# Patient Record
Sex: FEMALE | Race: WHITE | Employment: OTHER | URBAN - METROPOLITAN AREA
[De-identification: names, ages, dates, MRNs, and addresses within clinical notes are randomized per-mention and may not be internally consistent; named-entity substitution may affect disease eponyms.]

---

## 2016-05-10 LAB
HPV ABSTRACT: NORMAL
PAP-ABSTRACT: NORMAL

## 2017-08-02 ENCOUNTER — TRANSFERRED RECORDS (OUTPATIENT)
Dept: HEALTH INFORMATION MANAGEMENT | Facility: CLINIC | Age: 67
End: 2017-08-02

## 2017-08-02 LAB
CHOLEST SERPL-MCNC: 209 MG/DL (ref 0–199)
HDLC SERPL-MCNC: 56 MG/DL
HEP C HIM: NORMAL
LDLC SERPL CALC-MCNC: 141 MG/DL (ref 0–129)
NONHDLC SERPL-MCNC: 153 MG/DL
TRIGL SERPL-MCNC: 59 MG/DL (ref 0–149)

## 2017-12-29 ENCOUNTER — TRANSFERRED RECORDS (OUTPATIENT)
Dept: HEALTH INFORMATION MANAGEMENT | Facility: CLINIC | Age: 67
End: 2017-12-29

## 2018-03-08 ENCOUNTER — OFFICE VISIT (OUTPATIENT)
Dept: INTERNAL MEDICINE | Facility: CLINIC | Age: 68
End: 2018-03-08
Payer: COMMERCIAL

## 2018-03-08 ENCOUNTER — TELEPHONE (OUTPATIENT)
Dept: INTERNAL MEDICINE | Facility: CLINIC | Age: 68
End: 2018-03-08

## 2018-03-08 VITALS
WEIGHT: 157 LBS | BODY MASS INDEX: 23.25 KG/M2 | HEART RATE: 63 BPM | RESPIRATION RATE: 20 BRPM | TEMPERATURE: 98.6 F | OXYGEN SATURATION: 95 % | DIASTOLIC BLOOD PRESSURE: 77 MMHG | SYSTOLIC BLOOD PRESSURE: 137 MMHG | HEIGHT: 69 IN

## 2018-03-08 DIAGNOSIS — I10 HYPERTENSION GOAL BP (BLOOD PRESSURE) < 140/90: ICD-10-CM

## 2018-03-08 DIAGNOSIS — H91.90 HEARING LOSS, UNSPECIFIED HEARING LOSS TYPE, UNSPECIFIED LATERALITY: ICD-10-CM

## 2018-03-08 DIAGNOSIS — M85.80 OSTEOPENIA, UNSPECIFIED LOCATION: Primary | ICD-10-CM

## 2018-03-08 DIAGNOSIS — A60.00 RECURRENT GENITAL HERPES: ICD-10-CM

## 2018-03-08 LAB
ANION GAP SERPL CALCULATED.3IONS-SCNC: 9 MMOL/L (ref 3–14)
BUN SERPL-MCNC: 13 MG/DL (ref 7–30)
CALCIUM SERPL-MCNC: 9.5 MG/DL (ref 8.5–10.1)
CHLORIDE SERPL-SCNC: 98 MMOL/L (ref 94–109)
CO2 SERPL-SCNC: 30 MMOL/L (ref 20–32)
CREAT SERPL-MCNC: 0.6 MG/DL (ref 0.52–1.04)
GFR SERPL CREATININE-BSD FRML MDRD: >90 ML/MIN/1.7M2
GLUCOSE SERPL-MCNC: 78 MG/DL (ref 70–99)
POTASSIUM SERPL-SCNC: 3.3 MMOL/L (ref 3.4–5.3)
SODIUM SERPL-SCNC: 137 MMOL/L (ref 133–144)

## 2018-03-08 PROCEDURE — 82306 VITAMIN D 25 HYDROXY: CPT | Performed by: INTERNAL MEDICINE

## 2018-03-08 PROCEDURE — 36415 COLL VENOUS BLD VENIPUNCTURE: CPT | Performed by: INTERNAL MEDICINE

## 2018-03-08 PROCEDURE — 99214 OFFICE O/P EST MOD 30 MIN: CPT | Performed by: INTERNAL MEDICINE

## 2018-03-08 PROCEDURE — 80048 BASIC METABOLIC PNL TOTAL CA: CPT | Performed by: INTERNAL MEDICINE

## 2018-03-08 RX ORDER — TRIAMTERENE/HYDROCHLOROTHIAZID 37.5-25 MG
1 TABLET ORAL DAILY
Qty: 90 TABLET | Refills: 1 | Status: SHIPPED | OUTPATIENT
Start: 2018-03-08 | End: 2018-08-24

## 2018-03-08 RX ORDER — ACYCLOVIR 400 MG/1
400 TABLET ORAL 2 TIMES DAILY
Qty: 180 TABLET | Refills: 1 | Status: SHIPPED | OUTPATIENT
Start: 2018-03-08 | End: 2018-08-24

## 2018-03-08 RX ORDER — CHOLECALCIFEROL (VITAMIN D3) 25 MCG
1000 CAPSULE ORAL
COMMUNITY

## 2018-03-08 ASSESSMENT — PAIN SCALES - GENERAL: PAINLEVEL: NO PAIN (0)

## 2018-03-08 NOTE — MR AVS SNAPSHOT
After Visit Summary   3/8/2018    Florencia Ross    MRN: [id number]           Patient Information     Date Of Birth          1950        Visit Information        Provider Department      3/8/2018 10:50 AM Phuong Persaud MD [facility] [place]        Today's Diagnoses     Osteopenia, unspecified location    -  1    Hypertension goal BP (blood pressure) < 140/90        Recurrent genital herpes        Hearing loss, unspecified hearing loss type, unspecified laterality          Care Instructions    Please complete a hearing test at our clinic soon.     We will let you know your test results as discussed: by phone for urgent results, otherwise by postal mail.       Please return to clinic in 6 months for a physical, with fasting labs a few days before that (we may advise a visit sooner, based on today's lab/test results).     Please activate your [facility] [employer] account.     At your visit today, we discussed your risk for falls and preventive options.    Fall Prevention  Falls often occur due to slipping, tripping or losing your balance. Millions of people fall every year and injure themselves. Here are ways to reduce your risk of falling again.    Think about your fall, was there anything that caused your fall that can be fixed, removed, or replaced?    Make your home safe by keeping walkways clear of objects you may trip over.    Use non-slip pads under rugs. Do not use area rugs or small throw rugs.    Use non-slip mats in bathtubs and showers.    Install handrails and lights on staircases.    Do not walk in poorly lit areas.    Do not stand on chairs or wobbly ladders.    Use caution when reaching overhead or looking upward. This position can cause a loss of balance.    Be sure your shoes fit properly, have non-slip bottoms and are in good condition.     Wear shoes both inside and out. Avoid going barefoot or wearing slippers.    Be cautious when going up and down stairs, curbs,  and when walking on uneven sidewalks.    If your balance is poor, consider using a cane or walker.    If your fall was related to alcohol use, stop or limit alcohol intake.     If your fall was related to use of sleeping medicines, talk to your doctor about this. You may need to reduce your dosage at bedtime if you awaken during the night to go to the bathroom.      To reduce the need for nighttime bathroom trips:    Avoid drinking fluids for several hours before going to bed    Empty your bladder before going to bed    Men can keep a urinal at the bedside    Stay as active as you can. Balance, flexibility, strength, and endurance all come from exercise. They all play a role in preventing falls. Ask your healthcare provider which types of activity are right for you.    Get your vision checked on a regular basis.    If you have pets, know where they are before you stand up or walk so you don't trip over them.    Use night lights.  Date Last Reviewed: 11/5/2015 2000-2017 [employer]. [address] [place] [address]. All rights reserved. This information is not intended as a substitute for professional medical care. Always follow your healthcare professional's instructions.                Follow-ups after your visit        Additional Services     AUDIOLOGY ADULT REFERRAL       Your provider has referred you to: FM[facility]: [facility] [phone]   [email or web address]  [facility]: [facility] [phone]   [email or web address]    Treatment:  Evaluation & Treatment  Specialty Testing:  Audiogram w/Tymps and Reflexes    Please be aware that coverage of these services is subject to the terms and limitations of your health insurance plan.  Call member services at your health plan with any benefit or coverage questions.      Please bring the following to your appointment:  >>   Any x-rays, CTs or MRIs which have been  "performed.  Contact the facility where they were done to arrange for  prior to your scheduled appointment.   >>   List of current medications  >>   This referral request   >>   Any documents/labs given to you for this referral                  Future tests that were ordered for you today     Open Future Orders        Priority Expected Expires Ordered    **Basic metabolic panel FUTURE 6mo Routine 2018 10/4/2018 3/8/2018    Lipid panel reflex to direct LDL Fasting Routine 2018 10/4/2018 3/8/2018            Who to contact     If you have questions or need follow up information about today's clinic visit or your schedule please contact [facility] directly at [phone].  Normal or non-critical lab and imaging results will be communicated to you by MyChart, letter or phone within 4 business days after the clinic has received the results. If you do not hear from us within 7 days, please contact the clinic through [employer] or phone. If you have a critical or abnormal lab result, we will notify you by phone as soon as possible.  Submit refill requests through [employer] or call your pharmacy and they will forward the refill request to us. Please allow 3 business days for your refill to be completed.          Additional Information About Your Visit        MyChart Information     [employer] lets you send messages to your doctor, view your test results, renew your prescriptions, schedule appointments and more. To sign up, go to [email or web address] . Click on \"Log in\" on the left side of the screen, which will take you to the Welcome page. Then click on \"Sign up Now\" on the right side of the page.     You will be asked to enter the access code listed below, as well as some personal information. Please follow the directions to create your username and password.     Your access code is: [email or web address]  Expires: 2018 11:24 AM     Your access code will  in 90 days. If you need help or a new code, " "please call your [place] clinic or [phone].        Care EveryWhere ID     This is your Care EveryWhere ID. This could be used by other organizations to access your [place] medical records  [id number]        Your Vitals Were     Pulse Temperature Respirations Height Pulse Oximetry BMI (Body Mass Index)    63 98.6  F (37  C) (Oral) 20 5' 8.86\" (1.749 m) 95% 23.28 kg/m2       Blood Pressure from Last 3 Encounters:   03/08/18 137/77   07/23/15 122/71   05/14/12 138/72    Weight from Last 3 Encounters:   03/08/18 157 lb (71.2 kg)   05/14/12 151 lb (68.5 kg)   07/11/11 153 lb (69.4 kg)              We Performed the Following     AUDIOLOGY ADULT REFERRAL     Basic metabolic panel     Vitamin D Deficiency          Today's Medication Changes          These changes are accurate as of 3/8/18 11:27 AM.  If you have any questions, ask your nurse or doctor.               Stop taking these medicines if you haven't already. Please contact your care team if you have questions.     tretinoin 0.025 % cream   Commonly known as:  RETIN-A   Stopped by:  Phuong Persaud MD                Where to get your medicines      These medications were sent to [facility] [address] [place], [place] - [address] [facility]  [address] [facility] [address]     Phone:  [phone]     acyclovir 400 MG tablet    triamterene-hydrochlorothiazide 37.5-25 MG per tablet                Primary Care Provider Office Phone # Fax #    González Russo [phone]       [address]  [facility] [address]        Equal Access to Services     [facility]: Hadii shimon Carvajal, wavaishnavida lurocaeladaha, qaybta kaalmada pastora, kendal curtis. So [facility] [phone].    ATENCIÓN: Si habla español, tiene a benjamin disposición servicios gratuitos de asistencia lingüística. Llame al [phone].    We comply with applicable federal civil rights laws and [place] laws. We do not discriminate on " the basis of race, color, national origin, age, disability, sex, sexual orientation, or gender identity.            Thank you!     [facility] you for [facility]  [facility]. Our goal is always to provide you with excellent care. Hearing back from our patients is one way we can continue to improve our services. Please take a few minutes to complete the written survey that you may receive in the mail after your visit with us. Thank you!             Your Updated Medication List - Protect others around you: Learn how to safely use, store and throw away your medicines at www.disposemymeds.org.          This list is accurate as of 3/8/18 11:27 AM.  Always use your most recent med list.                   Brand Name Dispense Instructions for use Diagnosis    acyclovir 400 MG tablet    ZOVIRAX    180 tablet    Take 1 tablet (400 mg) by mouth 2 times daily    Recurrent genital herpes       aspirin 81 MG EC tablet     90 tablet    Take 1 tablet by mouth daily.    Hypertension goal BP (blood pressure) < 140/90       ONE-A-DAY WOMENS PO      Take  by mouth.        triamterene-hydrochlorothiazide 37.5-25 MG per tablet    MAXZIDE-25    90 tablet    Take 1 tablet by mouth daily    Hypertension goal BP (blood pressure) < 140/90       TUMS PO      Take  by mouth. 2  A day        Vitamin D-3 1000 UNITS Caps      1,000 Units

## 2018-03-08 NOTE — PROGRESS NOTES
SUBJECTIVE:   Florencia Ross is a 67 year old female who presents to clinic today for the following health issues:      New Patient/Transfer of Care      Hearing loss in the right ear (pt is unsure if it may also be the left ear), since she had pneumonia 30 years ago, but the hearing loss has been getting worse in the past 3-4 years. She does also report tinnitus-she has had the tinnitus for years, but not sure of the exact duration. No vertigo. No headaches. No vision changes.            Reviewed and updated as needed this visit by clinical staff  Tobacco  Allergies  Meds  Problems       Reviewed and updated as needed this visit by Provider  Meds  Problems           Patient Active Problem List   Diagnosis     Hypertension goal BP (blood pressure) < 140/90     CARDIOVASCULAR SCREENING; LDL GOAL LESS THAN 130     Recurrent genital herpes     Osteopenia     Wrinkling of orbital skin     Lentigo     KP (keratosis pilaris)     Angioma of skin     Photoaging of skin     Dysplastic nevus of right lower back s/p excision 7-23-15       Past Medical History:   Diagnosis Date     Hypertension        Past Surgical History:   Procedure Laterality Date     GYN SURGERY  1983    c section       Family History   Problem Relation Age of Onset     CEREBROVASCULAR DISEASE Mother      Hypertension Father        Social History   Substance Use Topics     Smoking status: Former Smoker     Quit date: 7/11/1977     Smokeless tobacco: Never Used     Alcohol use Yes       Current Outpatient Prescriptions   Medication     triamterene-hydrochlorothiazide (MAXZIDE-25) 37.5-25 MG per tablet     acyclovir (ZOVIRAX) 400 MG tablet     Multiple Vitamins-Calcium (ONE-A-DAY WOMENS PO)     Cholecalciferol (VITAMIN D-3) 1000 UNITS CAPS     aspirin 81 MG EC tablet     Calcium Carbonate Antacid (TUMS PO)     No current facility-administered medications for this visit.          ROS:  Constitutional, HEENT, cardiovascular, pulmonary, GI, ,  "musculoskeletal, neuro, skin, endocrine and psych systems are negative, except as otherwise noted.     OBJECTIVE:                                                    /77  Pulse 63  Temp 98.6  F (37  C) (Oral)  Resp 20  Ht 5' 8.86\" (1.749 m)  Wt 157 lb (71.2 kg)  SpO2 95%  BMI 23.28 kg/m2     GENERAL APPEARANCE: healthy, alert and in no distress  EYES: Eyes grossly normal to inspection, and conjunctivae and sclerae normal  HENT: head normocephalic and atraumatic and mouth without ulcers or lesions, oropharynx clear and oral mucous membranes moist  NECK: no noticeable adenopathy, no asymmetry, masses, or scars   RESP: lungs clear to auscultation - no rales, rhonchi or wheezes  CV: regular rate and rhythm, normal S1 S2, no S3 or S4, no murmur, click or rub, no peripheral edema and peripheral pulses strong  ABDOMEN: soft, nontender, no hepatosplenomegaly, no masses and bowel sounds normal  MS: no musculoskeletal defects are noted and gait is age appropriate without ataxia  SKIN: no suspicious lesions or rashes  NEURO: mentation intact and speech normal  PSYCH: mentation appears normal and affect normal/bright.    Results for orders placed or performed in visit on 03/08/18   Basic metabolic panel   Result Value Ref Range    Sodium 137 133 - 144 mmol/L    Potassium 3.3 (L) 3.4 - 5.3 mmol/L    Chloride 98 94 - 109 mmol/L    Carbon Dioxide 30 20 - 32 mmol/L    Anion Gap 9 3 - 14 mmol/L    Glucose 78 70 - 99 mg/dL    Urea Nitrogen 13 7 - 30 mg/dL    Creatinine 0.60 0.52 - 1.04 mg/dL    GFR Estimate >90 >60 mL/min/1.7m2    GFR Estimate If Black >90 >60 mL/min/1.7m2    Calcium 9.5 8.5 - 10.1 mg/dL   Vitamin D Deficiency   Result Value Ref Range    Vitamin D Deficiency screening 74 20 - 75 ug/L       No results found for this or any previous visit (from the past 744 hour(s)).      ASSESSMENT/PLAN:                                                        ICD-10-CM    1. Osteopenia, unspecified location M85.80 Vitamin D " Deficiency   2. Hypertension goal BP (blood pressure) < 140/90 I10 triamterene-hydrochlorothiazide (MAXZIDE-25) 37.5-25 MG per tablet     **Basic metabolic panel FUTURE 6mo     Lipid panel reflex to direct LDL Fasting     Basic metabolic panel   3. Recurrent genital herpes A60.00 acyclovir (ZOVIRAX) 400 MG tablet   4. Hearing loss, unspecified hearing loss type, unspecified laterality H91.90 AUDIOLOGY ADULT REFERRAL       Patient Instructions   Please complete a hearing test at our clinic soon.     We will let you know your test results as discussed: by phone for urgent results, otherwise by postal mail.       Please return to clinic in 6 months for a physical, with fasting labs a few days before that (we may advise a visit sooner, based on today's lab/test results).     Please activate your [facility] account.     At your visit today, we discussed your risk for falls and preventive options.    Fall Prevention  Falls often occur due to slipping, tripping or losing your balance. Millions of people fall every year and injure themselves. Here are ways to reduce your risk of falling again.    Think about your fall, was there anything that caused your fall that can be fixed, removed, or replaced?    Make your home safe by keeping walkways clear of objects you may trip over.    Use non-slip pads under rugs. Do not use area rugs or small throw rugs.    Use non-slip mats in bathtubs and showers.    Install handrails and lights on staircases.    Do not walk in poorly lit areas.    Do not stand on chairs or wobbly ladders.    Use caution when reaching overhead or looking upward. This position can cause a loss of balance.    Be sure your shoes fit properly, have non-slip bottoms and are in good condition.     Wear shoes both inside and out. Avoid going barefoot or wearing slippers.    Be cautious when going up and down stairs, curbs, and when walking on uneven sidewalks.    If your balance is poor, consider using a cane  or walker.    If your fall was related to alcohol use, stop or limit alcohol intake.     If your fall was related to use of sleeping medicines, talk to your doctor about this. You may need to reduce your dosage at bedtime if you awaken during the night to go to the bathroom.      To reduce the need for nighttime bathroom trips:    Avoid drinking fluids for several hours before going to bed    Empty your bladder before going to bed    Men can keep a urinal at the bedside    Stay as active as you can. Balance, flexibility, strength, and endurance all come from exercise. They all play a role in preventing falls. Ask your healthcare provider which types of activity are right for you.    Get your vision checked on a regular basis.    If you have pets, know where they are before you stand up or walk so you don't trip over them.    Use night lights.  Date Last Reviewed: 11/5/2015 2000-2017 [employer]. [address] [place] [address]. All rights reserved. This information is not intended as a substitute for professional medical care. Always follow your healthcare professional's instructions.            Phuong Persaud MD    [facility]  [address] [facility] [address] [phone]

## 2018-03-08 NOTE — PATIENT INSTRUCTIONS
Please complete a hearing test at our clinic soon.     We will let you know your test results as discussed: by phone for urgent results, otherwise by postal mail.       Please return to clinic in 6 months for a physical, with fasting labs a few days before that (we may advise a visit sooner, based on today's lab/test results).     Please activate your [employer] account.     At your visit today, we discussed your risk for falls and preventive options.    Fall Prevention  Falls often occur due to slipping, tripping or losing your balance. Millions of people fall every year and injure themselves. Here are ways to reduce your risk of falling again.    Think about your fall, was there anything that caused your fall that can be fixed, removed, or replaced?    Make your home safe by keeping walkways clear of objects you may trip over.    Use non-slip pads under rugs. Do not use area rugs or small throw rugs.    Use non-slip mats in bathtubs and showers.    Install handrails and lights on staircases.    Do not walk in poorly lit areas.    Do not stand on chairs or wobbly ladders.    Use caution when reaching overhead or looking upward. This position can cause a loss of balance.    Be sure your shoes fit properly, have non-slip bottoms and are in good condition.     Wear shoes both inside and out. Avoid going barefoot or wearing slippers.    Be cautious when going up and down stairs, curbs, and when walking on uneven sidewalks.    If your balance is poor, consider using a cane or walker.    If your fall was related to alcohol use, stop or limit alcohol intake.     If your fall was related to use of sleeping medicines, talk to your doctor about this. You may need to reduce your dosage at bedtime if you awaken during the night to go to the bathroom.      To reduce the need for nighttime bathroom trips:    Avoid drinking fluids for several hours before going to bed    Empty your bladder before going to bed    Men can  keep a urinal at the bedside    Stay as active as you can. Balance, flexibility, strength, and endurance all come from exercise. They all play a role in preventing falls. Ask your healthcare provider which types of activity are right for you.    Get your vision checked on a regular basis.    If you have pets, know where they are before you stand up or walk so you don't trip over them.    Use night lights.  Date Last Reviewed: 11/5/2015 2000-2017 [employer]. [address] [place] [address]. All rights reserved. This information is not intended as a substitute for professional medical care. Always follow your healthcare professional's instructions.

## 2018-03-09 LAB — DEPRECATED CALCIDIOL+CALCIFEROL SERPL-MC: 74 UG/L (ref 20–75)

## 2018-03-11 ENCOUNTER — OFFICE VISIT (OUTPATIENT)
Dept: URGENT CARE | Facility: URGENT CARE | Age: 68
End: 2018-03-11
Payer: COMMERCIAL

## 2018-03-11 ENCOUNTER — TELEPHONE (OUTPATIENT)
Dept: INTERNAL MEDICINE | Facility: CLINIC | Age: 68
End: 2018-03-11

## 2018-03-11 VITALS
WEIGHT: 156.38 LBS | BODY MASS INDEX: 23.16 KG/M2 | HEART RATE: 71 BPM | OXYGEN SATURATION: 99 % | TEMPERATURE: 97.8 F | DIASTOLIC BLOOD PRESSURE: 90 MMHG | HEIGHT: 69 IN | SYSTOLIC BLOOD PRESSURE: 195 MMHG

## 2018-03-11 DIAGNOSIS — E87.6 HYPOKALEMIA: Primary | ICD-10-CM

## 2018-03-11 DIAGNOSIS — I10 HYPERTENSION, UNSPECIFIED TYPE: Primary | ICD-10-CM

## 2018-03-11 PROCEDURE — 99213 OFFICE O/P EST LOW 20 MIN: CPT | Performed by: FAMILY MEDICINE

## 2018-03-11 NOTE — PATIENT INSTRUCTIONS
Established High Blood Pressure    High blood pressure (hypertension) is a chronic disease. Often, healthcare providers don t know what causes it. But it can be caused by certain health conditions and medicines.  If you have high blood pressure, you may not have any symptoms. If you do have symptoms, they may include headache, dizziness, changes in your vision, chest pain, and shortness of breath. But even without symptoms, high blood pressure that s not treated raises your risk for heart attack and stroke. High blood pressure is a serious health risk and shouldn t be ignored.  A blood pressure reading is made up of two numbers: a higher number over a lower number. The top number is the systolic pressure. The bottom number is the diastolic pressure. A normal blood pressure is a systolic pressure of  less than 120 over a diastolic pressure of less than 80. You will see your blood pressure readings written together. For example, a person with a systolic pressure of 188 and a diastolic pressure of 78 will have 118/78 written in the medical record.  High blood pressure is when either the top number is 140 or higher, or the bottom number is 90 or higher. This must be the result when taking your blood pressure a number of times. The blood pressures between normal and high are called prehypertension.  Home care  If you have high blood pressure, you should do what is listed below to lower your blood pressure. If you are taking medicines for high blood pressure, these methods may reduce or end your need for medicines in the future.    Begin a weight-loss program if you are overweight.    Cut back on how much salt you get in your diet. Here s how to do this:    Don t eat foods that have a lot of salt. These include olives, pickles, smoked meats, and salted potato chips.    Don t add salt to your food at the table.    Use only small amounts of salt when cooking.    Start an exercise program. Talk with your healthcare  provider about the type of exercise program that would be best for you. It doesn't have to be hard. Even brisk walking for 20 minutes 3 times a week is a good form of exercise.    Don t take medicines that stimulate the heart. This includes many over-the-counter cold and sinus decongestant pills and sprays, as well as diet pills. Check the warnings about hypertension on the label. Before buying any over-the-counter medicines or supplements, always ask the pharmacist about the product's potential interaction with your high blood pressure and your high blood pressure medicines.    Stimulants such as amphetamine or cocaine could be deadly for someone with high blood pressure. Never take these.    Limit how much caffeine you get in your diet. Switch to caffeine-free products.    Stop smoking. If you are a long-time smoker, this can be hard. Talk to your healthcare provider about medicines and nicotine replacement options to help you. Also, enroll in a stop-smoking program to make it more likely that you will quit for good.    Learn how to handle stress. This is an important part of any program to lower blood pressure. Learn about relaxation methods like meditation, yoga, or biofeedback.    If your provider prescribed medicines, take them exactly as directed. Missing doses may cause your blood pressure get out of control.    If you miss a dose or doses, check with your healthcare provider or pharmacist about what to do.    Consider buying an automatic blood pressure machine. Ask your provider for a recommendation. You can get one of these at most pharmacies.     The American Heart Association recommends the following guidelines for home blood pressure monitoring:    Don't smoke or drink coffee for 30 minutes before taking your blood pressure.    Go to the bathroom before the test.    Relax for 5 minutes before taking the measurement.    Sit with your back supported (don't sit on a couch or soft chair); keep your feet on  the floor uncrossed. Place your arm on a solid flat surface (like a table) with the upper part of the arm at heart level. Place the middle of the cuff directly above the eye of the elbow. Check the monitor's instruction manual for an illustration.    Take multiple readings. When you measure, take 2 to 3 readings one minute apart and record all of the results.    Take your blood pressure at the same time every day, or as your healthcare provider recommends.    Record the date, time, and blood pressure reading.    Take the record with you to your next medical appointment. If your blood pressure monitor has a built-in memory, simply take the monitor with you to your next appointment.    Call your provider if you have several high readings. Don't be frightened by a single high blood pressure reading, but if you get several high readings, check in with your healthcare provider.    Note: When blood pressure reaches a systolic (top number) of 180 or higher OR diastolic (bottom number) of 110 or higher, seek emergency medical treatment.  Follow-up care  You will need to see your healthcare provider regularly. This is to check your blood pressure and to make changes to your medicines. Make a follow-up appointment as directed. Bring the record of your home blood pressure readings to the appointment.  When to seek medical advice  Call your healthcare provider right away if any of these occur:    Blood pressure reaches a systolic (upper number) of 180 or higher OR a diastolic (bottom number) of 110 or higher    Chest pain or shortness of breath    Severe headache    Throbbing or rushing sound in the ears    Nosebleed    Sudden severe pain in your belly (abdomen)    Extreme drowsiness, confusion, or fainting    Dizziness or spinning sensation (vertigo)    Weakness of an arm or leg or one side of the face    You have problems speaking or seeing   Date Last Reviewed: 12/1/2016 2000-2017 [employer]. [address] [place] [address]  [place] [address]. All rights reserved. This information is not intended as a substitute for professional medical care. Always follow your healthcare professional's instructions.

## 2018-03-11 NOTE — MR AVS SNAPSHOT
After Visit Summary   3/11/2018    Florencia Ross    MRN: [id number]           Patient Information     Date Of Birth          1950        Visit Information        Provider Department      3/11/2018 1:35 PM Emil Traylor MD [facility]        Today's Diagnoses     Hypertension, unspecified type    -  1      Care Instructions      Established High Blood Pressure    High blood pressure (hypertension) is a chronic disease. Often, healthcare providers don t know what causes it. But it can be caused by certain health conditions and medicines.  If you have high blood pressure, you may not have any symptoms. If you do have symptoms, they may include headache, dizziness, changes in your vision, chest pain, and shortness of breath. But even without symptoms, high blood pressure that s not treated raises your risk for heart attack and stroke. High blood pressure is a serious health risk and shouldn t be ignored.  A blood pressure reading is made up of two numbers: a higher number over a lower number. The top number is the systolic pressure. The bottom number is the diastolic pressure. A normal blood pressure is a systolic pressure of  less than 120 over a diastolic pressure of less than 80. You will see your blood pressure readings written together. For example, a person with a systolic pressure of 188 and a diastolic pressure of 78 will have 118/78 written in the medical record.  High blood pressure is when either the top number is 140 or higher, or the bottom number is 90 or higher. This must be the result when taking your blood pressure a number of times. The blood pressures between normal and high are called prehypertension.  Home care  If you have high blood pressure, you should do what is listed below to lower your blood pressure. If you are taking medicines for high blood pressure, these methods may reduce or end your need for medicines in the future.    Begin a weight-loss program if you  are overweight.    Cut back on how much salt you get in your diet. Here s how to do this:    Don t eat foods that have a lot of salt. These include olives, pickles, smoked meats, and salted potato chips.    Don t add salt to your food at the table.    Use only small amounts of salt when cooking.    Start an exercise program. Talk with your healthcare provider about the type of exercise program that would be best for you. It doesn't have to be hard. Even brisk walking for 20 minutes 3 times a week is a good form of exercise.    Don t take medicines that stimulate the heart. This includes many over-the-counter cold and sinus decongestant pills and sprays, as well as diet pills. Check the warnings about hypertension on the label. Before buying any over-the-counter medicines or supplements, always ask the pharmacist about the product's potential interaction with your high blood pressure and your high blood pressure medicines.    Stimulants such as amphetamine or cocaine could be deadly for someone with high blood pressure. Never take these.    Limit how much caffeine you get in your diet. Switch to caffeine-free products.    Stop smoking. If you are a long-time smoker, this can be hard. Talk to your healthcare provider about medicines and nicotine replacement options to help you. Also, enroll in a stop-smoking program to make it more likely that you will quit for good.    Learn how to handle stress. This is an important part of any program to lower blood pressure. Learn about relaxation methods like meditation, yoga, or biofeedback.    If your provider prescribed medicines, take them exactly as directed. Missing doses may cause your blood pressure get out of control.    If you miss a dose or doses, check with your healthcare provider or pharmacist about what to do.    Consider buying an automatic blood pressure machine. Ask your provider for a recommendation. You can get one of these at most pharmacies.     The American  Heart Association recommends the following guidelines for home blood pressure monitoring:    Don't smoke or drink coffee for 30 minutes before taking your blood pressure.    Go to the bathroom before the test.    Relax for 5 minutes before taking the measurement.    Sit with your back supported (don't sit on a couch or soft chair); keep your feet on the floor uncrossed. Place your arm on a solid flat surface (like a table) with the upper part of the arm at heart level. Place the middle of the cuff directly above the eye of the elbow. Check the monitor's instruction manual for an illustration.    Take multiple readings. When you measure, take 2 to 3 readings one minute apart and record all of the results.    Take your blood pressure at the same time every day, or as your healthcare provider recommends.    Record the date, time, and blood pressure reading.    Take the record with you to your next medical appointment. If your blood pressure monitor has a built-in memory, simply take the monitor with you to your next appointment.    Call your provider if you have several high readings. Don't be frightened by a single high blood pressure reading, but if you get several high readings, check in with your healthcare provider.    Note: When blood pressure reaches a systolic (top number) of 180 or higher OR diastolic (bottom number) of 110 or higher, seek emergency medical treatment.  Follow-up care  You will need to see your healthcare provider regularly. This is to check your blood pressure and to make changes to your medicines. Make a follow-up appointment as directed. Bring the record of your home blood pressure readings to the appointment.  When to seek medical advice  Call your healthcare provider right away if any of these occur:    Blood pressure reaches a systolic (upper number) of 180 or higher OR a diastolic (bottom number) of 110 or higher    Chest pain or shortness of breath    Severe headache    Throbbing or  "rushing sound in the ears    Nosebleed    Sudden severe pain in your belly (abdomen)    Extreme drowsiness, confusion, or fainting    Dizziness or spinning sensation (vertigo)    Weakness of an arm or leg or one side of the face    You have problems speaking or seeing   Date Last Reviewed: 12/1/2016 2000-2017 [employer]. [address] [place] [address]. All rights reserved. This information is not intended as a substitute for professional medical care. Always follow your healthcare professional's instructions.                Follow-ups after your visit        Future tests that were ordered for you today     Open Future Orders        Priority Expected Expires Ordered    **Basic metabolic panel FUTURE 14d Routine 3/18/2018 3/25/2018 3/11/2018            Who to contact     If you have questions or need follow up information about today's clinic visit or your schedule please contact [facility] directly at [phone].  Normal or non-critical lab and imaging results will be communicated to you by [email or web address], letter or phone within 4 business days after the clinic has received the results. If you do not hear from us within 7 days, please contact the clinic through [email or web address] or phone. If you have a critical or abnormal lab result, we will notify you by phone as soon as possible.  Submit refill requests through [employer] or call your pharmacy and they will forward the refill request to us. Please allow 3 business days for your refill to be completed.          Additional Information About Your Visit        [employer] Information     [employer] lets you send messages to your doctor, view your test results, renew your prescriptions, schedule appointments and more. To sign up, go to [email or web address] . Click on \"Log in\" on the left side of the screen, which will take you to the Welcome page. Then click on \"Sign up Now\" on the right side of the page.     You will be asked to enter the access " "code listed below, as well as some personal information. Please follow the directions to create your username and password.     Your access code is: [email or web address]  Expires: 2018 12:24 PM     Your access code will  in 90 days. If you need help or a new code, please call your [facility] clinic or [phone].        Care EveryWhere ID     This is your Care EveryWhere ID. This could be used by other organizations to access your [facility] medical records  [id number]        Your Vitals Were     Pulse Temperature Height Pulse Oximetry BMI (Body Mass Index)       71 97.8  F (36.6  C) (Oral) 5' 8.86\" (1.749 m) 99% 23.19 kg/m2        Blood Pressure from Last 3 Encounters:   18 195/90   18 137/77   07/23/15 122/71    Weight from Last 3 Encounters:   18 156 lb 6 oz (70.9 kg)   18 157 lb (71.2 kg)   12 151 lb (68.5 kg)              Today, you had the following     No orders found for display       Primary Care Provider Office Phone # Fax #    González Russo [phone]       [address]  [facility] [address]        Equal Access to Services     [facility]: Hadii aad ku hadasho Soomaali, waaxda luqadaha, qaybta kaalmada adeegyada, kendal evans . So [facility] [phone].    ATENCIÓN: Si habla español, tiene a benjamin disposición servicios gratuitos de asistencia lingüística. Llame al [phone].    We comply with applicable federal civil rights laws and [place] laws. We do not discriminate on the basis of race, color, national origin, age, disability, sex, sexual orientation, or gender identity.            Thank you!     Thank you for choosing [facility]  [facility]. Our goal is always to provide you with excellent care. Hearing back from our patients is one way we can continue to improve our services. Please take a few minutes to complete the written survey that you may receive in the mail after your visit with us. Thank " you!             Your Updated Medication List - Protect others around you: Learn how to safely use, store and throw away your medicines at www.disposemymeds.org.          This list is accurate as of 3/11/18  2:34 PM.  Always use your most recent med list.                   Brand Name Dispense Instructions for use Diagnosis    acyclovir 400 MG tablet    ZOVIRAX    180 tablet    Take 1 tablet (400 mg) by mouth 2 times daily    Recurrent genital herpes       aspirin 81 MG EC tablet     90 tablet    Take 1 tablet by mouth daily.    Hypertension goal BP (blood pressure) < 140/90       ONE-A-DAY WOMENS PO      Take  by mouth.        triamterene-hydrochlorothiazide 37.5-25 MG per tablet    MAXZIDE-25    90 tablet    Take 1 tablet by mouth daily    Hypertension goal BP (blood pressure) < 140/90       TUMS PO      Take  by mouth. 2  A day        Vitamin D-3 1000 UNITS Caps      1,000 Units

## 2018-03-11 NOTE — TELEPHONE ENCOUNTER
Please call and advise the patient as follows, and also send a letter with the same text and attach recent test results [statements which are in bold and in square brackets, like this sentence, is for clinic staff and should not be included in the text of the letter to the patient]:      Dear Florencia,    Your vitamin D level is within normal limits. Please continue your current vitamin D regimen.  Your blood potassium level is slightly low. Low potassium can cause muscle cramps, fatigue, and even abnormal heart rhythms.  Please try to eat more foods which have a lot of potassium in them, like potatoes (baked), spinach (cooked), lentils (cooked), kidney beans (cooked), bananas, raisins, and orange juice.  Please make a nonfasting lab appointment to recheck your potassium level in 2 weeks. If your potassium is still low, (given your use of Maxzide) we may need to presribe oral potassium supplements.    Please call our clinic at [phone] if you have any questions.    Phuong Persaud MD

## 2018-03-12 NOTE — TELEPHONE ENCOUNTER
Patient informed of result note as below and to recheck lab in 2 weeks  Patient verbalized understanding  .Judy MELON, RN, CPN

## 2018-05-08 DIAGNOSIS — I10 HYPERTENSION GOAL BP (BLOOD PRESSURE) < 140/90: ICD-10-CM

## 2018-05-08 LAB
ANION GAP SERPL CALCULATED.3IONS-SCNC: 7 MMOL/L (ref 3–14)
BUN SERPL-MCNC: 11 MG/DL (ref 7–30)
CALCIUM SERPL-MCNC: 9.2 MG/DL (ref 8.5–10.1)
CHLORIDE SERPL-SCNC: 103 MMOL/L (ref 94–109)
CHOLEST SERPL-MCNC: 205 MG/DL
CO2 SERPL-SCNC: 31 MMOL/L (ref 20–32)
CREAT SERPL-MCNC: 0.68 MG/DL (ref 0.52–1.04)
GFR SERPL CREATININE-BSD FRML MDRD: 87 ML/MIN/1.7M2
GLUCOSE SERPL-MCNC: 85 MG/DL (ref 70–99)
HDLC SERPL-MCNC: 63 MG/DL
LDLC SERPL CALC-MCNC: 128 MG/DL
NONHDLC SERPL-MCNC: 142 MG/DL
POTASSIUM SERPL-SCNC: 3.8 MMOL/L (ref 3.4–5.3)
SODIUM SERPL-SCNC: 141 MMOL/L (ref 133–144)
TRIGL SERPL-MCNC: 69 MG/DL

## 2018-05-08 PROCEDURE — 80061 LIPID PANEL: CPT | Performed by: INTERNAL MEDICINE

## 2018-05-08 PROCEDURE — 36415 COLL VENOUS BLD VENIPUNCTURE: CPT | Performed by: INTERNAL MEDICINE

## 2018-05-08 PROCEDURE — 80048 BASIC METABOLIC PNL TOTAL CA: CPT | Performed by: INTERNAL MEDICINE

## 2018-05-09 NOTE — PROGRESS NOTES
Plan to discuss at upcoming visit.     Bmp  Nl.  May consider statin at the upcoming visit:  The 10-year ASCVD risk score (Raleigh DC Jr, et al., 2013) is: 11.7%    Values used to calculate the score:      Age: 67 years      Sex: Female      Is Non- : No      Diabetic: No      Tobacco smoker: No      Systolic Blood Pressure: 195 mmHg      Is BP treated: No      HDL Cholesterol: 63 mg/dL      Total Cholesterol: 205 mg/dL      . Dr Phuong Persaud MD.

## 2018-05-17 ENCOUNTER — OFFICE VISIT (OUTPATIENT)
Dept: INTERNAL MEDICINE | Facility: CLINIC | Age: 68
End: 2018-05-17
Payer: COMMERCIAL

## 2018-05-17 VITALS
BODY MASS INDEX: 23.13 KG/M2 | DIASTOLIC BLOOD PRESSURE: 77 MMHG | TEMPERATURE: 98.9 F | SYSTOLIC BLOOD PRESSURE: 132 MMHG | RESPIRATION RATE: 18 BRPM | OXYGEN SATURATION: 98 % | WEIGHT: 156 LBS | HEART RATE: 63 BPM

## 2018-05-17 DIAGNOSIS — I10 HYPERTENSION GOAL BP (BLOOD PRESSURE) < 140/90: Primary | ICD-10-CM

## 2018-05-17 DIAGNOSIS — Z13.6 CARDIOVASCULAR SCREENING; LDL GOAL LESS THAN 130: ICD-10-CM

## 2018-05-17 DIAGNOSIS — Z71.89 ADVANCED DIRECTIVES, COUNSELING/DISCUSSION: ICD-10-CM

## 2018-05-17 PROCEDURE — 99214 OFFICE O/P EST MOD 30 MIN: CPT | Performed by: INTERNAL MEDICINE

## 2018-05-17 NOTE — PROGRESS NOTES
SUBJECTIVE:                                                    Florecnia Ross is a 67 year old female who presents to clinic today for the following health issues:    Hyperlipidemia Follow-Up      Rate your low fat/cholesterol diet?: not monitoring fat    Taking statin?  No    Other lipid medications/supplements?:  None    The 10-year ASCVD risk score (Horaceying SIFUENTES Jr, et al., 2013) is: 5.7%    Values used to calculate the score:      Age: 67 years      Sex: Female      Is Non- : No      Diabetic: No      Tobacco smoker: No      Systolic Blood Pressure: 132 mmHg      Is BP treated: No      HDL Cholesterol: 63 mg/dL      Total Cholesterol: 205 mg/dL          Please abstract:  HIV 1/2 Ag/Ab 4th Generation 8/2/2017  [employer]  Component Name Value Ref Range   HIV 1/2 AG/AB 4thGEN Negative (Non Reactive)    Comment: HIV-1 p24 Ag and HIV-1/HIV-2 Ab not detected.  NEGNR    Result Narrative   Performed at [facility], [address] [place] [address]         Hypertension Follow-up      Outpatient blood pressures are being checked at home.  Results are 130/70.    Low Salt Diet: no added salt      Amount of exercise or physical activity: 4-5 days/week for an average of 45-60 minutes    Problems taking medications regularly: No    Medication side effects: none    Diet: regular (no restrictions)      Medications and problem list reviewed by MA and Provider.      Patient Active Problem List   Diagnosis     Hypertension goal BP (blood pressure) < 140/90     CARDIOVASCULAR SCREENING; LDL GOAL LESS THAN 130     Recurrent genital herpes     Osteopenia     Wrinkling of orbital skin     Lentigo     KP (keratosis pilaris)     Angioma of skin     Photoaging of skin     Dysplastic nevus of right lower back s/p excision 7-23-15     Advanced directives, counseling/discussion       Past Medical History:   Diagnosis Date     Hypertension        Past Surgical History:   Procedure  Laterality Date     GYN SURGERY  1983    c section       Family History   Problem Relation Age of Onset     CEREBROVASCULAR DISEASE Mother      Hypertension Father        Social History   Substance Use Topics     Smoking status: Former Smoker     Quit date: 7/11/1977     Smokeless tobacco: Never Used     Alcohol use Yes       Current Outpatient Prescriptions   Medication     acyclovir (ZOVIRAX) 400 MG tablet     aspirin 81 MG EC tablet     Calcium Carbonate Antacid (TUMS PO)     Cholecalciferol (VITAMIN D-3) 1000 UNITS CAPS     Multiple Vitamins-Calcium (ONE-A-DAY WOMENS PO)     triamterene-hydrochlorothiazide (MAXZIDE-25) 37.5-25 MG per tablet     No current facility-administered medications for this visit.          ROS:  Constitutional, HEENT, cardiovascular, pulmonary, GI, , musculoskeletal, neuro, skin, endocrine and psych systems are negative, except as otherwise noted.     OBJECTIVE:                                                    /77  Pulse 63  Temp 98.9  F (37.2  C) (Oral)  Resp 18  Wt 156 lb (70.8 kg)  SpO2 98%  Breastfeeding? No  BMI 23.13 kg/m2     GENERAL APPEARANCE: healthy, alert and in no distress  EYES: Eyes grossly normal to inspection, and conjunctivae and sclerae normal  HENT: head normocephalic and atraumatic and mouth without ulcers or lesions, oropharynx clear and oral mucous membranes moist  NECK: no noticeable adenopathy, no asymmetry, masses, or scars   RESP: lungs clear to auscultation - no rales, rhonchi or wheezes  CV: regular rate and rhythm, normal S1 S2, no S3 or S4, no murmur, click or rub, no peripheral edema and peripheral pulses strong  ABDOMEN: soft, nontender, no hepatosplenomegaly, no masses and bowel sounds normal  MS: no musculoskeletal defects are noted and gait is age appropriate without ataxia  SKIN: no suspicious lesions or rashes  NEURO: mentation intact and speech normal  PSYCH: mentation appears normal and affect normal/bright.    Results for orders  "placed or performed in visit on 05/08/18   **Basic metabolic panel FUTURE 6mo   Result Value Ref Range    Sodium 141 133 - 144 mmol/L    Potassium 3.8 3.4 - 5.3 mmol/L    Chloride 103 94 - 109 mmol/L    Carbon Dioxide 31 20 - 32 mmol/L    Anion Gap 7 3 - 14 mmol/L    Glucose 85 70 - 99 mg/dL    Urea Nitrogen 11 7 - 30 mg/dL    Creatinine 0.68 0.52 - 1.04 mg/dL    GFR Estimate 87 >60 mL/min/1.7m2    GFR Estimate If Black >90 >60 mL/min/1.7m2    Calcium 9.2 8.5 - 10.1 mg/dL   Lipid panel reflex to direct LDL Fasting   Result Value Ref Range    Cholesterol 205 (H) <200 mg/dL    Triglycerides 69 <150 mg/dL    HDL Cholesterol 63 >49 mg/dL    LDL Cholesterol Calculated 128 (H) <100 mg/dL    Non HDL Cholesterol 142 (H) <130 mg/dL       No results found for this or any previous visit (from the past 744 hour(s)).      ASSESSMENT/PLAN:                                                        ICD-10-CM    1. Hypertension goal BP (blood pressure) < 140/90 I10 **Basic metabolic panel FUTURE 6mo   2. Advanced directives, counseling/discussion Z71.89    3. CARDIOVASCULAR SCREENING; LDL GOAL LESS THAN 130 Z13.6 Lipid panel reflex to direct LDL Fasting       Patient Instructions     Continue your calcium and vitamin D supplements (and include this in your diet): 1,200mg of calcium daily and vitamin D: 800-2,000 units per day. Try to engage in weight bearing exercise. Given that your recent vitamin D level is within normal limits, you may continue your current vitamin D amount.   Your bone density test to check for osteoporosis (called the \"DEXA scan\") is due in 2020.    Please complete the Prevnar 13 pneumonia vaccine soon.    We do advise at least once every 6 month visits for chronic conditions.  (You may schedule a physical on 12.27.18 or later, with fasting labs a few days before). We can do a skin check at the physical appointment.              Monthly Mole Check Chart  Anyone can get skin cancer. It doesn t matter what your skin " color is. Doing a monthly skin check is an important way to spot early signs of melanoma. Melanoma is the deadliest type of skin cancer. But if it s found early, it can be treated. Regular skin checks can help you track any changes in your skin.  Getting started  Each month, check your body for any spots such as freckles, age spots, and moles. Use this sheet to help you by doing the followin. Number each spot you find on the images below.  2. Record the details for each spot, along with the date, on the chart at the bottom of the page.  3. Keep all of your completed charts. This will help you track any changes in your skin over time.  What to look for  When doing a skin check, be sure to use the ABCDEs of melanoma. This means checking spots and moles for the following:      Asymmetry. One half of the mole is not like the other half.    Border. The edges are not smooth but ragged, notched, or blurred.    Color. Color varies from 1 part of the mole to another and may be tan, brown, or black. In some cases the color can be white, red, or blue.    Diameter. The mole is larger than 6 mm (size of a pencil eraser).    Evolving. The mole is getting larger or changing its shape or color.  How to check  Stand before a full-length mirror and check all parts of your body. For spots on your back or other areas you can't see, have a family member or friend do this for you. Or you can also use a hand mirror to check hard-to-see areas such as your back, buttocks, back of the neck, and scalp. To get a better look when checking your scalp, part your hair.  When to call your healthcare provider  Call your healthcare provider if any of your moles:    Hurt    Itch    Ooze    Bleed    Thicken    Become crusty    Show any of the ABCDEs of melanoma  Monthly Skin Check Chart  Date       Mole #    Asymmetry:  What is the mole's shape? Border of mole Color of mole Diameter of mole Evolving: How has mole changed?                                                                                                    Date Last Reviewed: 3/1/2017    6940-4041 [employer], [employer]. [address] [place] [address], [place] [address]. All rights reserved. This information is not intended as a substitute for professional medical care. Always follow your healthcare professional's instructions.            Phuong Persaud MD    [address] [address] [phone]

## 2018-05-17 NOTE — NURSING NOTE
"Chief Complaint   Patient presents with     Lipids     Hypertension     Health Maintenance     ADV, HIV, Microalbumin and Pneumovax       Initial /77  Pulse 63  Temp 98.9  F (37.2  C) (Oral)  Resp 18  Wt 156 lb (70.8 kg)  SpO2 98%  Breastfeeding? No  BMI 23.13 kg/m2 Estimated body mass index is 23.13 kg/(m^2) as calculated from the following:    Height as of 3/11/18: 5' 8.86\" (1.749 m).    Weight as of this encounter: 156 lb (70.8 kg).  Medication Reconciliation: complete      Garrett Tucker MA    "

## 2018-05-17 NOTE — Clinical Note
Please abstract: HIV 1/2 Ag/Ab 4th Generation 8/2/2017 [facility] Component Name Value Ref Range HIV 1/2 AG/AB 4thGEN Negative (Non Reactive)   Comment: HIV-1 p24 Ag and HIV-1/HIV-2 Ab not detected.  NEGNR  Result Narrative Performed at [facility], [address] [place] [address]  [id number]

## 2018-05-17 NOTE — PATIENT INSTRUCTIONS
"Continue your calcium and vitamin D supplements (and include this in your diet): 1,200mg of calcium daily and vitamin D: 800-2,000 units per day. Try to engage in weight bearing exercise. Given that your recent vitamin D level is within normal limits, you may continue your current vitamin D amount.   Your bone density test to check for osteoporosis (called the \"DEXA scan\") is due in .    Please complete the Prevnar 13 pneumonia vaccine soon.    We do advise at least once every 6 month visits for chronic conditions.  (You may schedule a physical on 18 or later, with fasting labs a few days before). We can do a skin check at the physical appointment.              Monthly Mole Check Chart  Anyone can get skin cancer. It doesn t matter what your skin color is. Doing a monthly skin check is an important way to spot early signs of melanoma. Melanoma is the deadliest type of skin cancer. But if it s found early, it can be treated. Regular skin checks can help you track any changes in your skin.  Getting started  Each month, check your body for any spots such as freckles, age spots, and moles. Use this sheet to help you by doing the followin. Number each spot you find on the images below.  2. Record the details for each spot, along with the date, on the chart at the bottom of the page.  3. Keep all of your completed charts. This will help you track any changes in your skin over time.  What to look for  When doing a skin check, be sure to use the ABCDEs of melanoma. This means checking spots and moles for the following:      Asymmetry. One half of the mole is not like the other half.    Border. The edges are not smooth but ragged, notched, or blurred.    Color. Color varies from 1 part of the mole to another and may be tan, brown, or black. In some cases the color can be white, red, or blue.    Diameter. The mole is larger than 6 mm (size of a pencil eraser).    Evolving. The mole is getting larger or changing " its shape or color.  How to check  Stand before a full-length mirror and check all parts of your body. For spots on your back or other areas you can't see, have a family member or friend do this for you. Or you can also use a hand mirror to check hard-to-see areas such as your back, buttocks, back of the neck, and scalp. To get a better look when checking your scalp, part your hair.  When to call your healthcare provider  Call your healthcare provider if any of your moles:    Hurt    Itch    Ooze    Bleed    Thicken    Become crusty    Show any of the ABCDEs of melanoma  Monthly Skin Check Chart  Date       Mole #    Asymmetry:  What is the mole's shape? Border of mole Color of mole Diameter of mole Evolving: How has mole changed?                                                                                                   Date Last Reviewed: 3/1/2017    0157-9820 [employer]. [address] [place] [address], [place] [address]. All rights reserved. This information is not intended as a substitute for professional medical care. Always follow your healthcare professional's instructions.

## 2018-08-07 ENCOUNTER — OFFICE VISIT (OUTPATIENT)
Dept: OPTOMETRY | Facility: CLINIC | Age: 68
End: 2018-08-07
Payer: COMMERCIAL

## 2018-08-07 DIAGNOSIS — H52.03 HYPEROPIA, BILATERAL: ICD-10-CM

## 2018-08-07 DIAGNOSIS — H52.221 REGULAR ASTIGMATISM, RIGHT EYE: ICD-10-CM

## 2018-08-07 DIAGNOSIS — H10.13 ALLERGIC CONJUNCTIVITIS, BILATERAL: ICD-10-CM

## 2018-08-07 DIAGNOSIS — H52.4 PRESBYOPIA: Primary | ICD-10-CM

## 2018-08-07 PROCEDURE — 92004 COMPRE OPH EXAM NEW PT 1/>: CPT | Performed by: OPTOMETRIST

## 2018-08-07 PROCEDURE — 92015 DETERMINE REFRACTIVE STATE: CPT | Performed by: OPTOMETRIST

## 2018-08-07 ASSESSMENT — REFRACTION_MANIFEST
OS_AXIS: 154
OD_CYLINDER: +0.50
OS_CYLINDER: +0.50
OS_SPHERE: +1.50
OD_SPHERE: +2.00
METHOD_AUTOREFRACTION: 1
OS_SPHERE: +2.00
OD_ADD: +2.25
OD_AXIS: 012
OD_AXIS: 005
OD_SPHERE: +2.25
OS_ADD: +2.25
OD_CYLINDER: +0.50
OS_CYLINDER: SPHERE

## 2018-08-07 ASSESSMENT — VISUAL ACUITY
OD_SC: 20/70
OD_CC: 20/30
OD_CC+: -1
OS_CC+: -1
OS_SC+: -1
METHOD: SNELLEN - LINEAR
OD_CC: 20/20
OS_CC: 20/20-2
OS_CC: 20/25
CORRECTION_TYPE: GLASSES
OS_SC: 20/70
OD_SC+: -2

## 2018-08-07 ASSESSMENT — KERATOMETRY
OS_K2POWER_DIOPTERS: 45.25
OD_K2POWER_DIOPTERS: 44.25
OD_AXISANGLE2_DEGREES: 23
OD_K1POWER_DIOPTERS: 44.50
OS_AXISANGLE2_DEGREES: 45
OS_K1POWER_DIOPTERS: 44.75

## 2018-08-07 ASSESSMENT — REFRACTION_WEARINGRX
OD_SPHERE: +2.00
OS_SPHERE: +1.50
OD_CYLINDER: +0.25
OS_CYLINDER: +0.25
OS_AXIS: 130
SPECS_TYPE: PAL
OS_ADD: +2.25
OD_ADD: +2.25
OD_AXIS: 175

## 2018-08-07 ASSESSMENT — CONF VISUAL FIELD
OS_NORMAL: 1
METHOD: COUNTING FINGERS
OD_NORMAL: 1

## 2018-08-07 ASSESSMENT — TONOMETRY
OS_IOP_MMHG: 18
OD_IOP_MMHG: 18
IOP_METHOD: APPLANATION

## 2018-08-07 ASSESSMENT — CUP TO DISC RATIO
OD_RATIO: 0.6
OS_RATIO: 0.6

## 2018-08-07 NOTE — PATIENT INSTRUCTIONS
Patient was advised of today's exam findings.  Fill glasses prescription  Use Zaditor or Alaway (generics of these work well)  allergy drop twice a day as needed for itchy eyes  Return in 1-2 years for eye exam    Melissa Skaggs O.D.  [facility]   [address] [place] [address] [phone]

## 2018-08-07 NOTE — LETTER
8/7/2018         RE: Florencia Ross  [address] [facility] [address]        Dear Colleague,    Thank you for referring your patient, Florencia Ross, to the [facility]. Please see a copy of my visit note below.    Chief Complaint   Patient presents with     COMPREHENSIVE EYE EXAM     New to Eye Dept          Last Eye Exam: 5/17/2016 with Dr Noah Calhoun   Dilated Previously: Yes, and does take eye a longer period of time to return to normal     What are you currently using to see?  Glasses, wears them pretty much all of the time        Distance Vision Acuity: Satisfied with vision, hasn't noticed any changes but would suspect that her Rx would have changed a bit since the last exam     Near Vision Acuity: Satisfied with vision while reading and using computer with glasses    Eye Comfort: good usually, is currrently having some allergy issues so they seem a little itchy   Do you use eye drops? : Yes: Only on rare occasion, for allergies Murine allergy rarely  Occupation or Hobbies: Retired - computer, reading exuctive assistant     Jane Apple Optometric Assistant           Medical, surgical and family histories reviewed and updated 8/7/2018.       OBJECTIVE: See Ophthalmology exam    ASSESSMENT:    ICD-10-CM    1. Presbyopia H52.4    2. Hyperopia, bilateral H52.03       PLAN:     Patient Instructions   Patient was advised of today's exam findings.  Fill glasses prescription  Use Zaditor or Alaway (generics of these work well)  allergy drop twice a day as needed for itchy eyes  Return in 1-2 years for eye exam    Melissa Skaggs O.D.  [facility]   [address] [address], [place] [address] [phone]           Again, thank you for allowing me to participate in the care of your patient.        Sincerely,        Melissa Skaggs, OD

## 2018-08-07 NOTE — MR AVS SNAPSHOT
After Visit Summary   8/7/2018    Florencia Ross    MRN: [id number]           Patient Information     Date Of Birth          1950        Visit Information        Provider Department      8/7/2018 2:40 PM Melissa Skaggs, [facility]        Today's Diagnoses     Presbyopia    -  1    Hyperopia, bilateral          Care Instructions    Patient was advised of today's exam findings.  Fill glasses prescription  Use Zaditor or Alaway (generics of these work well)  allergy drop twice a day as needed for itchy eyes  Return in 1-2 years for eye exam    Melissa Skaggs O.D.  [facility]   [address] [place] [address]  [phone]            Follow-ups after your visit        Who to contact     If you have questions or need follow up information about today's clinic visit or your schedule please contact [facility] directly at [phone].  Normal or non-critical lab and imaging results will be communicated to you by MyChart, letter or phone within 4 business days after the clinic has received the results. If you do not hear from us within 7 days, please contact the clinic through [employer] or phone. If you have a critical or abnormal lab result, we will notify you by phone as soon as possible.  Submit refill requests through [employer] or call your pharmacy and they will forward the refill request to us. Please allow 3 business days for your refill to be completed.          Additional Information About Your Visit        [employer] Information     [employer] gives you secure access to your electronic health record. If you see a primary care provider, you can also send messages to your care team and make appointments. If you have questions, please call your primary care clinic.  If you do not have a primary care provider, please call [phone] and they will assist you.        Care EveryWhere ID     This is your Care EveryWhere ID. This could be used by other  organizations to access your [facility] medical records  [id number]         Blood Pressure from Last 3 Encounters:   05/17/18 132/77   03/11/18 195/90   03/08/18 137/77    Weight from Last 3 Encounters:   05/17/18 70.8 kg (156 lb)   03/11/18 70.9 kg (156 lb 6 oz)   03/08/18 71.2 kg (157 lb)              Today, you had the following     No orders found for display         Today's Medication Changes          These changes are accurate as of 8/7/18  4:46 PM.  If you have any questions, ask your nurse or doctor.               These medicines have changed or have updated prescriptions.        Dose/Directions    aspirin 81 MG EC tablet   This may have changed:    - when to take this  - additional instructions   Used for:  Hypertension goal BP (blood pressure) < 140/90        Dose:  81 mg   Take 1 tablet by mouth daily.   Quantity:  90 tablet   Refills:  3                Primary Care Provider Office Phone # Fax #    Phuong Persaud [phone] [address] [facility] [address]        Equal Access to Services     [facility]: Hadii shimon tejada hadasho Sobee, waaxda luqadaha, qaybta kaalmada pastora, kendal curtis. So [facility] [phone].    ATENCIÓN: Si habla español, tiene a benjamin disposición servicios gratuitos de asistencia lingüística. [facility] [phone].    We comply with applicable federal civil rights laws and [place] laws. We do not discriminate on the basis of race, color, national origin, age, disability, sex, sexual orientation, or gender identity.            Thank you!     Thank you for choosing [facility]  for your care. Our goal is always to provide you with excellent care. Hearing back from our patients is one way we can continue to improve our services. Please take a few minutes to complete the written survey that you may receive in the mail after your visit with us. Thank you!             Your Updated Medication List -  Protect others around you: Learn how to safely use, store and throw away your medicines at www.disposemymeds.org.          This list is accurate as of 8/7/18  4:46 PM.  Always use your most recent med list.                   Brand Name Dispense Instructions for use Diagnosis    acyclovir 400 MG tablet    ZOVIRAX    180 tablet    Take 1 tablet (400 mg) by mouth 2 times daily    Recurrent genital herpes       aspirin 81 MG EC tablet     90 tablet    Take 1 tablet by mouth daily.    Hypertension goal BP (blood pressure) < 140/90       ONE-A-DAY WOMENS PO      Take  by mouth.        triamterene-hydrochlorothiazide 37.5-25 MG per tablet    MAXZIDE-25    90 tablet    Take 1 tablet by mouth daily    Hypertension goal BP (blood pressure) < 140/90       TUMS PO      As needed        Vitamin D-3 1000 units Caps      1,000 Units

## 2018-08-07 NOTE — PROGRESS NOTES
Chief Complaint   Patient presents with     COMPREHENSIVE EYE EXAM     New to Eye Dept          Last Eye Exam: 5/17/2016 with Dr Noah Calhoun   Dilated Previously: Yes, and does take eye a longer period of time to return to normal     What are you currently using to see?  Glasses, wears them pretty much all of the time        Distance Vision Acuity: Satisfied with vision, hasn't noticed any changes but would suspect that her Rx would have changed a bit since the last exam     Near Vision Acuity: Satisfied with vision while reading and using computer with glasses    Eye Comfort: good usually, is currrently having some allergy issues so they seem a little itchy   Do you use eye drops? : Yes: Only on rare occasion, for allergies Murine allergy rarely  Occupation or Hobbies: Retired - computer, reading exuctive assistant     Jane Apple Optometric Assistant           Medical, surgical and family histories reviewed and updated 8/7/2018.       OBJECTIVE: See Ophthalmology exam    ASSESSMENT:    ICD-10-CM    1. Presbyopia H52.4    2. Hyperopia, bilateral H52.03       PLAN:     Patient Instructions   Patient was advised of today's exam findings.  Fill glasses prescription  Use Zaditor or Alaway (generics of these work well)  allergy drop twice a day as needed for itchy eyes  Return in 1-2 years for eye exam    Melissa Skaggs O.D.  [facility]   [address] [place] [address] [phone]

## 2018-08-23 NOTE — PROGRESS NOTES
SUBJECTIVE:   Florencia Ross is a 68 year old female who presents to clinic today for the following health issues:      Hyperlipidemia Follow-Up      Rate your low fat/cholesterol diet?: good    Taking statin?  No    Other lipid medications/supplements?:  none    10 year CV risk: 10.4%.-calculated by an outside calculator    Hypokalemia:  No symptoms thereof;  Is on Maxzide.     Hypertension Follow-up      Outpatient blood pressures are not being checked.    Low Salt Diet: no added salt    recheck potassium levels per patient - needs new Rx        Reviewed and updated as needed this visit by clinical staff  Tobacco  Allergies  Meds  Problems  Med Hx  Surg Hx  Fam Hx  Soc Hx        Reviewed and updated as needed this visit by Provider  Meds  Problems           Patient Active Problem List   Diagnosis     Hypertension goal BP (blood pressure) < 140/90     CARDIOVASCULAR SCREENING; LDL GOAL LESS THAN 130     Recurrent genital herpes     Osteopenia     Wrinkling of orbital skin     Lentigo     KP (keratosis pilaris)     Angioma of skin     Photoaging of skin     Dysplastic nevus of right lower back s/p excision 7-23-15     Advanced directives, counseling/discussion       Past Medical History:   Diagnosis Date     Hypertension        Past Surgical History:   Procedure Laterality Date     GYN SURGERY  1983    c section       Family History   Problem Relation Age of Onset     Cerebrovascular Disease Mother      Glaucoma Mother 80     old age      Hypertension Father      Macular Degeneration No family hx of        Social History   Substance Use Topics     Smoking status: Former Smoker     Quit date: 7/11/1977     Smokeless tobacco: Never Used     Alcohol use Yes       Current Outpatient Prescriptions   Medication     acyclovir (ZOVIRAX) 400 MG tablet     aspirin 81 MG EC tablet     Calcium Carbonate Antacid (TUMS PO)     Cholecalciferol (VITAMIN D-3) 1000 UNITS CAPS     Multiple Vitamins-Calcium (ONE-A-DAY WOMENS  PO)     triamterene-hydrochlorothiazide (MAXZIDE-25) 37.5-25 MG per tablet     No current facility-administered medications for this visit.          ROS:  Constitutional, HEENT, cardiovascular, pulmonary, GI, , musculoskeletal, neuro, skin, endocrine and psych systems are negative, except as otherwise noted.     OBJECTIVE:                                                    /76  Pulse 60  Resp 14  Wt 152 lb (68.9 kg)  SpO2 100%  BMI 22.54 kg/m2     GENERAL APPEARANCE: healthy, alert and in no distress  EYES: Eyes grossly normal to inspection, and conjunctivae and sclerae normal  HENT: head normocephalic and atraumatic and mouth without ulcers or lesions, oropharynx clear and oral mucous membranes moist  NECK: no noticeable adenopathy, no asymmetry, masses, or scars   RESP: lungs clear to auscultation - no rales, rhonchi or wheezes  CV: regular rate and rhythm, normal S1 S2, no S3 or S4, no murmur, click or rub, no peripheral edema and peripheral pulses strong  ABDOMEN: soft, nontender, no hepatosplenomegaly, no masses and bowel sounds normal  MS: no musculoskeletal defects are noted and gait is age appropriate without ataxia  SKIN: no suspicious lesions or rashes  NEURO: mentation intact and speech normal  PSYCH: mentation appears normal and affect normal/bright.    Results for orders placed or performed in visit on 08/24/18   Basic metabolic panel   Result Value Ref Range    Sodium 139 133 - 144 mmol/L    Potassium 3.5 3.4 - 5.3 mmol/L    Chloride 102 94 - 109 mmol/L    Carbon Dioxide 31 20 - 32 mmol/L    Anion Gap 6 3 - 14 mmol/L    Glucose 89 70 - 99 mg/dL    Urea Nitrogen 9 7 - 30 mg/dL    Creatinine 0.62 0.52 - 1.04 mg/dL    GFR Estimate >90 >60 mL/min/1.7m2    GFR Estimate If Black >90 >60 mL/min/1.7m2    Calcium 9.4 8.5 - 10.1 mg/dL       No results found for this or any previous visit (from the past 744 hour(s)).      ASSESSMENT/PLAN:                                                         ICD-10-CM    1. Hypertension goal BP (blood pressure) < 140/90 I10 triamterene-hydrochlorothiazide (MAXZIDE-25) 37.5-25 MG per tablet   2. Recurrent genital herpes A60.00 acyclovir (ZOVIRAX) 400 MG tablet   3. Hypokalemia E87.6 Basic metabolic panel       Patient Instructions   [facility]  We will let you know if a potassium supplement is advised.   Please continue your current medications for now.    Please take aspirin 81mg daily consistently.  You have indicated that you would like to wait a few months to recheck your cholesterol and see if you will need to start a statin.    Please return to clinic in 6 months (we may advise a visit sooner, based on today's lab/test results).        Phuong Persaud MD    [facility]  [address] [address] [phone]

## 2018-08-24 ENCOUNTER — OFFICE VISIT (OUTPATIENT)
Dept: INTERNAL MEDICINE | Facility: CLINIC | Age: 68
End: 2018-08-24
Payer: COMMERCIAL

## 2018-08-24 VITALS
SYSTOLIC BLOOD PRESSURE: 147 MMHG | OXYGEN SATURATION: 100 % | BODY MASS INDEX: 22.54 KG/M2 | RESPIRATION RATE: 14 BRPM | HEART RATE: 60 BPM | WEIGHT: 152 LBS | DIASTOLIC BLOOD PRESSURE: 76 MMHG

## 2018-08-24 DIAGNOSIS — A60.00 RECURRENT GENITAL HERPES: ICD-10-CM

## 2018-08-24 DIAGNOSIS — E87.6 HYPOKALEMIA: ICD-10-CM

## 2018-08-24 DIAGNOSIS — I10 HYPERTENSION GOAL BP (BLOOD PRESSURE) < 140/90: Primary | ICD-10-CM

## 2018-08-24 LAB
ANION GAP SERPL CALCULATED.3IONS-SCNC: 6 MMOL/L (ref 3–14)
BUN SERPL-MCNC: 9 MG/DL (ref 7–30)
CALCIUM SERPL-MCNC: 9.4 MG/DL (ref 8.5–10.1)
CHLORIDE SERPL-SCNC: 102 MMOL/L (ref 94–109)
CO2 SERPL-SCNC: 31 MMOL/L (ref 20–32)
CREAT SERPL-MCNC: 0.62 MG/DL (ref 0.52–1.04)
GFR SERPL CREATININE-BSD FRML MDRD: >90 ML/MIN/1.7M2
GLUCOSE SERPL-MCNC: 89 MG/DL (ref 70–99)
POTASSIUM SERPL-SCNC: 3.5 MMOL/L (ref 3.4–5.3)
SODIUM SERPL-SCNC: 139 MMOL/L (ref 133–144)

## 2018-08-24 PROCEDURE — 36415 COLL VENOUS BLD VENIPUNCTURE: CPT | Performed by: INTERNAL MEDICINE

## 2018-08-24 PROCEDURE — 80048 BASIC METABOLIC PNL TOTAL CA: CPT | Performed by: INTERNAL MEDICINE

## 2018-08-24 PROCEDURE — 99214 OFFICE O/P EST MOD 30 MIN: CPT | Performed by: INTERNAL MEDICINE

## 2018-08-24 RX ORDER — TRIAMTERENE/HYDROCHLOROTHIAZID 37.5-25 MG
1 TABLET ORAL DAILY
Qty: 90 TABLET | Refills: 1 | Status: SHIPPED | OUTPATIENT
Start: 2018-08-24 | End: 2019-04-12

## 2018-08-24 RX ORDER — ACYCLOVIR 400 MG/1
400 TABLET ORAL 2 TIMES DAILY
Qty: 180 TABLET | Refills: 1 | Status: SHIPPED | OUTPATIENT
Start: 2018-08-24 | End: 2019-09-16

## 2018-08-24 NOTE — NURSING NOTE
"Chief Complaint   Patient presents with     RECHECK     Potassium     Health Maintenance       Initial /76  Pulse 60  Resp 14  Wt 152 lb (68.9 kg)  SpO2 100%  BMI 22.54 kg/m2 Estimated body mass index is 22.54 kg/(m^2) as calculated from the following:    Height as of 3/11/18: 5' 8.86\" (1.749 m).    Weight as of this encounter: 152 lb (68.9 kg).  Medication Reconciliation: complete    Susan Lam MA    "

## 2018-08-24 NOTE — PATIENT INSTRUCTIONS
We [facility] your [facility] results [facility]  We will let you know if a potassium supplement is advised.   Please continue your current medications for now.    Please take aspirin 81mg daily consistently.  You have indicated that you would like to wait a few months to recheck your cholesterol and see if you will need to start a statin.    Please return to clinic in 6 months (we may advise a visit sooner, based on today's lab/test results).

## 2018-08-24 NOTE — MR AVS SNAPSHOT
After Visit Summary   8/24/2018    Florencia Ross    MRN: [id number]           Patient Information     Date Of Birth          1950        Visit Information        Provider Department      8/24/2018 9:50 AM Phuong Persaud MD [facility]        Today's Diagnoses     Hypertension goal BP (blood pressure) < 140/90    -  1    Recurrent genital herpes        Hypokalemia          Care Instructions    We will let you know your test results by [employer].  We will let you know if a potassium supplement is advised.   Please continue your current medications for now.    Please take aspirin 81mg daily consistently.  You have indicated that you would like to wait a few months to recheck your cholesterol and see if you will need to start a statin.    Please return to clinic in 6 months (we may advise a visit sooner, based on today's lab/test results).            Follow-ups after your visit        Who to contact     If you have questions or need follow up information about today's clinic visit or your schedule please contact [facility] directly at [phone].  Normal or non-critical lab and imaging results will be communicated to you by MyChart, letter or phone within 4 business days after the clinic has received the results. If you do not hear from us within 7 days, please contact the clinic through [employer] or phone. If you have a critical or abnormal lab result, we will notify you by phone as soon as possible.  Submit refill requests through [employer] or call your pharmacy and they will forward the refill request to us. Please allow 3 business days for your refill to be completed.          Additional Information About Your Visit        [employer] Information     [employer] gives you secure access to your electronic health record. If you see a primary care provider, you can also send messages to your care team and make appointments. If you have questions, please call your primary  care clinic.  If you do not have a primary care provider, please call [phone] and they will assist you.        Care EveryWhere ID     This is your Care EveryWhere ID. This could be used by other organizations to access your [place] medical records  [id number]        Your Vitals Were     Pulse Respirations Pulse Oximetry BMI (Body Mass Index)          60 14 100% 22.54 kg/m2         Blood Pressure from Last 3 Encounters:   08/24/18 147/76   05/17/18 132/77   03/11/18 195/90    Weight from Last 3 Encounters:   08/24/18 152 lb (68.9 kg)   05/17/18 156 lb (70.8 kg)   03/11/18 156 lb 6 oz (70.9 kg)              Today, you had the following     No orders found for display         Today's Medication Changes          These changes are accurate as of 8/24/18 10:29 AM.  If you have any questions, ask your nurse or doctor.               These medicines have changed or have updated prescriptions.        Dose/Directions    aspirin 81 MG EC tablet   This may have changed:    - when to take this  - additional instructions   Used for:  Hypertension goal BP (blood pressure) < 140/90        Dose:  81 mg   Take 1 tablet by mouth daily.   Quantity:  90 tablet   Refills:  3            Where to get your medicines      These medications were sent to [place] [address] [place], [place] - [address] [facility]  [address] [address]     Phone:  [phone]     acyclovir 400 MG tablet    triamterene-hydrochlorothiazide 37.5-25 MG per tablet                Primary Care Provider Office Phone # Fax #    Phuong Persaud [phone] [address] [facility] [address]        Equal Access to Services     [facility] : Hadii shimon Carvajal, wavaishnavida lurocaeladaha, qaybta kaalmada pastora, kendal curtis. So [place] [phone].    ATENCIÓN: Si habla español, tiene a benjamin disposición servicios gratuitos de asistencia lingüística. Llame al  [phone].    We comply with applicable federal civil rights laws and [place] laws. We do not discriminate on the basis of race, color, national origin, age, disability, sex, sexual orientation, or gender identity.            Thank you!     Thank you for choosing [facility]  [facility] your [facility]. Our goal is always to provide you with excellent care. Hearing back from our patients is one way we can continue to improve our services. Please take a few minutes to complete the written survey that you may receive in the mail after your visit with us. Thank you!             Your Updated Medication List - Protect others around you: Learn how to safely use, store and throw away your medicines at www.disposemymeds.org.          This list is accurate as of 8/24/18 10:29 AM.  Always use your most recent med list.                   Brand Name Dispense Instructions for use Diagnosis    acyclovir 400 MG tablet    ZOVIRAX    180 tablet    Take 1 tablet (400 mg) by mouth 2 times daily    Recurrent genital herpes       aspirin 81 MG EC tablet     90 tablet    Take 1 tablet by mouth daily.    Hypertension goal BP (blood pressure) < 140/90       ONE-A-DAY WOMENS PO      Take  by mouth.        triamterene-hydrochlorothiazide 37.5-25 MG per tablet    MAXZIDE-25    90 tablet    Take 1 tablet by mouth daily    Hypertension goal BP (blood pressure) < 140/90       TUMS PO      As needed        Vitamin D-3 1000 units Caps      1,000 Units

## 2018-08-26 NOTE — PROGRESS NOTES
Dear Florencia,     Your test results are attached.    Your kidney function and blood electrolytes are within normal limits.     Please notify me via [employer] or contact the clinic at [phone] if you have any questions.    Phuong Persaud MD

## 2018-09-18 ENCOUNTER — TELEPHONE (OUTPATIENT)
Dept: INTERNAL MEDICINE | Facility: CLINIC | Age: 68
End: 2018-09-18

## 2018-09-18 NOTE — TELEPHONE ENCOUNTER
Patient is requesting a refill for Triamterene   Please call and let patient know if this will be refilled  Thank you

## 2018-11-30 ENCOUNTER — OFFICE VISIT (OUTPATIENT)
Dept: OBGYN | Facility: CLINIC | Age: 68
End: 2018-11-30
Payer: COMMERCIAL

## 2018-11-30 VITALS
HEART RATE: 73 BPM | OXYGEN SATURATION: 96 % | SYSTOLIC BLOOD PRESSURE: 160 MMHG | TEMPERATURE: 98.7 F | HEIGHT: 68 IN | WEIGHT: 156 LBS | DIASTOLIC BLOOD PRESSURE: 84 MMHG | BODY MASS INDEX: 23.64 KG/M2

## 2018-11-30 DIAGNOSIS — N30.01 ACUTE CYSTITIS WITH HEMATURIA: ICD-10-CM

## 2018-11-30 DIAGNOSIS — R30.0 DYSURIA: Primary | ICD-10-CM

## 2018-11-30 LAB
ALBUMIN UR-MCNC: ABNORMAL MG/DL
APPEARANCE UR: ABNORMAL
BACTERIA #/AREA URNS HPF: ABNORMAL /HPF
BILIRUB UR QL STRIP: NEGATIVE
COLOR UR AUTO: YELLOW
GLUCOSE UR STRIP-MCNC: NEGATIVE MG/DL
HGB UR QL STRIP: ABNORMAL
KETONES UR STRIP-MCNC: NEGATIVE MG/DL
LEUKOCYTE ESTERASE UR QL STRIP: ABNORMAL
NITRATE UR QL: NEGATIVE
NON-SQ EPI CELLS #/AREA URNS LPF: ABNORMAL /LPF
PH UR STRIP: 8 PH (ref 5–7)
RBC #/AREA URNS AUTO: ABNORMAL /HPF
SOURCE: ABNORMAL
SP GR UR STRIP: 1.02 (ref 1–1.03)
UROBILINOGEN UR STRIP-ACNC: 0.2 EU/DL (ref 0.2–1)
WBC #/AREA URNS AUTO: >100 /HPF

## 2018-11-30 PROCEDURE — 81001 URINALYSIS AUTO W/SCOPE: CPT | Performed by: NURSE PRACTITIONER

## 2018-11-30 PROCEDURE — 87086 URINE CULTURE/COLONY COUNT: CPT | Performed by: NURSE PRACTITIONER

## 2018-11-30 PROCEDURE — 99203 OFFICE O/P NEW LOW 30 MIN: CPT | Performed by: NURSE PRACTITIONER

## 2018-11-30 RX ORDER — SULFAMETHOXAZOLE/TRIMETHOPRIM 800-160 MG
1 TABLET ORAL 2 TIMES DAILY
Qty: 14 TABLET | Refills: 0 | Status: SHIPPED | OUTPATIENT
Start: 2018-11-30 | End: 2019-03-14

## 2018-11-30 ASSESSMENT — PAIN SCALES - GENERAL: PAINLEVEL: MILD PAIN (2)

## 2018-11-30 NOTE — NURSING NOTE
"Chief Complaint   Patient presents with     UTI       Initial /84  Pulse 73  Temp 98.7  F (37.1  C) (Oral)  Ht 5' 8\" (1.727 m)  Wt 156 lb (70.8 kg)  SpO2 96%  BMI 23.72 kg/m2 Estimated body mass index is 23.72 kg/(m^2) as calculated from the following:    Height as of this encounter: 5' 8\" (1.727 m).    Weight as of this encounter: 156 lb (70.8 kg)..  BP completed using cuff size: mimi Guthrie CMA    "

## 2018-11-30 NOTE — MR AVS SNAPSHOT
After Visit Summary   11/30/2018    Florencia Ross    MRN: [id number]           Patient Information     Date Of Birth          1950        Visit Information        Provider Department      11/30/2018 11:50 AM Amada Cox APRN CNP [facility]        Today's Diagnoses     Dysuria    -  1    Acute cystitis with hematuria           Follow-ups after your visit        Your next 10 appointments already scheduled     Dec 03, 2018 10:20 AM CST   New Patient with Shanel Cortez MD   [facility]    [address] [facility] [address] [phone]              Who to contact     If you have questions or need follow up information about today's clinic visit or your schedule please contact [facility] directly at [phone].  Normal or non-critical lab and imaging results will be communicated to you by MyChart, letter or phone within 4 business days after the clinic has received the results. If you do not hear from us within 7 days, please contact the clinic through MyChart or phone. If you have a critical or abnormal lab result, we will notify you by phone as soon as possible.  Submit refill requests through [employer] or call your pharmacy and they will forward the refill request to us. Please allow 3 business days for your refill to be completed.          Additional Information About Your Visit        MyChart Information     [employer] gives you secure access to your electronic health record. If you see a primary care provider, you can also send messages to your care team and make appointments. If you have questions, please call your primary care clinic.  If you do not have a primary care provider, please call [phone] and they will assist you.        Care EveryWhere ID     This is your Care EveryWhere ID. This could be used by other organizations to access your [place] medical records  [id number]        Your Vitals Were   "   Pulse Temperature Height Pulse Oximetry BMI (Body Mass Index)       73 98.7  F (37.1  C) (Oral) 5' 8\" (1.727 m) 96% 23.72 kg/m2        Blood Pressure from Last 3 Encounters:   11/30/18 160/84   08/24/18 147/76   05/17/18 132/77    Weight from Last 3 Encounters:   11/30/18 156 lb (70.8 kg)   08/24/18 152 lb (68.9 kg)   05/17/18 156 lb (70.8 kg)              We Performed the Following     UA with Microscopic     Urine Culture Aerobic Bacterial          Today's Medication Changes          These changes are accurate as of 11/30/18 12:09 PM.  If you have any questions, ask your nurse or doctor.               Start taking these medicines.        Dose/Directions    sulfamethoxazole-trimethoprim 800-160 MG tablet   Commonly known as:  BACTRIM DS/SEPTRA DS   Used for:  Acute cystitis with hematuria   Started by:  Amada Cox APRN CNP        Dose:  1 tablet   Take 1 tablet by mouth 2 times daily   Quantity:  14 tablet   Refills:  0         These medicines have changed or have updated prescriptions.        Dose/Directions    aspirin 81 MG EC tablet   Commonly known as:  ASA   This may have changed:    - when to take this  - additional instructions   Used for:  Hypertension goal BP (blood pressure) < 140/90        Dose:  81 mg   Take 1 tablet by mouth daily.   Quantity:  90 tablet   Refills:  3            Where to get your medicines      These medications were sent to [place] [address] [place], [place] - [address] [facility]  [address] [facility] [address]     Phone:  [phone]     sulfamethoxazole-trimethoprim 800-160 MG tablet                Primary Care Provider Office Phone # Fax #    [facility] [phone]       [address] [place] [address]        Equal Access to Services     DONG ZHANG AH: Kaylan Carvajal, asad lobo, leesa guillory, kendal curtis. So [facility] [phone].    ATENCIÓN: Si habla " español, tiene a benjamin disposición servicios gratuitos de asistencia lingüística. Joaquim christian [phone].    We comply with applicable federal civil rights laws and [place] laws. We do not discriminate on the basis of race, color, national origin, age, disability, sex, sexual orientation, or gender identity.            Thank you!     Thank you for choosing [facility]  [facility] your [facility]. Our goal is always to provide you with excellent care. Hearing back from our patients is one way we can continue to improve our services. Please take a few minutes to complete the written survey that you may receive in the mail after your visit with us. Thank you!             Your Updated Medication List - Protect others around you: Learn how to safely use, store and throw away your medicines at www.disposemymeds.org.          This list is accurate as of 11/30/18 12:09 PM.  Always use your most recent med list.                   Brand Name Dispense Instructions for use Diagnosis    acyclovir 400 MG tablet    ZOVIRAX    180 tablet    Take 1 tablet (400 mg) by mouth 2 times daily    Recurrent genital herpes       aspirin 81 MG EC tablet    ASA    90 tablet    Take 1 tablet by mouth daily.    Hypertension goal BP (blood pressure) < 140/90       ONE-A-DAY WOMENS PO      Take  by mouth.        sulfamethoxazole-trimethoprim 800-160 MG tablet    BACTRIM DS/SEPTRA DS    14 tablet    Take 1 tablet by mouth 2 times daily    Acute cystitis with hematuria       triamterene-HCTZ 37.5-25 MG tablet    MAXZIDE-25    90 tablet    Take 1 tablet by mouth daily    Hypertension goal BP (blood pressure) < 140/90       TUMS PO      As needed        Vitamin D-3 1000 units Caps      1,000 Units

## 2018-11-30 NOTE — PROGRESS NOTES
SUBJECTIVE:   Florencia Ross is a 68 year old female who presents to clinic today for the following health issues:      URINARY TRACT SYMPTOMS  Onset: 11/28/2018     Description:   Painful urination (Dysuria): YES  Blood in urine (Hematuria): YES  Delay in urine (Hesitency): no     Intensity: moderate    Progression of Symptoms:  worsening    Accompanying Signs & Symptoms:  Fever/chills: no   Flank pain no  Nausea and vomiting: no   Any vaginal symptoms: none  Abdominal/Pelvic Pain: no     History:   History of frequent UTI's: YES  History of kidney stones: no   Sexually Active: YES  Possibility of pregnancy: No    Precipitating factors:   Urinary frequency     Therapies Tried and outcome: Increase fluid intake    Symptoms worsened in the last day. Initially, was not having hematuria. Overnight, was seeing pink with urinating.   In addition to the hematuria, experiencing dysuria, frequency with incomplete emptying, urgency. Denies fevers, nausea, abnormal vaginal symptoms, low back pain, pelvic pain, STI concerns. Used to get infections often, but has not had any in the last 5+ years.     Problem list and histories reviewed & adjusted, as indicated.  Additional history: as documented    Patient Active Problem List   Diagnosis     Hypertension goal BP (blood pressure) < 140/90     CARDIOVASCULAR SCREENING; LDL GOAL LESS THAN 130     Recurrent genital herpes     Osteopenia     Wrinkling of orbital skin     Lentigo     KP (keratosis pilaris)     Angioma of skin     Photoaging of skin     Dysplastic nevus of right lower back s/p excision 7-23-15     Advanced directives, counseling/discussion     Past Surgical History:   Procedure Laterality Date     GYN SURGERY  1983    c section       Social History   Substance Use Topics     Smoking status: Former Smoker     Quit date: 7/11/1977     Smokeless tobacco: Never Used     Alcohol use Yes     Family History   Problem Relation Age of Onset     Cerebrovascular Disease Mother   "    Glaucoma Mother 80     old age      Hypertension Father      Macular Degeneration No family hx of            Reviewed and updated as needed this visit by clinical staff       Reviewed and updated as needed this visit by Provider         ROS:  Constitutional, HEENT, cardiovascular, pulmonary, gi and gu systems are negative, except as otherwise noted.    OBJECTIVE:     /84  Pulse 73  Temp 98.7  F (37.1  C) (Oral)  Ht 5' 8\" (1.727 m)  Wt 156 lb (70.8 kg)  SpO2 96%  BMI 23.72 kg/m2  Body mass index is 23.72 kg/(m^2).  GENERAL: healthy, alert and no distress  RESP: lungs clear to auscultation - no rales, rhonchi or wheezes  CV: regular rate and rhythm, normal S1 S2, no S3 or S4, no murmur, click or rub, no peripheral edema and peripheral pulses strong  ABDOMEN: soft, nontender, no hepatosplenomegaly, no masses and bowel sounds normal  MS: no gross musculoskeletal defects noted, no edema  SKIN: no suspicious lesions or rashes  BACK: no CVA tenderness, no paralumbar tenderness  PSYCH: mentation appears normal, affect normal/bright    Diagnostic Test Results:  Results for orders placed or performed in visit on 11/30/18 (from the past 24 hour(s))   UA with Microscopic   Result Value Ref Range    Color Urine Yellow     Appearance Urine Slightly Cloudy     Glucose Urine Negative NEG^Negative mg/dL    Bilirubin Urine Negative NEG^Negative    Ketones Urine Negative NEG^Negative mg/dL    Specific Gravity Urine 1.020 1.003 - 1.035    pH Urine 8.0 (H) 5.0 - 7.0 pH    Protein Albumin Urine Trace (A) NEG^Negative mg/dL    Urobilinogen Urine 0.2 0.2 - 1.0 EU/dL    Nitrite Urine Negative NEG^Negative    Blood Urine Large (A) NEG^Negative    Leukocyte Esterase Urine Moderate (A) NEG^Negative    Source Midstream Urine     WBC Urine >100 (A) OTO5^0 - 5 /HPF    RBC Urine 25-50 (A) OTO2^O - 2 /HPF    Squamous Epithelial /LPF Urine Few FEW^Few /LPF    Bacteria Urine Few (A) NEG^Negative /HPF       ASSESSMENT/PLAN:   1. " Dysuria  - Urine Culture Aerobic Bacterial  - UA with Microscopic    2. Acute cystitis with hematuria  Patient is given prescription for Bactrim DS-has tolerated without difficulty in the past and we will notify her of her urine culture results when available and if a change in medication is needed. Also push fluids and avoid bladder irritants.  Call or return to clinic prn if these symptoms worsen or fail to improve as anticipated.   - sulfamethoxazole-trimethoprim (BACTRIM DS/SEPTRA DS) 800-160 MG tablet; Take 1 tablet by mouth 2 times daily  Dispense: 14 tablet; Refill: 0    TEREZA Kent [facility]

## 2018-12-01 LAB
BACTERIA SPEC CULT: NORMAL
SPECIMEN SOURCE: NORMAL

## 2018-12-03 ENCOUNTER — OFFICE VISIT (OUTPATIENT)
Dept: FAMILY MEDICINE | Facility: CLINIC | Age: 68
End: 2018-12-03
Payer: COMMERCIAL

## 2018-12-03 VITALS
HEIGHT: 69 IN | HEART RATE: 62 BPM | OXYGEN SATURATION: 99 % | SYSTOLIC BLOOD PRESSURE: 136 MMHG | BODY MASS INDEX: 22.66 KG/M2 | WEIGHT: 153 LBS | RESPIRATION RATE: 17 BRPM | DIASTOLIC BLOOD PRESSURE: 74 MMHG

## 2018-12-03 DIAGNOSIS — Z12.11 SCREEN FOR COLON CANCER: ICD-10-CM

## 2018-12-03 DIAGNOSIS — R01.1 HEART MURMUR: ICD-10-CM

## 2018-12-03 DIAGNOSIS — I10 HYPERTENSION GOAL BP (BLOOD PRESSURE) < 140/90: Primary | ICD-10-CM

## 2018-12-03 DIAGNOSIS — Z23 NEED FOR VACCINATION FOR PNEUMOCOCCUS: ICD-10-CM

## 2018-12-03 DIAGNOSIS — Z12.31 ENCOUNTER FOR SCREENING MAMMOGRAM FOR BREAST CANCER: ICD-10-CM

## 2018-12-03 DIAGNOSIS — Z13.6 CARDIOVASCULAR SCREENING; LDL GOAL LESS THAN 130: ICD-10-CM

## 2018-12-03 LAB
ANION GAP SERPL CALCULATED.3IONS-SCNC: 7 MMOL/L (ref 3–14)
BUN SERPL-MCNC: 10 MG/DL (ref 7–30)
CALCIUM SERPL-MCNC: 9 MG/DL (ref 8.5–10.1)
CHLORIDE SERPL-SCNC: 101 MMOL/L (ref 94–109)
CHOLEST SERPL-MCNC: 223 MG/DL
CO2 SERPL-SCNC: 27 MMOL/L (ref 20–32)
CREAT SERPL-MCNC: 0.75 MG/DL (ref 0.52–1.04)
GFR SERPL CREATININE-BSD FRML MDRD: 77 ML/MIN/1.7M2
GLUCOSE SERPL-MCNC: 90 MG/DL (ref 70–99)
HDLC SERPL-MCNC: 64 MG/DL
LDLC SERPL CALC-MCNC: 145 MG/DL
NONHDLC SERPL-MCNC: 159 MG/DL
POTASSIUM SERPL-SCNC: 3.8 MMOL/L (ref 3.4–5.3)
SODIUM SERPL-SCNC: 135 MMOL/L (ref 133–144)
TRIGL SERPL-MCNC: 70 MG/DL

## 2018-12-03 PROCEDURE — G0009 ADMIN PNEUMOCOCCAL VACCINE: HCPCS | Performed by: FAMILY MEDICINE

## 2018-12-03 PROCEDURE — 90670 PCV13 VACCINE IM: CPT | Performed by: FAMILY MEDICINE

## 2018-12-03 PROCEDURE — 99214 OFFICE O/P EST MOD 30 MIN: CPT | Mod: 25 | Performed by: FAMILY MEDICINE

## 2018-12-03 PROCEDURE — 80061 LIPID PANEL: CPT | Performed by: FAMILY MEDICINE

## 2018-12-03 PROCEDURE — 36415 COLL VENOUS BLD VENIPUNCTURE: CPT | Performed by: FAMILY MEDICINE

## 2018-12-03 PROCEDURE — 80048 BASIC METABOLIC PNL TOTAL CA: CPT | Performed by: FAMILY MEDICINE

## 2018-12-03 NOTE — PROGRESS NOTES
"  SUBJECTIVE:   Florencia Ross is a 68 year old female who presents to clinic today for the following health issues:        Follow up on choesterol, not taking medications, fasting    PT with htn, controlled on medication  Pt was advised previously to start cholesterol medication  Pt declined but would like to have her cholesterol rechecked as it has been 6 months    Pt due for colonoscopy and mammogram and pneumo 13 and is agreeable    Pt with cardiac murmur,no previous echos. No symptoms              Problem list and histories reviewed & adjusted, as indicated.  Additional history: as documented    Labs reviewed in EPIC    Reviewed and updated as needed this visit by clinical staff  Tobacco  Allergies  Meds  Med Hx  Surg Hx  Fam Hx  Soc Hx      Reviewed and updated as needed this visit by Provider         ROS:  Constitutional, HEENT, cardiovascular, pulmonary, gi and gu systems are negative, except as otherwise noted.    OBJECTIVE:     /74  Pulse 62  Resp 17  Ht 5' 8.75\" (1.746 m)  Wt 153 lb (69.4 kg)  SpO2 99%  BMI 22.76 kg/m2  Body mass index is 22.76 kg/(m^2).  GENERAL: healthy, alert and no distress  NECK: no adenopathy, no asymmetry, masses, or scars and thyroid normal to palpation  RESP: lungs clear to auscultation - no rales, rhonchi or wheezes  CV: regular rates and rhythm, normal S1 S2, no S3 or S4, grade 2/6 systolic murmur heard best over the aorta, peripheral pulses strong and no peripheral edema  ABDOMEN: soft, nontender, no hepatosplenomegaly, no masses and bowel sounds normal  MS: no gross musculoskeletal defects noted, no edema    Diagnostic Test Results:  none     ASSESSMENT/PLAN:     1. Hypertension goal BP (blood pressure) < 140/90  labwork , follow-up in 6 months  - Basic metabolic panel    2. Heart murmur  Await results of echo  - Echocardiogram Complete; Future    3. Need for vaccination for pneumococcus  given  - Pneumococcal vaccine 13 valent PCV13 IM (Prevnar) [03960]  - " ADMIN: Vaccine, Initial (16654)  - ADMIN MEDICARE: Pneumococcal Vaccine ()    4. Screen for colon cancer  Pt agreeable to colonoscoy  - GASTROENTEROLOGY ADULT REF PROCEDURE ONLY [facility] [phone]    5. Encounter for screening mammogram for breast cancer  scheduled  - MA Screening Digital Bilateral; Future    6. CARDIOVASCULAR SCREENING; LDL GOAL LESS THAN 130  recheck  - Lipid panel reflex to direct LDL Fasting        Shanel Hartman MD  [facility]

## 2018-12-03 NOTE — MR AVS SNAPSHOT
After Visit Summary   12/3/2018    Florencia Ross    MRN: [id number]           Patient Information     Date Of Birth          1950        Visit Information        Provider Department      12/3/2018 10:20 AM Shanel Cortez MD [facility]        Today's Diagnoses     Hypertension goal BP (blood pressure) < 140/90    -  1    Heart murmur        Need for vaccination for pneumococcus        Screen for colon cancer        Encounter for screening mammogram for breast cancer        CARDIOVASCULAR SCREENING; LDL GOAL LESS THAN 130           Follow-ups after your visit        Additional Services     GASTROENTEROLOGY ADULT REF PROCEDURE ONLY [facility] [phone]       Last Lab Result: Creatinine (mg/dL)       Date                     Value                 08/24/2018               0.62             ----------  Body mass index is 22.76 kg/(m^2).     Needed:  No  Language:  English    Patient will be contacted to schedule procedure.     Please be aware that coverage of these services is subject to the terms and limitations of your health insurance plan.  Call member services at your health plan with any benefit or coverage questions.  Any procedures must be performed at a [facility] facility OR coordinated by your clinic's referral office.    Please bring the following with you to your appointment:    (1) Any X-Rays, CTs or MRIs which have been performed.  Contact the facility where they were done to arrange for  prior to your scheduled appointment.    (2) List of current medications   (3) This referral request   (4) Any documents/labs given to you for this referral                  Follow-up notes from your care team     Return in about 6 months (around 6/3/2019) for BP Recheck.      Your next 10 appointments already scheduled     Jan 11, 2019 10:00 AM JACQUELINE   MA SCREENING DIGITAL BILATERAL with ANDMA1   [facility]    [id number]  "[facility] [id number] [phone]           How do I prepare for my exam? (Food and drink instructions) No Food and Drink Restrictions.  How do I prepare for my exam? (Other instructions) Do not use any powder, lotion or deodorant under your arms or on your breast. If you do, we will ask you to remove it before your exam.  What should I wear: Wear comfortable, two-piece clothing.  How long does the exam take: Most scans will take 15 minutes.  What should I bring: Bring any previous mammograms from other facilities or have them mailed to the breast center.  Do I need a :  No  is needed.  What do I need to tell my doctor: If you have any allergies, tell your care team.  What should I do after the exam: No restrictions, You may resume normal activities.  What is this test: This test is an x-ray of the breast to look for breast disease. The breast is pressed between two plates to flatten and spread the tissue. An X-ray is taken of the breast from different angles.  Who should I call with questions: If you have any questions, please call the Imaging Department where you will have your exam. Directions, parking instructions, and other information is available on our website, [email or web address].  Other information about my exam Three-dimensional (3D) mammograms are available at [facility] locations in [facility], [facility], [place], [facility] and [place]. [facility] locations include [place] and the [facility] in [place].  Benefits of 3D mammograms include * Improved rate of cancer detection * Decreases your chance of having to go back for more tests, which means fewer: * \"False-positive\" results (This means that there is an abnormal area but it isn't cancer.) * Invasive testing procedures, such as a biopsy or surgery * Can provide clearer images of the breast if you have dense breast tissue.  *3D mammography is an optional exam that anyone can " "have with a 2D mammogram. It doesn't replace or take the place of a 2D mammogram. 2D mammograms remain an effective screening test for all women.  Not all insurance companies cover the cost of a 3D mammogram. Check with your insurance.              Future tests that were ordered for you today     Open Future Orders        Priority Expected Expires Ordered    Echocardiogram Complete Routine  12/3/2019 12/3/2018    MA Screening Digital Bilateral Routine  12/3/2019 12/3/2018            Who to contact     If you have questions or need follow up information about today's clinic visit or your schedule please contact [facility] directly at [phone].  Normal or non-critical lab and imaging results will be communicated to you by MyChart, letter or phone within 4 business days after the clinic has received the results. If you do not hear from us within 7 days, please contact the clinic through [employer] or phone. If you have a critical or abnormal lab result, we will notify you by phone as soon as possible.  Submit refill requests through [employer] or call your pharmacy and they will forward the refill request to us. Please allow 3 business days for your refill to be completed.          Additional Information About Your Visit        [employer] Information     [employer] gives you secure access to your electronic health record. If you see a primary care provider, you can also send messages to your care team and make appointments. If you have questions, please call your primary care clinic.  If you do not have a primary care provider, please call [phone] and they will assist you.        Care EveryWhere ID     This is your Care EveryWhere ID. This could be used by other organizations to access your [facility] medical records  [id number]        Your Vitals Were     Pulse Respirations Height Pulse Oximetry BMI (Body Mass Index)       62 17 5' 8.75\" (1.746 m) 99% 22.76 kg/m2        Blood Pressure from Last 3 " Encounters:   12/03/18 136/74   11/30/18 160/84   08/24/18 147/76    Weight from Last 3 Encounters:   12/03/18 153 lb (69.4 kg)   11/30/18 156 lb (70.8 kg)   08/24/18 152 lb (68.9 kg)              We Performed the Following     ADMIN MEDICARE: Pneumococcal Vaccine ()     ADMIN: Vaccine, Initial (31383)     Basic metabolic panel     GASTROENTEROLOGY ADULT REF PROCEDURE ONLY [facility] [phone]     Lipid panel reflex to direct LDL Fasting     Pneumococcal vaccine 13 valent PCV13 IM (Prevnar) [05685]          Today's Medication Changes          These changes are accurate as of 12/3/18 11:24 AM.  If you have any questions, ask your nurse or doctor.               These medicines have changed or have updated prescriptions.        Dose/Directions    aspirin 81 MG EC tablet   Commonly known as:  ASA   This may have changed:    - when to take this  - additional instructions   Used for:  Hypertension goal BP (blood pressure) < 140/90        Dose:  81 mg   Take 1 tablet by mouth daily.   Quantity:  90 tablet   Refills:  3                Primary Care Provider Office Phone # Fax #    [facility] [phone] [address] [facility] [address]        Equal Access to Services     DONG ZHANG : Kaylan fraireo Sobee, waaxda luqadaha, qaybta kaalmada adeegyada, kendal curtis. So [facility] [phone].    ATENCIÓN: Si habla español, tiene a benjamin disposición servicios gratuitos de asistencia lingüística. Llame al [phone].    We comply with applicable federal civil rights laws and [place] laws. We do not discriminate on the basis of race, color, national origin, age, disability, sex, sexual orientation, or gender identity.            Thank you!     Thank you for choosing [facility]  for your care. Our goal is always to provide you with excellent care. Hearing back from our patients is one way we can continue to improve our services.  Please take a few minutes to complete the written survey that you may receive in the mail after your visit with us. Thank you!             Your Updated Medication List - Protect others around you: Learn how to safely use, store and throw away your medicines at www.disposemymeds.org.          This list is accurate as of 12/3/18 11:24 AM.  Always use your most recent med list.                   Brand Name Dispense Instructions for use Diagnosis    acyclovir 400 MG tablet    ZOVIRAX    180 tablet    Take 1 tablet (400 mg) by mouth 2 times daily    Recurrent genital herpes       aspirin 81 MG EC tablet    ASA    90 tablet    Take 1 tablet by mouth daily.    Hypertension goal BP (blood pressure) < 140/90       ONE-A-DAY WOMENS PO      Take  by mouth.        sulfamethoxazole-trimethoprim 800-160 MG tablet    BACTRIM DS/SEPTRA DS    14 tablet    Take 1 tablet by mouth 2 times daily    Acute cystitis with hematuria       triamterene-HCTZ 37.5-25 MG tablet    MAXZIDE-25    90 tablet    Take 1 tablet by mouth daily    Hypertension goal BP (blood pressure) < 140/90       TUMS PO      As needed        Vitamin D-3 1000 units Caps      1,000 Units

## 2018-12-03 NOTE — NURSING NOTE
Screening Questionnaire for Adult Immunization    Are you sick today?   No   Do you have allergies to medications, food, a vaccine component or latex?   No   Have you ever had a serious reaction after receiving a vaccination?   No   Do you have a long-term health problem with heart disease, lung disease, asthma, kidney disease, metabolic disease (e.g. diabetes), anemia, or other blood disorder?   No   Do you have cancer, leukemia, HIV/AIDS, or any other immune system problem?   No   In the past 3 months, have you taken medications that affect  your immune system, such as prednisone, other steroids, or anticancer drugs; drugs for the treatment of rheumatoid arthritis, Crohn s disease, or psoriasis; or have you had radiation treatments?   No   Have you had a seizure, or a brain or other nervous system problem?   No   During the past year, have you received a transfusion of blood or blood     products, or been given immune (gamma) globulin or antiviral drug?   No   For women: Are you pregnant or is there a chance you could become        pregnant during the next month?   No   Have you received any vaccinations in the past 4 weeks?   No     Immunization questionnaire answers were all negative.        Screening performed by Gloria Tinsley on 12/3/2018 at 11:13 AM.

## 2018-12-07 ENCOUNTER — RADIANT APPOINTMENT (OUTPATIENT)
Dept: CARDIOLOGY | Facility: CLINIC | Age: 68
End: 2018-12-07
Attending: FAMILY MEDICINE
Payer: COMMERCIAL

## 2018-12-07 DIAGNOSIS — R01.1 HEART MURMUR: ICD-10-CM

## 2018-12-07 PROCEDURE — 93306 TTE W/DOPPLER COMPLETE: CPT

## 2018-12-08 ENCOUNTER — MYC MEDICAL ADVICE (OUTPATIENT)
Dept: FAMILY MEDICINE | Facility: CLINIC | Age: 68
End: 2018-12-08

## 2018-12-10 NOTE — TELEPHONE ENCOUNTER
Yes have her monitor it and for other symptoms. Since not bothersome, would watch for now.     Shanel Hartman MD

## 2018-12-10 NOTE — TELEPHONE ENCOUNTER
GameWith message sent to patient.   See message for details.    Arielle Matute, KAMERONN RN         hard copy, drawn during this pregnancy

## 2018-12-10 NOTE — TELEPHONE ENCOUNTER
Patient has already read her Result Note about echocardiogram.     Routing patient pictures of new rash to PCP to advise.   Patient had pneumonia immunization 1 week ago.  Denies itching or fever.     RN can give home care instructions if PCP desires.  KAMERON HuangN RN

## 2019-01-11 ENCOUNTER — HOSPITAL ENCOUNTER (OUTPATIENT)
Facility: AMBULATORY SURGERY CENTER | Age: 69
End: 2019-01-11
Attending: SURGERY | Admitting: SURGERY

## 2019-02-18 ENCOUNTER — TELEPHONE (OUTPATIENT)
Dept: SURGERY | Facility: CLINIC | Age: 69
End: 2019-02-18

## 2019-02-18 NOTE — TELEPHONE ENCOUNTER
Location: [facility]  The name of the procedure: Colonoscopy  Provider scheduled with: Dr. Singer  Date 03/14/2019, Time 10:45am  Pharmacy Name, and Fax #:   Prep Questions: patient rescheduled from 2/21 to 3/14. She also has prep questions regarding Sodium citrate.

## 2019-02-20 ENCOUNTER — TELEPHONE (OUTPATIENT)
Dept: FAMILY MEDICINE | Facility: CLINIC | Age: 69
End: 2019-02-20

## 2019-02-20 NOTE — TELEPHONE ENCOUNTER
Panel Management Review           Composite cancer screening  Chart review shows that this patient is due/due soon for the following Colonoscopy, Fecal Colorectal (FIT) or cologuard.    Summary:    Patient is due/failing the following:   Colonoscopy, Fecal Colorectal (FIT) or cologuard.      Action needed:   Colonoscopy, Fecal Colorectal (FIT) or cologuard.      Type of outreach:    Sent idemama message.    Questions for provider review:    None                                                                                                                                    Melissa Mercedes CMA       Chart routed to close .

## 2019-02-25 NOTE — TELEPHONE ENCOUNTER
Patient has been canceled on 03/14/2019 due to Dr Singer being on vacation, I have left a voice message for patient to call and reschedule.

## 2019-03-14 ENCOUNTER — HOSPITAL ENCOUNTER (OUTPATIENT)
Facility: AMBULATORY SURGERY CENTER | Age: 69
Discharge: HOME | End: 2019-03-14
Attending: INTERNAL MEDICINE | Admitting: INTERNAL MEDICINE
Payer: COMMERCIAL

## 2019-03-14 ENCOUNTER — SURGERY (OUTPATIENT)
Age: 69
End: 2019-03-14
Payer: COMMERCIAL

## 2019-03-14 VITALS
TEMPERATURE: 97.6 F | HEART RATE: 61 BPM | RESPIRATION RATE: 16 BRPM | DIASTOLIC BLOOD PRESSURE: 69 MMHG | OXYGEN SATURATION: 98 % | SYSTOLIC BLOOD PRESSURE: 131 MMHG

## 2019-03-14 LAB — COLONOSCOPY: NORMAL

## 2019-03-14 PROCEDURE — G8907 PT DOC NO EVENTS ON DISCHARG: HCPCS

## 2019-03-14 PROCEDURE — G8918 PT W/O PREOP ORDER IV AB PRO: HCPCS

## 2019-03-14 PROCEDURE — 88305 TISSUE EXAM BY PATHOLOGIST: CPT | Performed by: INTERNAL MEDICINE

## 2019-03-14 PROCEDURE — 45380 COLONOSCOPY AND BIOPSY: CPT | Mod: 33 | Performed by: INTERNAL MEDICINE

## 2019-03-14 PROCEDURE — 45380 COLONOSCOPY AND BIOPSY: CPT | Mod: PT

## 2019-03-14 RX ORDER — LIDOCAINE 40 MG/G
CREAM TOPICAL
Status: DISCONTINUED | OUTPATIENT
Start: 2019-03-14 | End: 2019-03-15 | Stop reason: HOSPADM

## 2019-03-14 RX ORDER — FENTANYL CITRATE 50 UG/ML
INJECTION, SOLUTION INTRAMUSCULAR; INTRAVENOUS PRN
Status: DISCONTINUED | OUTPATIENT
Start: 2019-03-14 | End: 2019-03-14 | Stop reason: HOSPADM

## 2019-03-14 RX ORDER — ONDANSETRON 2 MG/ML
4 INJECTION INTRAMUSCULAR; INTRAVENOUS
Status: DISCONTINUED | OUTPATIENT
Start: 2019-03-14 | End: 2019-03-15 | Stop reason: HOSPADM

## 2019-03-14 RX ADMIN — FENTANYL CITRATE 50 MCG: 50 INJECTION, SOLUTION INTRAMUSCULAR; INTRAVENOUS at 11:53

## 2019-03-18 LAB — COPATH REPORT: NORMAL

## 2019-03-26 ENCOUNTER — TRANSFERRED RECORDS (OUTPATIENT)
Dept: HEALTH INFORMATION MANAGEMENT | Facility: CLINIC | Age: 69
End: 2019-03-26

## 2019-04-12 DIAGNOSIS — I10 HYPERTENSION GOAL BP (BLOOD PRESSURE) < 140/90: ICD-10-CM

## 2019-04-12 RX ORDER — TRIAMTERENE/HYDROCHLOROTHIAZID 37.5-25 MG
1 TABLET ORAL DAILY
Qty: 90 TABLET | Refills: 1 | Status: SHIPPED | OUTPATIENT
Start: 2019-04-12 | End: 2019-10-17

## 2019-04-16 ENCOUNTER — E-VISIT (OUTPATIENT)
Dept: FAMILY MEDICINE | Facility: CLINIC | Age: 69
End: 2019-04-16
Payer: COMMERCIAL

## 2019-04-16 DIAGNOSIS — N30.01 ACUTE CYSTITIS WITH HEMATURIA: ICD-10-CM

## 2019-04-16 DIAGNOSIS — R30.0 DYSURIA: Primary | ICD-10-CM

## 2019-04-16 PROCEDURE — 99444 ZZC PHYSICIAN ONLINE EVALUATION & MANAGEMENT SERVICE: CPT | Performed by: FAMILY MEDICINE

## 2019-04-17 ENCOUNTER — MYC MEDICAL ADVICE (OUTPATIENT)
Dept: FAMILY MEDICINE | Facility: CLINIC | Age: 69
End: 2019-04-17

## 2019-04-18 ENCOUNTER — TELEPHONE (OUTPATIENT)
Dept: FAMILY MEDICINE | Facility: CLINIC | Age: 69
End: 2019-04-18

## 2019-04-18 DIAGNOSIS — R30.0 DYSURIA: ICD-10-CM

## 2019-04-18 DIAGNOSIS — N39.0 URINARY TRACT INFECTION: Primary | ICD-10-CM

## 2019-04-18 DIAGNOSIS — R82.90 NONSPECIFIC FINDING ON EXAMINATION OF URINE: Primary | ICD-10-CM

## 2019-04-18 LAB
ALBUMIN UR-MCNC: 100 MG/DL
APPEARANCE UR: ABNORMAL
BACTERIA #/AREA URNS HPF: ABNORMAL /HPF
BILIRUB UR QL STRIP: NEGATIVE
COLOR UR AUTO: ABNORMAL
GLUCOSE UR STRIP-MCNC: NEGATIVE MG/DL
HGB UR QL STRIP: ABNORMAL
KETONES UR STRIP-MCNC: NEGATIVE MG/DL
LEUKOCYTE ESTERASE UR QL STRIP: ABNORMAL
NITRATE UR QL: NEGATIVE
PH UR STRIP: 6.5 PH (ref 5–7)
RBC #/AREA URNS AUTO: >100 /HPF
SOURCE: ABNORMAL
SP GR UR STRIP: 1.02 (ref 1–1.03)
TRANS CELLS #/AREA URNS HPF: ABNORMAL /HPF
UROBILINOGEN UR STRIP-ACNC: 0.2 EU/DL (ref 0.2–1)
WBC #/AREA URNS AUTO: >100 /HPF

## 2019-04-18 PROCEDURE — 87086 URINE CULTURE/COLONY COUNT: CPT | Performed by: FAMILY MEDICINE

## 2019-04-18 PROCEDURE — 81001 URINALYSIS AUTO W/SCOPE: CPT | Performed by: FAMILY MEDICINE

## 2019-04-18 RX ORDER — CEPHALEXIN 500 MG/1
500 CAPSULE ORAL 2 TIMES DAILY
Qty: 14 CAPSULE | Refills: 0 | Status: SHIPPED | OUTPATIENT
Start: 2019-04-18 | End: 2019-04-29

## 2019-04-18 NOTE — TELEPHONE ENCOUNTER
Rx sent for keflex 500 mg bid x 1 week  Due to her corn oil allergy, cannot do cipro. Bactrim nor macrobid   Please call pt and let her know    Shanel Hartman MD

## 2019-04-18 NOTE — TELEPHONE ENCOUNTER
Patient finally dropped off urine sample that was ordered in this week's E-visit.  It is abnormal.     Please comment on results.    Arielle Matute RN, BSN

## 2019-04-18 NOTE — TELEPHONE ENCOUNTER
Called patient and gave providers message/ instructions.  Patient states she understands this.     Arielle Matute RN, BSN

## 2019-04-19 LAB
BACTERIA SPEC CULT: NO GROWTH
SPECIMEN SOURCE: NORMAL

## 2019-04-25 NOTE — PROGRESS NOTES
"  SUBJECTIVE:   Florencia Ross is a 68 year old female who presents to clinic today for the following   health issues:      URINARY TRACT SYMPTOMS      Follow up , no current symptoms    Pt with recent evisit  Noted blood in urine.   UC was negative  pts symptoms resolved within hours  She suspects it was a small kidney stones  No further gross hematuria  Will check UMAC        Additional history: as documented    Reviewed  and updated as needed this visit by clinical staff         Reviewed and updated as needed this visit by Provider         Labs reviewed in EPIC    ROS:  Constitutional, HEENT, cardiovascular, pulmonary, gi and gu systems are negative, except as otherwise noted.    OBJECTIVE:     /86   Pulse 62   Temp 99.1  F (37.3  C) (Oral)   Ht 1.746 m (5' 8.75\")   Wt 68.5 kg (151 lb)   BMI 22.46 kg/m    Body mass index is 22.46 kg/m .  GENERAL: healthy, alert and no distress    Diagnostic Test Results:  Urinalysis - negative    ASSESSMENT/PLAN:     1. Urinary tract infection  Gross hematuria has resolved. No further follow-up unless new or recurrent symptoms  - *UA reflex to Microscopic and Culture ([place] [facility] (except [facility] and [place])        Shanel Hartman MD  [facility]        "

## 2019-04-29 ENCOUNTER — OFFICE VISIT (OUTPATIENT)
Dept: FAMILY MEDICINE | Facility: CLINIC | Age: 69
End: 2019-04-29
Payer: COMMERCIAL

## 2019-04-29 VITALS
DIASTOLIC BLOOD PRESSURE: 86 MMHG | SYSTOLIC BLOOD PRESSURE: 138 MMHG | WEIGHT: 151 LBS | HEIGHT: 69 IN | TEMPERATURE: 99.1 F | BODY MASS INDEX: 22.36 KG/M2 | HEART RATE: 62 BPM

## 2019-04-29 DIAGNOSIS — R31.0 GROSS HEMATURIA: Primary | ICD-10-CM

## 2019-04-29 LAB
ALBUMIN UR-MCNC: NEGATIVE MG/DL
APPEARANCE UR: CLEAR
BILIRUB UR QL STRIP: NEGATIVE
COLOR UR AUTO: YELLOW
GLUCOSE UR STRIP-MCNC: NEGATIVE MG/DL
HGB UR QL STRIP: NEGATIVE
KETONES UR STRIP-MCNC: NEGATIVE MG/DL
LEUKOCYTE ESTERASE UR QL STRIP: NEGATIVE
NITRATE UR QL: NEGATIVE
PH UR STRIP: 7 PH (ref 5–7)
SOURCE: NORMAL
SP GR UR STRIP: 1.01 (ref 1–1.03)
UROBILINOGEN UR STRIP-ACNC: 0.2 EU/DL (ref 0.2–1)

## 2019-04-29 PROCEDURE — 99213 OFFICE O/P EST LOW 20 MIN: CPT | Performed by: FAMILY MEDICINE

## 2019-04-29 PROCEDURE — 81003 URINALYSIS AUTO W/O SCOPE: CPT | Performed by: FAMILY MEDICINE

## 2019-04-29 ASSESSMENT — MIFFLIN-ST. JEOR: SCORE: 1285.34

## 2019-06-05 ENCOUNTER — TRANSFERRED RECORDS (OUTPATIENT)
Dept: HEALTH INFORMATION MANAGEMENT | Facility: CLINIC | Age: 69
End: 2019-06-05

## 2019-06-24 ENCOUNTER — OFFICE VISIT (OUTPATIENT)
Dept: FAMILY MEDICINE | Facility: CLINIC | Age: 69
End: 2019-06-24
Payer: COMMERCIAL

## 2019-06-24 VITALS
OXYGEN SATURATION: 97 % | SYSTOLIC BLOOD PRESSURE: 148 MMHG | TEMPERATURE: 98 F | HEART RATE: 65 BPM | HEIGHT: 69 IN | BODY MASS INDEX: 21.62 KG/M2 | WEIGHT: 146 LBS | DIASTOLIC BLOOD PRESSURE: 82 MMHG

## 2019-06-24 DIAGNOSIS — R31.9 HEMATURIA, UNSPECIFIED TYPE: Primary | ICD-10-CM

## 2019-06-24 LAB
ALBUMIN UR-MCNC: NEGATIVE MG/DL
APPEARANCE UR: CLEAR
BILIRUB UR QL STRIP: NEGATIVE
COLOR UR AUTO: YELLOW
GLUCOSE UR STRIP-MCNC: NEGATIVE MG/DL
HGB UR QL STRIP: ABNORMAL
KETONES UR STRIP-MCNC: NEGATIVE MG/DL
LEUKOCYTE ESTERASE UR QL STRIP: NEGATIVE
NITRATE UR QL: NEGATIVE
NON-SQ EPI CELLS #/AREA URNS LPF: NORMAL /LPF
PH UR STRIP: 6.5 PH (ref 5–7)
RBC #/AREA URNS AUTO: NORMAL /HPF
SOURCE: ABNORMAL
SP GR UR STRIP: 1.01 (ref 1–1.03)
UROBILINOGEN UR STRIP-ACNC: 0.2 EU/DL (ref 0.2–1)
WBC #/AREA URNS AUTO: NORMAL /HPF

## 2019-06-24 PROCEDURE — 99214 OFFICE O/P EST MOD 30 MIN: CPT | Performed by: NURSE PRACTITIONER

## 2019-06-24 PROCEDURE — 81001 URINALYSIS AUTO W/SCOPE: CPT | Performed by: NURSE PRACTITIONER

## 2019-06-24 ASSESSMENT — MIFFLIN-ST. JEOR: SCORE: 1266.63

## 2019-06-24 NOTE — PROGRESS NOTES
"Subjective     Florencia Ross is a 68 year old female who presents to clinic today for the following health issues:    HPI   URINARY TRACT SYMPTOMS      Duration: Occurred last week, and yesterday    Description  frequency, hematuria and back pain, and had an episode of burning last week as well     Intensity: was painful yesterday, no pain today    Accompanying signs and symptoms:  Fever/chills: YES- yesterday pt states they felt chilled   Flank pain YES- R sided lower back pain   Nausea and vomiting: no   Vaginal symptoms: none  Abdominal/Pelvic Pain: no     History  History of frequent UTI's: no but has had them  History of kidney stones: no   Sexually Active: YES  Possibility of pregnancy: No    Precipitating or alleviating factors: ibuprofen yesterday and it seemed to help per patient     Therapies tried and outcome: ibuprofen   Outcome: helped per patient     This same thing occurred 2 months ago.    Past Medical History:   Diagnosis Date     Heart murmur      Hypertension      Current Outpatient Medications   Medication     acyclovir (ZOVIRAX) 400 MG tablet     aspirin 81 MG EC tablet     Calcium Carbonate Antacid (TUMS PO)     Cholecalciferol (VITAMIN D-3) 1000 UNITS CAPS     Multiple Vitamins-Calcium (ONE-A-DAY WOMENS PO)     triamterene-HCTZ (MAXZIDE-25) 37.5-25 MG tablet     No current facility-administered medications for this visit.         Allergies   Allergen Reactions     Corn Oil Anaphylaxis     Food Hives     Raspberries, asparagus     Dye [Contrast Dye]      Iodine      Other reaction(s): Other, see comments  Stops breathing.       Review of Systems   ROS COMP: Constitutional, HEENT, cardiovascular, pulmonary, GI, , musculoskeletal, neuro, skin, endocrine and psych systems are negative, except as otherwise noted.      Objective    /82   Pulse 65   Temp 98  F (36.7  C) (Oral)   Ht 1.753 m (5' 9\")   Wt 66.2 kg (146 lb)   SpO2 97%   BMI 21.56 kg/m      Physical Exam   GENERAL: alert and no " distress  NECK: no adenopathy, no asymmetry, masses, or scars and thyroid normal to palpation  RESP: lungs clear to auscultation - no rales, rhonchi or wheezes  CV: regular rate and rhythm, normal S1 S2, no S3 or S4, no murmur, click or rub, no peripheral edema and peripheral pulses strong  ABDOMEN: soft, nontender, no hepatosplenomegaly, no masses and bowel sounds normal  MS: no gross musculoskeletal defects noted, no edema  SKIN: no suspicious lesions or rashes  NEURO: Normal strength and tone, mentation intact and speech normal  PSYCH: mentation appears normal, affect normal/bright    Diagnostic Test Results:  Results for orders placed or performed in visit on 06/24/19 (from the past 24 hour(s))   *UA reflex to Microscopic and Culture ([place] [facility] (except [facility] and [place])   Result Value Ref Range    Color Urine Yellow     Appearance Urine Clear     Glucose Urine Negative NEG^Negative mg/dL    Bilirubin Urine Negative NEG^Negative    Ketones Urine Negative NEG^Negative mg/dL    Specific Gravity Urine 1.015 1.003 - 1.035    Blood Urine Trace (A) NEG^Negative    pH Urine 6.5 5.0 - 7.0 pH    Protein Albumin Urine Negative NEG^Negative mg/dL    Urobilinogen Urine 0.2 0.2 - 1.0 EU/dL    Nitrite Urine Negative NEG^Negative    Leukocyte Esterase Urine Negative NEG^Negative    Source Midstream Urine    Urine Microscopic   Result Value Ref Range    WBC Urine 0 - 5 OTO5^0 - 5 /HPF    RBC Urine O - 2 OTO2^O - 2 /HPF    Squamous Epithelial /LPF Urine Few FEW^Few /LPF           Assessment & Plan     1. Hematuria  Discussed possibly passed kidney stone, UA does not show UTI and I have no concern for pyelonephritis in clinic today (no nausea vomiting fever chills flank pain urinary pain or much blood in urine all seems to have subsided)  She is questioning why this has happened twice in the last 2 months. I have put in urology referral.     Emergent symptoms as reviewed to ER        Angela Cartwright, APRN  [facility]

## 2019-06-24 NOTE — PATIENT INSTRUCTIONS
Patient Education     Hematuria: Possible Causes     Many things can lead to blood in the urine (hematuria). The blood may be seen with the eye (macroscopic or gross hematuria). Or it may only be seen when the urine is looked at under a microscope (microscopic hematuria). Some of the most common causes of blood in the urine are listed below. Often, no cause for the blood can be found. This is called idiopathic hematuria.    Kidney or bladder stones are collections of crystals. They form in the urine. Stones may be found anywhere in the urinary tract. But they form most often in the kidneys or bladder. In addition to blood in the urine, they can cause severe pain.    BPH stands for benign prostatic hyperplasia. It is enlargement of the prostate gland. It happens as men age. BPH often causes problems with urination. It sometimes causes blood in the urine.    A urinary tract infection (UTI) is due to bacteria growing in the urinary tract. It can cause blood in the urine. Other symptoms include burning or pain with urination. You may need to urinate often or urgently. You may also have a fever.    Damage to the urinary tract may cause blood in the urine. This damage may be due to a blow or accident. It may also result from the use of a urinary catheter. Very hard exercise may sometimes irritate the urinary tract and cause bleeding.    Cancer may occur anywhere in the urinary tract. A tumor may sometimes cause no symptoms other than bleeding.     Other possible causes of bleeding include:    Prostatitis (infection of the prostate gland)    Taking anticoagulants    Blockage in the urinary tract    Disease or inflammation of the kidney    Cystic diseases of the kidneys    Sickle cell anemia    Vigorous exercise    Endometriosis  Date Last Reviewed: 12/1/2016 2000-2018 [employer]. [address] [place] [address], [place] [address]. All rights reserved. This information is not intended as a substitute for  professional medical care. Always follow your healthcare professional's instructions.

## 2019-06-28 ENCOUNTER — OFFICE VISIT (OUTPATIENT)
Dept: UROLOGY | Facility: CLINIC | Age: 69
End: 2019-06-28
Attending: NURSE PRACTITIONER
Payer: COMMERCIAL

## 2019-06-28 ENCOUNTER — ANCILLARY PROCEDURE (OUTPATIENT)
Dept: CT IMAGING | Facility: CLINIC | Age: 69
End: 2019-06-28
Attending: UROLOGY
Payer: COMMERCIAL

## 2019-06-28 VITALS
DIASTOLIC BLOOD PRESSURE: 79 MMHG | HEIGHT: 69 IN | BODY MASS INDEX: 22.19 KG/M2 | SYSTOLIC BLOOD PRESSURE: 153 MMHG | WEIGHT: 149.8 LBS | OXYGEN SATURATION: 98 % | HEART RATE: 65 BPM

## 2019-06-28 DIAGNOSIS — R31.0 GROSS HEMATURIA: ICD-10-CM

## 2019-06-28 DIAGNOSIS — R31.0 GROSS HEMATURIA: Primary | ICD-10-CM

## 2019-06-28 LAB
ALBUMIN UR-MCNC: NEGATIVE MG/DL
APPEARANCE UR: CLEAR
BILIRUB UR QL STRIP: NEGATIVE
COLOR UR AUTO: ABNORMAL
COPATH REPORT: NORMAL
GLUCOSE UR STRIP-MCNC: NEGATIVE MG/DL
HGB UR QL STRIP: NEGATIVE
KETONES UR STRIP-MCNC: NEGATIVE MG/DL
LEUKOCYTE ESTERASE UR QL STRIP: NEGATIVE
NITRATE UR QL: NEGATIVE
PH UR STRIP: 8 PH (ref 5–7)
SOURCE: ABNORMAL
SP GR UR STRIP: 1.01 (ref 1–1.03)
UROBILINOGEN UR STRIP-MCNC: NORMAL MG/DL (ref 0–2)

## 2019-06-28 PROCEDURE — 99204 OFFICE O/P NEW MOD 45 MIN: CPT | Performed by: UROLOGY

## 2019-06-28 PROCEDURE — 88112 CYTOPATH CELL ENHANCE TECH: CPT | Performed by: UROLOGY

## 2019-06-28 PROCEDURE — 74176 CT ABD & PELVIS W/O CONTRAST: CPT | Performed by: RADIOLOGY

## 2019-06-28 PROCEDURE — 81003 URINALYSIS AUTO W/O SCOPE: CPT | Performed by: UROLOGY

## 2019-06-28 ASSESSMENT — MIFFLIN-ST. JEOR: SCORE: 1283.87

## 2019-06-28 NOTE — PROGRESS NOTES
Urology Consult History and Physical  CHRISTAL VARGAS   Name: Florencia Ross    MRN: [id number]   YOB: 1950       We were asked to see Florencia Ross at the request of Angela Cartwright CNP for evaluation and treatment of gross hematuria.        Chief Complaint:   Gross hematuria    History is obtained from the patient            History of Present Illness:   Florencia Ross is a 68 year old female who is being seen for evaluation of gross hematuria.  She reports that her symptoms started in April.  She has had 4 episodes of gross hematuria with associated burning with urination.  One episode, which occurred last , was associated with right lower back pain.  In between these episodes she is voiding normally.  No prior history of this.  No history of kidney stones, smoking, or other  malignancy.  She is otherwise healthy with hypertension.      U/A  19   - negative micro  19   - >100 RBC, UCx neg    No family history of  malignancy or stones  She had a sister who passed away from metastatic breast cancer    (4 or >)  Location: Urine  Quality: gross hematuria   Severity: gross hematuria   Duration: 2019           Past Medical History:     Past Medical History:   Diagnosis Date     Heart murmur      Hypertension             Past Surgical History:     Past Surgical History:   Procedure Laterality Date     COLONOSCOPY       COLONOSCOPY  2019    4 polyps     COLONOSCOPY N/A 3/14/2019    Procedure: Combined Colonoscopy, Single Or Multiple Biopsy/Polypectomy By Biopsy;  Surgeon: Arun Ruano MD;  Location: [facility]     COLONOSCOPY WITH CO2 INSUFFLATION N/A 3/14/2019    Procedure: COLONOSCOPY WITH CO2 INSUFFLATION;  Surgeon: Arun Ruano MD;  Location: [facility]     GYN SURGERY      c section            Social History:     Social History     Tobacco Use     Smoking status: Former Smoker     Last attempt to quit: 1977     Years since quittin.9     Smokeless  "tobacco: Never Used   Substance Use Topics     Alcohol use: Yes     Comment: 5-7 drinks a week       History   Smoking Status     Former Smoker     Quit date: 7/11/1977   Smokeless Tobacco     Never Used            Family History:     Family History   Problem Relation Age of Onset     Cerebrovascular Disease Mother      Glaucoma Mother 80        old age      Hypertension Father      Macular Degeneration No family hx of               Allergies:     Allergies   Allergen Reactions     Corn Oil Anaphylaxis     Food Hives     Raspberries, asparagus     Dye [Contrast Dye]      Iodine      Other reaction(s): Other, see comments  Stops breathing.            Medications:     Current Outpatient Medications   Medication Sig     acyclovir (ZOVIRAX) 400 MG tablet Take 1 tablet (400 mg) by mouth 2 times daily     aspirin 81 MG EC tablet Take 1 tablet by mouth daily. (Patient taking differently: Take 81 mg by mouth As needed)     Calcium Carbonate Antacid (TUMS PO) As needed     Cholecalciferol (VITAMIN D-3) 1000 UNITS CAPS 1,000 Units     Multiple Vitamins-Calcium (ONE-A-DAY WOMENS PO) Take  by mouth.     triamterene-HCTZ (MAXZIDE-25) 37.5-25 MG tablet Take 1 tablet by mouth daily     No current facility-administered medications for this visit.              Review of Systems:     Skin: negative  Eyes: negative  Ears/Nose/Throat: negative  Respiratory: No shortness of breath, dyspnea on exertion, cough, or hemoptysis  Cardiovascular: negative  Gastrointestinal: negative  Genitourinary: as above  Musculoskeletal: negative  Neurologic: negative  Psychiatric: negative  Hematologic/Lymphatic/Immunologic: negative  Endocrine: negative          Physical Exam:     Patient Vitals for the past 24 hrs:   BP Pulse SpO2 Height Weight   06/28/19 0857 153/79 65 98 % 1.753 m (5' 9\") 67.9 kg (149 lb 12.8 oz)     Body mass index is 22.12 kg/m .     General: age-appropriate appearing female in NAD  HEENT: Head AT/NC, EOMI, CN Grossly intact  Lungs: " no respiratory distress, or pursed lip breathing  Heart: No obvious jugular venous distension present  Back: no bony midline tenderness, no CVAT bilaterally.  Abdomen: soft, non-distended, non-tender. No organomegaly  : No costovertebral tenderness bilaterally   Lymph: no palpable inguinal lymphadenopathy.  LE: no edema.   Musculoskeltal: extremities normal, no peripheral edema  Skin: no suspicious lesions or rashes  Neuro: Alert, oriented, speech and mentation normal;  moving all 4 extremities equally.  Psych: affect and mood normal          Data:   All laboratory data reviewed:    UA RESULTS:  Recent Labs   Lab Test 06/24/19  1440   COLOR Yellow   APPEARANCE Clear   URINEGLC Negative   URINEBILI Negative   URINEKETONE Negative   SG 1.015   UBLD Trace*   URINEPH 6.5   PROTEIN Negative   UROBILINOGEN 0.2   NITRITE Negative   LEUKEST Negative   RBCU O - 2   WBCU 0 - 5       Lab Results   Component Value Date    CR 0.75 12/03/2018             Impression and Plan:   Impression:   68 year old female with intermittent gross hematuria      Plan:   Gross hematuria   -We discussed the rationale for hematuria work-up.  We discussed that this involves a urine cytology, CT scan and a cystoscopy.  She has a significant iodine allergy, and as such we cannot obtain a contrast phase of the CT scan.  Pending the rest of the work-up, she may require retrograde pyelograms if her urine cytology is positive and cystoscopy is negative.  -Given that the symptoms may be from a stone, as last episode was associated with some right flank pain, I will call her after the CT scan is complete.    HTN  - 153/79 today, though she reports being nervous for our appointment  - she follows with Dr. Cortez for this     Thank you for the kind consultation.    Time spent: 30 minutes of which >50% was spent counseling.    Ramiro Espitia MD   Urology  [facility]  [facility] Phone: [phone]  [facility]  [facility] Phone: [phone]

## 2019-06-28 NOTE — Clinical Note
Hi Dr. Cortez and MsPastora Cartwright,I saw Florencia today in consultation for her intermittent gross hematuria.  She is currently doing well with no other associated symptoms.  She did have some right-sided flank pain with her most recent episode last week.  Plan to obtain a urine cytology, CT scan, and have her return for cystoscopy.  She has an iodine allergy, and as such the CT scan will not be able to include contrast.  I will keep you updated with the results and my subsequent treatment plan.Please let me know if you have any questions or concerns.Thanks, Ramiro Espitia M.D.Cell: [phone]

## 2019-06-28 NOTE — NURSING NOTE
"Florencia Ross's goals for this visit include:   Chief Complaint   Patient presents with     Consult     Hematuria       She requests these members of her care team be copied on today's visit information: yes    PCP: Shanel Cortez    Referring Provider:  TEREZA Mena CNP  [address]  [place] [address]    /79   Pulse 65   Ht 1.753 m (5' 9\")   Wt 67.9 kg (149 lb 12.8 oz)   SpO2 98%   BMI 22.12 kg/m      Do you need any medication refills at today's visit? No    Willow Chan CMA      "

## 2019-07-01 ENCOUNTER — TELEPHONE (OUTPATIENT)
Dept: UROLOGY | Facility: CLINIC | Age: 69
End: 2019-07-01

## 2019-07-01 DIAGNOSIS — N20.1 RIGHT URETERAL STONE: Primary | ICD-10-CM

## 2019-07-01 RX ORDER — TAMSULOSIN HYDROCHLORIDE 0.4 MG/1
0.4 CAPSULE ORAL DAILY
Qty: 30 CAPSULE | Refills: 0 | Status: SHIPPED | OUTPATIENT
Start: 2019-07-01 | End: 2019-07-23

## 2019-07-01 NOTE — TELEPHONE ENCOUNTER
Florencia Ross  [id number]  July 1, 2019  9:39 AM    I called and discussed with the patient regarding her recent CT scan.  This demonstrated a 6.6 x 8.1 mm right ureteral stone in her distal ureter.  There is no associated hydronephrosis.  She reports that she has been doing well with only very mild right flank pain.  She is currently up at her cabin, and hopes to be up there for the next several weeks.  We discussed that given the long duration of her symptoms, since April, that it would be wise to proceed with ureteroscopy and laser lithotripsy for stone removal.  She will look to schedule this for later in July.  We discussed the use of tamsulosin to promote kidney stone passage and relax the ureter, and this prescription was sent to her pharmacy.  I advised her to watch for concerning symptoms of worsening flank pain, or development of a fever or chills which could be a sign of a UTI.    Findings:  Normal noncontrast appearance of the bilateral kidneys.  Mild size asymmetry, left greater than right. No hydronephrosis. There  is a 6 mm stone along the expected course of the right mid ureter. No  left-sided nephrolithiasis. Urinary bladder is unremarkable. Numerous  pelvic phleboliths.      Normal noncontrast appearance of the liver. Gallbladder is  unremarkable without radiodense stones. The spleen, adrenal glands,  and pancreas are normal.  No bowel dilatation. Surgical clips along  the right colon. Moderate colonic stool burden. Appendix is normal. No  abnormal free peritoneal fluid or air. No intraperitoneal or  retroperitoneal lymphadenopathy.      Lower thorax: Unremarkable.     Bones and soft tissues: The multilevel degenerative changes and facet  arthropathy of the thoracolumbar spine. Mild retrolisthesis of L2 on  L3. Soft tissue is unremarkable.                                                                      Impression:   1. 6 mm stone along the expected course of the right mid ureter,  presumably  intraureteral given hematuria and right flank pain. Patient  was unable to get intravenous contrast due to allergy history.  2. Moderate colonic stool burden.            Ramiro Espitia M.D.

## 2019-07-02 NOTE — TELEPHONE ENCOUNTER
Date Scheduled: 7-25-19  Facility: [facility]  Surgeon: Dr. Espitia   Post-op appointment scheduled:   Next 5 appointments (look out 90 days)    Jul 23, 2019  7:40 AM CDT  Pre-Op physical with Shanel Cortez MD  [facility] [address] [facility] [address] [phone]           scheduled?: No  Surgery packet/instructions confirmed received?  Yes, mailed  Special Considerations:   Bette Fierro, Surgery Scheduling Coordinator

## 2019-07-15 ENCOUNTER — MYC MEDICAL ADVICE (OUTPATIENT)
Dept: UROLOGY | Facility: CLINIC | Age: 69
End: 2019-07-15

## 2019-07-16 NOTE — PROGRESS NOTES
[facility]  [address] [facility] [address]  [phone]  Dept: [phone]    PRE-OP EVALUATION:  Today's date: 2019    Florencia Ross (: 1950) presents for pre-operative evaluation assessment as requested by Dr. Espitia.  She requires evaluation and anesthesia risk assessment prior to undergoing surgery/procedure for treatment of kidney stone .    Proposed Surgery/ Procedure: combined cystoscopy and ureteroscopy , laser holmium lithotripsy  ureters   Date of Surgery/ Procedure: 19  Time of Surgery/ Procedure: 1:30   Hospital/Surgical Facility: [facility]  Fax number for surgical facility:   Primary Physician: Shanel Cortez  Type of Anesthesia Anticipated: to be determined    Patient has a Health Care Directive or Living Will:  YES     1. NO - Do you have a history of heart attack, stroke, stent, bypass or surgery on an artery in the head, neck, heart or legs?  2. NO - Do you ever have any pain or discomfort in your chest?  3. NO - Do you have a history of  Heart Failure?  4. NO - Are you troubled by shortness of breath when: walking on the level, up a slight hill or at night?  5. YES - DO YOU CURRENTLY HAVE A COLD, BRONCHITIS OR OTHER RESPIRATORY INFECTION? Slight cold with sore throat and slight congestion  6. NO - Do you have a cough, shortness of breath or wheezing?  7. NO - Do you sometimes get pains in the calves of your legs when you walk?  8. NO - Do you or anyone in your family have previous history of blood clots?  9. NO - Do you or does anyone in your family have a serious bleeding problem such as prolonged bleeding following surgeries or cuts?  10. NO - Have you ever had problems with anemia or been told to take iron pills?  11. NO - Have you had any abnormal blood loss such as black, tarry or bloody stools, or abnormal vaginal bleeding?  12. NO - Have you ever had a blood transfusion?  13. YES - HAVE YOU OR ANY OF YOUR RELATIVES EVER HAD  PROBLEMS WITH ANESTHESIA? Takes a while to come out of anesthesia  14. NO - Do you have sleep apnea, excessive snoring or daytime drowsiness?  15. NO - Do you have any prosthetic heart valves?  16. NO - Do you have prosthetic joints?  17. NO - Is there any chance that you may be pregnant?      HPI:     HPI related to upcoming procedure: pt to undergo combined cystoscopy, ureteroscopy and lithotripsy for renal stones      HYPERTENSION - Patient has longstanding history of HTN , currently denies any symptoms referable to elevated blood pressure. Specifically denies chest pain, palpitations, dyspnea, orthopnea, PND or peripheral edema. Blood pressure readings have been in normal range. Current medication regimen is as listed below. Patient denies any side effects of medication.       MEDICAL HISTORY:     Patient Active Problem List    Diagnosis Date Noted     Advanced directives, counseling/discussion 05/17/2018     Priority: Medium     Pt will bring in a copy. Garrett Tucker MA       Dysplastic nevus of right lower back s/p excision 7-23-15 07/23/2015     Priority: Medium     Angioma of skin 08/27/2013     Priority: Medium     Photoaging of skin 08/27/2013     Priority: Medium     Wrinkling of orbital skin 05/14/2012     Priority: Medium     Lentigo 05/14/2012     Priority: Medium     (Problem list name updated by automated process. Provider to review and confirm.)       KP (keratosis pilaris) 05/14/2012     Priority: Medium     Osteopenia 07/29/2011     Priority: Medium     Hypertension goal BP (blood pressure) < 140/90 07/11/2011     Priority: Medium     CARDIOVASCULAR SCREENING; LDL GOAL LESS THAN 130 07/11/2011     Priority: Medium     Recurrent genital herpes 07/11/2011     Priority: Medium      Past Medical History:   Diagnosis Date     Heart murmur      Hypertension      Past Surgical History:   Procedure Laterality Date     COLONOSCOPY       COLONOSCOPY  03/14/2019    4 polyps     COLONOSCOPY N/A 3/14/2019     "Procedure: Combined Colonoscopy, Single Or Multiple Biopsy/Polypectomy By Biopsy;  Surgeon: Arun Ruano MD;  Location: [facility]     COLONOSCOPY WITH CO2 INSUFFLATION N/A 3/14/2019    Procedure: COLONOSCOPY WITH CO2 INSUFFLATION;  Surgeon: Arun Ruano MD;  Location: [facility] OR     GYN SURGERY      c section     Current Outpatient Medications   Medication Sig Dispense Refill     acyclovir (ZOVIRAX) 400 MG tablet Take 1 tablet (400 mg) by mouth 2 times daily 180 tablet 1     Calcium Carbonate Antacid (TUMS PO) As needed       Cholecalciferol (VITAMIN D-3) 1000 UNITS CAPS 1,000 Units       Multiple Vitamins-Calcium (ONE-A-DAY WOMENS PO) Take  by mouth.       tamsulosin (FLOMAX) 0.4 MG capsule Take 1 capsule (0.4 mg) by mouth daily Take daily until your stone or stent is removed. 30 capsule 0     triamterene-HCTZ (MAXZIDE-25) 37.5-25 MG tablet Take 1 tablet by mouth daily 90 tablet 1     aspirin 81 MG EC tablet Take 1 tablet by mouth daily. (Patient not taking: Reported on 2019) 90 tablet 3     OTC products: None, except as noted above    Allergies   Allergen Reactions     Corn Oil Anaphylaxis     Food Hives     Raspberries, asparagus     Dye [Contrast Dye]      Iodine      Other reaction(s): Other, see comments  Stops breathing.      Latex Allergy: NO    Social History     Tobacco Use     Smoking status: Former Smoker     Last attempt to quit: 1977     Years since quittin.0     Smokeless tobacco: Never Used   Substance Use Topics     Alcohol use: Yes     Comment: 5-7 drinks a week     History   Drug Use No       REVIEW OF SYSTEMS:   Constitutional, neuro, ENT, endocrine, pulmonary, cardiac, gastrointestinal, genitourinary, musculoskeletal, integument and psychiatric systems are negative, except as otherwise noted.    EXAM:   /69   Pulse 77   Temp 97.7  F (36.5  C) (Oral)   Resp 16   Ht 1.753 m (5' 9\")   Wt 66.7 kg (147 lb)   SpO2 98%   BMI 21.71 kg/m      GENERAL " APPEARANCE: healthy, alert and no distress     EYES: EOMI, PERRL     HENT: ear canals and TM's normal and nose and mouth without ulcers or lesions     NECK: no adenopathy, no asymmetry, masses, or scars and thyroid normal to palpation     RESP: lungs clear to auscultation - no rales, rhonchi or wheezes     CV: regular rates and rhythm, normal S1 S2, no S3 or S4 and no murmur, click or rub     ABDOMEN:  soft, nontender, no HSM or masses and bowel sounds normal     MS: extremities normal- no gross deformities noted, no evidence of inflammation in joints, FROM in all extremities.     PSYCH: mentation appears normal. and affect normal/bright    DIAGNOSTICS:   EKG: appears normal, NSR, 1st degree AV block, normal axis, normal intervals, no acute ST/T changes c/w ischemia, no LVH by voltage criteria, unchanged from previous tracings    BMP pending    IMPRESSION:   Reason for surgery/procedure: renal stone treatment with lithotripsy.cystoscopy/ereteroscopy    The proposed surgical procedure is considered LOW risk.    REVISED CARDIAC RISK INDEX  The patient has the following serious cardiovascular risks for perioperative complications such as (MI, PE, VFib and 3  AV Block):  No serious cardiac risks  INTERPRETATION: 0 risks: Class I (very low risk - 0.4% complication rate)    The patient has the following additional risks for perioperative complications:  No identified additional risks      ICD-10-CM    1. Preop general physical exam Z01.818 EKG 12-lead complete w/read - Clinics     Basic metabolic panel   2. Calculus of kidney N20.0    3. Hypertension goal BP (blood pressure) < 140/90 I10        RECOMMENDATIONS:         --Patient is to take all scheduled medications on the day of surgery EXCEPT for modifications listed below.    Anticoagulant or Antiplatelet Medication Use  ASPIRIN: Discontinue ASA 7-10 days prior to procedure to reduce bleeding risk.  It should be resumed post-operatively.        APPROVAL GIVEN to proceed  with proposed procedure, without further diagnostic evaluation       Signed Electronically by: Shanel Hartman MD    Copy of this evaluation report is provided to requesting physician.    [facility] Preop Guidelines    Revised Cardiac Risk Index

## 2019-07-17 NOTE — TELEPHONE ENCOUNTER
[facility] Call Center    Phone Message    May a detailed message be left on voicemail: yes    Reason for Call: Other: Pt is requesting a follow up call regarding her MyChart message and pre-op questions for surgery on 7/25. Please email her instructions if able or call her to discuss. Please advise.     Action Taken: Message routed to:  Adult Clinics: Urology p [id number]

## 2019-07-19 NOTE — TELEPHONE ENCOUNTER
Message responded via [employer]. Repeat encounter. Information mailed and sent via [employer]. Shelby Porras RN

## 2019-07-23 ENCOUNTER — OFFICE VISIT (OUTPATIENT)
Dept: FAMILY MEDICINE | Facility: CLINIC | Age: 69
End: 2019-07-23
Payer: COMMERCIAL

## 2019-07-23 ENCOUNTER — TELEPHONE (OUTPATIENT)
Dept: FAMILY MEDICINE | Facility: CLINIC | Age: 69
End: 2019-07-23

## 2019-07-23 VITALS
HEIGHT: 69 IN | OXYGEN SATURATION: 98 % | WEIGHT: 147 LBS | BODY MASS INDEX: 21.77 KG/M2 | HEART RATE: 77 BPM | RESPIRATION RATE: 16 BRPM | TEMPERATURE: 97.7 F | DIASTOLIC BLOOD PRESSURE: 69 MMHG | SYSTOLIC BLOOD PRESSURE: 119 MMHG

## 2019-07-23 DIAGNOSIS — I10 HYPERTENSION GOAL BP (BLOOD PRESSURE) < 140/90: ICD-10-CM

## 2019-07-23 DIAGNOSIS — N20.1 RIGHT URETERAL STONE: ICD-10-CM

## 2019-07-23 DIAGNOSIS — Z01.818 PREOP GENERAL PHYSICAL EXAM: Primary | ICD-10-CM

## 2019-07-23 DIAGNOSIS — N20.0 CALCULUS OF KIDNEY: ICD-10-CM

## 2019-07-23 LAB
ANION GAP SERPL CALCULATED.3IONS-SCNC: 5 MMOL/L (ref 3–14)
BUN SERPL-MCNC: 11 MG/DL (ref 7–30)
CALCIUM SERPL-MCNC: 9.1 MG/DL (ref 8.5–10.1)
CHLORIDE SERPL-SCNC: 101 MMOL/L (ref 94–109)
CO2 SERPL-SCNC: 32 MMOL/L (ref 20–32)
CREAT SERPL-MCNC: 0.65 MG/DL (ref 0.52–1.04)
GFR SERPL CREATININE-BSD FRML MDRD: >90 ML/MIN/{1.73_M2}
GLUCOSE SERPL-MCNC: 63 MG/DL (ref 70–99)
POTASSIUM SERPL-SCNC: 3.2 MMOL/L (ref 3.4–5.3)
SODIUM SERPL-SCNC: 138 MMOL/L (ref 133–144)

## 2019-07-23 PROCEDURE — 80048 BASIC METABOLIC PNL TOTAL CA: CPT | Performed by: FAMILY MEDICINE

## 2019-07-23 PROCEDURE — 36415 COLL VENOUS BLD VENIPUNCTURE: CPT | Performed by: FAMILY MEDICINE

## 2019-07-23 PROCEDURE — 99214 OFFICE O/P EST MOD 30 MIN: CPT | Mod: 25 | Performed by: FAMILY MEDICINE

## 2019-07-23 PROCEDURE — 93000 ELECTROCARDIOGRAM COMPLETE: CPT | Performed by: FAMILY MEDICINE

## 2019-07-23 RX ORDER — TAMSULOSIN HYDROCHLORIDE 0.4 MG/1
0.4 CAPSULE ORAL DAILY
Qty: 30 CAPSULE | Refills: 0 | Status: SHIPPED | OUTPATIENT
Start: 2019-07-23 | End: 2019-11-17

## 2019-07-23 ASSESSMENT — MIFFLIN-ST. JEOR: SCORE: 1271.17

## 2019-07-23 NOTE — TELEPHONE ENCOUNTER
Patient called back.     Called patient and gave providers message/ instructions.  Patient states (s)he understands this.     Arielle MELON, RN

## 2019-07-23 NOTE — TELEPHONE ENCOUNTER
Medication request: tamsulosin 0.4 mg  Sig: take one pill daily until stone is passed or stent is removed  Last filled: 7/1/19  Last Qty: 30  Pt's last office visit: 6/28/19  Next scheduled office visit: 8/1/19    Message sent to provider for review and approval due to the short nature of treatment anticipated. Shelby Porras RN

## 2019-07-23 NOTE — PROGRESS NOTES
No need to fax the Pre Op because the patients procedure is at the [facility].  Jacque Perez,

## 2019-07-23 NOTE — TELEPHONE ENCOUNTER
Shanel Cortez MD  P Cristel Mckinley             Pts potassium is just a bit low   Have her increase her intake of bananas, tomatoes, potatoes and other potassium rich foods today and tomorrow.   They may want to recheck it before surgery.     Shanel Cortez MD

## 2019-07-23 NOTE — TELEPHONE ENCOUNTER
Left message on voicemail for patient to call back.     Direct number given: [phone].  Arielle Matute BSN, RN

## 2019-07-24 ENCOUNTER — ANESTHESIA EVENT (OUTPATIENT)
Dept: SURGERY | Facility: AMBULATORY SURGERY CENTER | Age: 69
End: 2019-07-24

## 2019-07-25 ENCOUNTER — ANESTHESIA (OUTPATIENT)
Dept: SURGERY | Facility: AMBULATORY SURGERY CENTER | Age: 69
End: 2019-07-25

## 2019-08-01 ENCOUNTER — ANCILLARY PROCEDURE (OUTPATIENT)
Dept: GENERAL RADIOLOGY | Facility: CLINIC | Age: 69
End: 2019-08-01
Attending: UROLOGY
Payer: COMMERCIAL

## 2019-08-01 ENCOUNTER — HOSPITAL ENCOUNTER (OUTPATIENT)
Facility: AMBULATORY SURGERY CENTER | Age: 69
Discharge: HOME | End: 2019-08-01
Attending: UROLOGY | Admitting: UROLOGY
Payer: COMMERCIAL

## 2019-08-01 VITALS
OXYGEN SATURATION: 94 % | HEART RATE: 64 BPM | SYSTOLIC BLOOD PRESSURE: 158 MMHG | RESPIRATION RATE: 16 BRPM | TEMPERATURE: 96.8 F | DIASTOLIC BLOOD PRESSURE: 94 MMHG

## 2019-08-01 DIAGNOSIS — N20.1 RIGHT URETERAL STONE: Primary | ICD-10-CM

## 2019-08-01 DIAGNOSIS — D30.11: ICD-10-CM

## 2019-08-01 DIAGNOSIS — N20.0 RENAL CALCULI: ICD-10-CM

## 2019-08-01 PROCEDURE — 52354 CYSTOURETERO W/BIOPSY: CPT | Mod: RT

## 2019-08-01 PROCEDURE — G8916 PT W IV AB GIVEN ON TIME: HCPCS

## 2019-08-01 PROCEDURE — 88305 TISSUE EXAM BY PATHOLOGIST: CPT | Performed by: UROLOGY

## 2019-08-01 PROCEDURE — 52332 CYSTOSCOPY AND TREATMENT: CPT | Mod: RT

## 2019-08-01 PROCEDURE — G8907 PT DOC NO EVENTS ON DISCHARG: HCPCS

## 2019-08-01 DEVICE — STENT URETERAL PERCUFLEX PLUS 6FRX24CM M0061752620: Type: IMPLANTABLE DEVICE | Site: URETER | Status: FUNCTIONAL

## 2019-08-01 RX ORDER — DEXAMETHASONE SODIUM PHOSPHATE 4 MG/ML
4 INJECTION, SOLUTION INTRA-ARTICULAR; INTRALESIONAL; INTRAMUSCULAR; INTRAVENOUS; SOFT TISSUE EVERY 10 MIN PRN
Status: DISCONTINUED | OUTPATIENT
Start: 2019-08-01 | End: 2019-08-02 | Stop reason: HOSPADM

## 2019-08-01 RX ORDER — KETOROLAC TROMETHAMINE 10 MG/1
10 TABLET, FILM COATED ORAL EVERY 6 HOURS
Qty: 20 TABLET | Refills: 0 | Status: SHIPPED | OUTPATIENT
Start: 2019-08-01 | End: 2019-08-06

## 2019-08-01 RX ORDER — HYDROMORPHONE HYDROCHLORIDE 1 MG/ML
.3-.5 INJECTION, SOLUTION INTRAMUSCULAR; INTRAVENOUS; SUBCUTANEOUS EVERY 10 MIN PRN
Status: DISCONTINUED | OUTPATIENT
Start: 2019-08-01 | End: 2019-08-02 | Stop reason: HOSPADM

## 2019-08-01 RX ORDER — ONDANSETRON 2 MG/ML
4 INJECTION INTRAMUSCULAR; INTRAVENOUS EVERY 30 MIN PRN
Status: DISCONTINUED | OUTPATIENT
Start: 2019-08-01 | End: 2019-08-02 | Stop reason: HOSPADM

## 2019-08-01 RX ORDER — PROPOFOL 10 MG/ML
INJECTION, EMULSION INTRAVENOUS CONTINUOUS PRN
Status: DISCONTINUED | OUTPATIENT
Start: 2019-08-01 | End: 2019-08-01

## 2019-08-01 RX ORDER — FENTANYL CITRATE 50 UG/ML
25-50 INJECTION, SOLUTION INTRAMUSCULAR; INTRAVENOUS
Status: DISCONTINUED | OUTPATIENT
Start: 2019-08-01 | End: 2019-08-02 | Stop reason: HOSPADM

## 2019-08-01 RX ORDER — PROPOFOL 10 MG/ML
INJECTION, EMULSION INTRAVENOUS PRN
Status: DISCONTINUED | OUTPATIENT
Start: 2019-08-01 | End: 2019-08-01

## 2019-08-01 RX ORDER — ACETAMINOPHEN 325 MG/1
975 TABLET ORAL ONCE
Status: COMPLETED | OUTPATIENT
Start: 2019-08-01 | End: 2019-08-01

## 2019-08-01 RX ORDER — ONDANSETRON 4 MG/1
4 TABLET, ORALLY DISINTEGRATING ORAL EVERY 30 MIN PRN
Status: DISCONTINUED | OUTPATIENT
Start: 2019-08-01 | End: 2019-08-02 | Stop reason: HOSPADM

## 2019-08-01 RX ORDER — DEXAMETHASONE SODIUM PHOSPHATE 4 MG/ML
INJECTION, SOLUTION INTRA-ARTICULAR; INTRALESIONAL; INTRAMUSCULAR; INTRAVENOUS; SOFT TISSUE PRN
Status: DISCONTINUED | OUTPATIENT
Start: 2019-08-01 | End: 2019-08-01

## 2019-08-01 RX ORDER — CEFAZOLIN SODIUM 1 G/3ML
1 INJECTION, POWDER, FOR SOLUTION INTRAMUSCULAR; INTRAVENOUS SEE ADMIN INSTRUCTIONS
Status: DISCONTINUED | OUTPATIENT
Start: 2019-08-01 | End: 2019-08-02 | Stop reason: HOSPADM

## 2019-08-01 RX ORDER — HYDRALAZINE HYDROCHLORIDE 20 MG/ML
2.5-5 INJECTION INTRAMUSCULAR; INTRAVENOUS EVERY 10 MIN PRN
Status: DISCONTINUED | OUTPATIENT
Start: 2019-08-01 | End: 2019-08-02 | Stop reason: HOSPADM

## 2019-08-01 RX ORDER — SODIUM CHLORIDE, SODIUM LACTATE, POTASSIUM CHLORIDE, CALCIUM CHLORIDE 600; 310; 30; 20 MG/100ML; MG/100ML; MG/100ML; MG/100ML
INJECTION, SOLUTION INTRAVENOUS CONTINUOUS
Status: DISCONTINUED | OUTPATIENT
Start: 2019-08-01 | End: 2019-08-02 | Stop reason: HOSPADM

## 2019-08-01 RX ORDER — ALBUTEROL SULFATE 0.83 MG/ML
2.5 SOLUTION RESPIRATORY (INHALATION) EVERY 4 HOURS PRN
Status: DISCONTINUED | OUTPATIENT
Start: 2019-08-01 | End: 2019-08-02 | Stop reason: HOSPADM

## 2019-08-01 RX ORDER — MEPERIDINE HYDROCHLORIDE 25 MG/ML
12.5 INJECTION INTRAMUSCULAR; INTRAVENOUS; SUBCUTANEOUS
Status: DISCONTINUED | OUTPATIENT
Start: 2019-08-01 | End: 2019-08-02 | Stop reason: HOSPADM

## 2019-08-01 RX ORDER — OXYBUTYNIN CHLORIDE 5 MG/1
5 TABLET, EXTENDED RELEASE ORAL DAILY
Qty: 10 TABLET | Refills: 0 | Status: SHIPPED | OUTPATIENT
Start: 2019-08-01 | End: 2019-11-17

## 2019-08-01 RX ORDER — LIDOCAINE HYDROCHLORIDE 20 MG/ML
INJECTION, SOLUTION INFILTRATION; PERINEURAL PRN
Status: DISCONTINUED | OUTPATIENT
Start: 2019-08-01 | End: 2019-08-01

## 2019-08-01 RX ORDER — ONDANSETRON 2 MG/ML
INJECTION INTRAMUSCULAR; INTRAVENOUS PRN
Status: DISCONTINUED | OUTPATIENT
Start: 2019-08-01 | End: 2019-08-01

## 2019-08-01 RX ORDER — LIDOCAINE 40 MG/G
CREAM TOPICAL
Status: DISCONTINUED | OUTPATIENT
Start: 2019-08-01 | End: 2019-08-02 | Stop reason: HOSPADM

## 2019-08-01 RX ORDER — CEFAZOLIN SODIUM 2 G/100ML
2 INJECTION, SOLUTION INTRAVENOUS
Status: COMPLETED | OUTPATIENT
Start: 2019-08-01 | End: 2019-08-01

## 2019-08-01 RX ORDER — KETOROLAC TROMETHAMINE 30 MG/ML
INJECTION, SOLUTION INTRAMUSCULAR; INTRAVENOUS PRN
Status: DISCONTINUED | OUTPATIENT
Start: 2019-08-01 | End: 2019-08-01

## 2019-08-01 RX ORDER — NALOXONE HYDROCHLORIDE 0.4 MG/ML
.1-.4 INJECTION, SOLUTION INTRAMUSCULAR; INTRAVENOUS; SUBCUTANEOUS
Status: DISCONTINUED | OUTPATIENT
Start: 2019-08-01 | End: 2019-08-02 | Stop reason: HOSPADM

## 2019-08-01 RX ORDER — PHYSOSTIGMINE SALICYLATE 1 MG/ML
1.2 INJECTION INTRAVENOUS
Status: DISCONTINUED | OUTPATIENT
Start: 2019-08-01 | End: 2019-08-02 | Stop reason: HOSPADM

## 2019-08-01 RX ORDER — FENTANYL CITRATE 50 UG/ML
INJECTION, SOLUTION INTRAMUSCULAR; INTRAVENOUS PRN
Status: DISCONTINUED | OUTPATIENT
Start: 2019-08-01 | End: 2019-08-01

## 2019-08-01 RX ORDER — OXYCODONE HYDROCHLORIDE 5 MG/1
5 TABLET ORAL EVERY 6 HOURS PRN
Qty: 9 TABLET | Refills: 0 | Status: SHIPPED | OUTPATIENT
Start: 2019-08-01 | End: 2019-11-17

## 2019-08-01 RX ADMIN — DEXAMETHASONE SODIUM PHOSPHATE 4 MG: 4 INJECTION, SOLUTION INTRA-ARTICULAR; INTRALESIONAL; INTRAMUSCULAR; INTRAVENOUS; SOFT TISSUE at 14:22

## 2019-08-01 RX ADMIN — CEFAZOLIN SODIUM 2 G: 2 INJECTION, SOLUTION INTRAVENOUS at 14:07

## 2019-08-01 RX ADMIN — PROPOFOL 150 MCG/KG/MIN: 10 INJECTION, EMULSION INTRAVENOUS at 14:10

## 2019-08-01 RX ADMIN — ONDANSETRON 4 MG: 2 INJECTION INTRAMUSCULAR; INTRAVENOUS at 14:26

## 2019-08-01 RX ADMIN — PROPOFOL 200 MG: 10 INJECTION, EMULSION INTRAVENOUS at 14:10

## 2019-08-01 RX ADMIN — LIDOCAINE HYDROCHLORIDE 60 MG: 20 INJECTION, SOLUTION INFILTRATION; PERINEURAL at 14:10

## 2019-08-01 RX ADMIN — SODIUM CHLORIDE, SODIUM LACTATE, POTASSIUM CHLORIDE, CALCIUM CHLORIDE: 600; 310; 30; 20 INJECTION, SOLUTION INTRAVENOUS at 14:43

## 2019-08-01 RX ADMIN — SODIUM CHLORIDE, SODIUM LACTATE, POTASSIUM CHLORIDE, CALCIUM CHLORIDE: 600; 310; 30; 20 INJECTION, SOLUTION INTRAVENOUS at 14:04

## 2019-08-01 RX ADMIN — KETOROLAC TROMETHAMINE 30 MG: 30 INJECTION, SOLUTION INTRAMUSCULAR; INTRAVENOUS at 14:55

## 2019-08-01 RX ADMIN — FENTANYL CITRATE 50 MCG: 50 INJECTION, SOLUTION INTRAMUSCULAR; INTRAVENOUS at 14:10

## 2019-08-01 RX ADMIN — ACETAMINOPHEN 975 MG: 325 TABLET ORAL at 13:12

## 2019-08-01 NOTE — OP NOTE
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 Cystoscopy  2. RIGHT retrograde pyelogram  3. RIGHT ureteroscopy diagnostic  4. RIGHT ureteroscopy with basketing/biopsy of right renal pelvis  5. RIGHT JJ stent placement  6. <1hr physician fluoroscopy time      STAFF SURGEON: Ramiro Espitia MD  ANESTHESIA: General.   ESTIMATED BLOOD LOSS: 2 mL.   DRAINS AND TUBES: RIGHT 6fr x 24cm ureteral stent, 16fr kimble catheter  COMPLICATIONS: None.   DISPOSITION: PACU.   SPECIMENS OBTAINED:   ID Type Source Tests Collected by Time Destination   A : Right Mid-Calyx Biopsy Tissue Kidney, Right SURGICAL PATHOLOGY EXAM Ramiro Espitia MD 8/1/2019  2:47 PM      SIGNIFICANT FINDINGS: Cystoscopy with no evidence of stone in the bladder. RIGHT Retrograde Pyelogram with evidence of a tight and tortuous distal ureter. RIGHT Ureteroscopy with no evidence of stone in the ureter or collecting system. RIGHT mid calyx with small lesion which appeared benign - biopsied. RIGHT ureteral stent placed.      HISTORY OF PRESENT ILLNESS: Mrs. Ross is a 68 year old female who presented with 4 episodes of gross hematuria which started in April. One of these episodes was associated right lower back pain. Hematuria work up with CT Abd/Pel demonstrated a 6.6 x 8.1 mm right ureteral stone in her distal ureter. She was counseled on treatment options and she elected to proceed with the above surgery. She continued to have no symptoms, however did not see any stone pass in her urine.     OPERATION PERFORMED:   Informed consent was obtained and the patient was brought to the operating room where general anesthesia was induced. The patient  "was given appropriate preoperative antibiotics and positioned supine. The patient was then repositioned in dorsal lithotomy with all pressure points padded. We then performed a timeout, verifying the correct patient's site and procedure to be performed.    A 22fr cystoscope was inserted atraumatically into the bladder. Complete cystoscopy was performed with no evidence of a stone within the bladder. The RIGHT UO was identified and cannulated with a 0.035 Sensor wire which was advanced up the RIGHT kidney under fluoroscopic guidance. The cystoscope was removed. A semi-rigid ureteroscope was assembled and inserted atraumatically into the bladder. An Amplatz Super-stiff wire was used to cannulate the UO and guide the semi-rigid scope into the UO in a \"rail-road\" technique. The distal ureter was noted to be tight. A gentle Retrograde Pyelogram was completed which demonstrated a tight and tortuous distal ureter with no clear stone in the ureter. The scope was advanced up the ureter to the level of the UPJ. There was no evidence of stone in the ureter. Given the size of the stone and that she had not noted stone passage I was concerned that the stone had washed up to the kidney. The super-stiff wire was advanced up to the renal pelvis and the scope was removed. A 11/13 36cm ureteral access sheath was advanced over the super-stiff wire under fluoroscopic guidance to the level of the UPJ and the wire and the inner sheath were removed. The flexible ureteroscope was assembled and advanced up the renal pelvis and complete pyeloscopy was performed. There was no evidence of stone throughout the entire collecting system. There was a small papillary lesion on a mid pole calyx. This was biopsy removed with a Halo basket. The scope and the sheath were removed en bloc with complete visualization of the ureter. There was no evidence of ureteral injury. A 6fr x 24cm JJ stent was advanced over the Sensor wire with good curl noted in the " renal pelvis and in the bladder fluoroscopically. A 16fr kimble was placed.   The patient was emerged from anesthesia and recovered in the PACU.      Ramiro Espitia MD   Urology  [facility]  Clinic Phone [phone]

## 2019-08-01 NOTE — DISCHARGE INSTRUCTIONS
POSTOPERATIVE INSTRUCTIONS    Diagnosis-------------------------------   RIGHT ureteral stone  RIGHT renal lesion    Procedure-------------------------------  Procedure(s) (LRB):  COMBINED CYSTOSCOPY, URETEROSCOPY, LASER HOLMIUM LITHOTRIPSY URETER(S) (Right)  CYSTOSCOPY, WITH URETERAL STENT INSERTION (Right)      Findings--------------------------------  Cystoscopy with no evidence of stone in the bladder. RIGHT Retrograde Pyelogram with evidence of a tight and tortuous distal ureter. RIGHT Ureteroscopy with no evidence of stone in the ureter or collecting system. RIGHT mid calyx with small lesion which appeared benign - this was biopsied. RIGHT ureteral stent placed.     Home-going instructions-----------------         Activity Limitation:     - No driving or operating heavy machinery while on narcotic pain medication.     FOLLOW THESE INSTRUCTIONS AS INDICATED BELOW:  - Observe operative area for signs of excessive bleeding.  - You may shower.  - Increase fluid intake to promote clear urine.  - Resume usual diet as tolerated    What to expect while recovering-----------  - You may experience some intermittent bleeding that makes your urine pink or cherry colored. This is normal.  - However, if you are unable to urinate, passing large amount of clots, have marc blood in your urine, or have a temperature >101 degrees, call the urology nurse on call, or present to your nearest emergency department.  - You are encouraged to walk daily, and have no activity restrictions.   - A URETERAL STENT has been placed that allows urine to flow unobstructed from your kidney into your bladder.  The stent has a curl in the kidney and a curl in the bladder.  The curl in the bladder can cause some urgency and frequency of urination as well as some mild blood in the urine.  The curl in the kidney can cause some mild flank discomfort.  This may be more noticeable when you urinate.  A URETERAL STENT is meant to be left in temporarily.   It must be removed or changed no later than 3 months after it's insertion.  If it's not removed it can result in stone overgrowth on the stent that can cause pain, infection, and can be very difficult to remove.      Discharge Medications/instructions:     - Flomax (tamsulosin) to be taken daily until stent is removed    - Ditropan to be taken daily until stent is removed    - Take Tylenol 1000mg every 6 hours for pain    - Take Toradol 10mg every 6 hours as needed for additional pain control    - Take Oxycodone 5mg every 4-6 hours only for break through pain    - Take Colace while taking Oxycodone to prevent constipation      Questions/concerns------------------------  [facility]: [phone]  [facility]: [phone]    Future appointments  You are scheduled for ureteral stent removal next Thursday at 1pm.    Ramiro Espitia MD   [facility]  Same-Day Surgery   Adult Discharge Orders & Instructions   For 24 hours after surgery  1. Get plenty of rest.  A responsible adult must stay with you for at least 24 hours after you leave the hospital.   2. Do not drive or use heavy equipment.  If you have weakness or tingling, don't drive or use heavy equipment until this feeling goes away.  3. Do not drink alcohol.  4. Avoid strenuous or risky activities.  Ask for help when climbing stairs.   5. You may feel lightheaded.  IF so, sit for a few minutes before standing.  Have someone help you get up.   6. If you have nausea (feel sick to your stomach): Drink only clear liquids such as apple juice, ginger ale, broth or 7-Up.  Rest may also help.  Be sure to drink enough fluids.  Move to a regular diet as you feel able.  7. You may have a slight fever. Call the doctor if your fever is over 100 F (37.7 C) (taken under the tongue) or lasts longer than 24 hours.  8. You may have a dry mouth, a sore throat, muscle aches or trouble sleeping.  These should go away after 24  hours.  9. Do not make important or legal decisions.   Call your doctor for any of the followin.  Signs of infection (fever, growing tenderness at the surgery site, a large amount of drainage or bleeding, severe pain, foul-smelling drainage, redness, swelling).    2. It has been over 8 to 10 hours since surgery and you are still not able to urinate (pass water).    3.  Headache for over 24 hours.    4.  Numbness, tingling or weakness the day after surgery (if you had spinal anesthesia).

## 2019-08-01 NOTE — ANESTHESIA PREPROCEDURE EVALUATION
Anesthesia Pre-Procedure Evaluation    Patient: Florencia Ross   MRN:     [id number] Gender:   female   Age:    68 year old :      1950        Preoperative Diagnosis: Right ureteral stone   Procedure(s):  COMBINED CYSTOSCOPY, URETEROSCOPY, LASER HOLMIUM LITHOTRIPSY URETER(S)  CYSTOSCOPY, WITH URETERAL STENT INSERTION     Past Medical History:   Diagnosis Date     Heart murmur      Hypertension       Past Surgical History:   Procedure Laterality Date     COLONOSCOPY       COLONOSCOPY  2019    4 polyps     COLONOSCOPY N/A 3/14/2019    Procedure: Combined Colonoscopy, Single Or Multiple Biopsy/Polypectomy By Biopsy;  Surgeon: Arun Ruano MD;  Location: [facility] OR     COLONOSCOPY WITH CO2 INSUFFLATION N/A 3/14/2019    Procedure: COLONOSCOPY WITH CO2 INSUFFLATION;  Surgeon: Arun Ruano MD;  Location:  OR     GYN SURGERY      c section          Anesthesia Evaluation     .             ROS/MED HX    ENT/Pulmonary:  - neg pulmonary ROS     Neurologic:  - neg neurologic ROS     Cardiovascular:  - neg cardiovascular ROS   (+) hypertension----. : . . . :. .       METS/Exercise Tolerance:     Hematologic:  - neg hematologic  ROS       Musculoskeletal:  - neg musculoskeletal ROS       GI/Hepatic:  - neg GI/hepatic ROS       Renal/Genitourinary:  - ROS Renal section negative       Endo:  - neg endo ROS       Psychiatric:  - neg psychiatric ROS       Infectious Disease:  - neg infectious disease ROS       Malignancy:      - no malignancy   Other:    - neg other ROS                     PHYSICAL EXAM:   Mental Status/Neuro: A/A/O   Airway: Facies: Feasible  Mallampati: II  Mouth/Opening: Full  TM distance: > 6 cm  Neck ROM: Full   Respiratory: Auscultation: CTAB     Resp. Rate: Normal     Resp. Effort: Normal      CV: Rhythm: Regular  Rate: Age appropriate  Heart: Normal Sounds  Edema: None   Comments:      Dental: Normal Dentition                LABS:  CBC: No results found for: WBC, HGB,  "HCT, PLT  BMP:   Lab Results   Component Value Date     07/23/2019     12/03/2018    POTASSIUM 3.2 (L) 07/23/2019    POTASSIUM 3.8 12/03/2018    CHLORIDE 101 07/23/2019    CHLORIDE 101 12/03/2018    CO2 32 07/23/2019    CO2 27 12/03/2018    BUN 11 07/23/2019    BUN 10 12/03/2018    CR 0.65 07/23/2019    CR 0.75 12/03/2018    GLC 63 (L) 07/23/2019    GLC 90 12/03/2018     COAGS: No results found for: PTT, INR, FIBR  POC: No results found for: BGM, HCG, HCGS  OTHER:   Lab Results   Component Value Date    CARROLL 9.1 07/23/2019        Preop Vitals    BP Readings from Last 3 Encounters:   08/01/19 (!) 157/68   07/23/19 119/69   06/28/19 153/79    Pulse Readings from Last 3 Encounters:   08/01/19 60   07/23/19 77   06/28/19 65      Resp Readings from Last 3 Encounters:   07/23/19 16   03/14/19 16   12/03/18 17    SpO2 Readings from Last 3 Encounters:   08/01/19 98%   07/23/19 98%   06/28/19 98%      Temp Readings from Last 1 Encounters:   08/01/19 37  C (98.6  F) (Temporal)    Ht Readings from Last 1 Encounters:   07/23/19 1.753 m (5' 9\")      Wt Readings from Last 1 Encounters:   07/23/19 66.7 kg (147 lb)    Estimated body mass index is 21.71 kg/m  as calculated from the following:    Height as of 7/23/19: 1.753 m (5' 9\").    Weight as of 7/23/19: 66.7 kg (147 lb).     LDA:  Peripheral IV 08/01/19 Right Upper arm (Active)   Site Assessment WDL 8/1/2019 12:48 PM   Line Status Infusing 8/1/2019 12:48 PM   Phlebitis Scale 0-->no symptoms 8/1/2019 12:48 PM   Infiltration Scale 0 8/1/2019 12:48 PM   Infiltration Site Treatment Method  None 8/1/2019 12:48 PM   Extravasation? No 8/1/2019 12:48 PM   Dressing Intervention New dressing  8/1/2019 12:48 PM   Number of days: 0       Airway - Adult/Peds laryngeal mask airway (Active)   Number of days: 0        Assessment:   ASA SCORE: 1    H&P: History and physical reviewed and following examination; no interval change.   Smoking Status:  Non-Smoker/Unknown   NPO Status: NPO " Appropriate     Plan:   Anes. Type:  General   Pre-Medication: None   Induction:  IV (Standard)   Airway: LMA   Access/Monitoring: PIV   Maintenance: Balanced     Postop Plan:   Postop Pain: Opioids  Postop Sedation/Airway: Not planned  Disposition: Outpatient     PONV Management:   Adult Risk Factors: Female, Non-Smoker, Postop Opioids   Prevention: Ondansetron, Propofol     CONSENT: Direct conversation   Plan and risks discussed with: Patient   Blood Products: Consent Deferred (Minimal Blood Loss)                   Tariq You MD

## 2019-08-01 NOTE — ANESTHESIA CARE TRANSFER NOTE
Patient: Florencia Ross    Procedure(s):  COMBINED CYSTOSCOPY, URETEROSCOPY, BASKET REMOVAL OF LESION  CYSTOSCOPY, WITH URETERAL STENT INSERTION    Diagnosis: Right ureteral stone  Diagnosis Additional Information: No value filed.    Anesthesia Type:   General     Note:  Airway :Nasal Cannula  Patient transferred to:PACU  Comments: Awake, comfortable, sats 100%< report to RN.Handoff Report: Identifed the Patient, Identified the Reponsible Provider, Reviewed the pertinent medical history, Discussed the surgical course, Reviewed Intra-OP anesthesia mangement and issues during anesthesia, Set expectations for post-procedure period and Allowed opportunity for questions and acknowledgement of understanding      Vitals: (Last set prior to Anesthesia Care Transfer)    CRNA VITALS  8/1/2019 1440 - 8/1/2019 1510      8/1/2019             Pulse:  72    SpO2:  90 %                Electronically Signed By: TEREZA Womack CRNA  August 1, 2019  3:10 PM

## 2019-08-02 ENCOUNTER — MYC MEDICAL ADVICE (OUTPATIENT)
Dept: UROLOGY | Facility: CLINIC | Age: 69
End: 2019-08-02

## 2019-08-02 NOTE — TELEPHONE ENCOUNTER
I called her back and explained what the medications are for.     Per Dr. Espitia she can use the Oxybutynin when she wants to.  She is correct and should not use it with ETOH.  If she is not having pain, she doesn't need to use the Ketorolac or Oxycodone. If the Tylenol covers her discomfort she can stick to that.  She was ok with using the Flomax.  I also reviewed that she can have intercourse with the stent in.  She will call back if she has anyother questions.  jmh                 Hello again,   This is what I need to understand regarding the many prescriptions I have.   I did not take and do not expect to take the Oxycodone or the Ketorolac. I think they are for pain management is that correct?   I took one Oxybutynin last night because Wade said he was told I should take this because of my bladder. Please explain. This pill has a do not drink alcohol warning on the bottle and I am going to a big anniversary party tomorrow so it would be sad not to have a glass or two of champagne so I would like to not take this drug. Please advise ASAP.     What about the Tamsulosin? I think I am also supposed to be taking that... So last evening I took one Tamsulosin, one Oxybutnin, and one extra strength 500 mg generic acetaminophen. Again, I would like to not take the Oxybutnin.     I slept well, no color in my urine, and I am feeling fairly normal otherwise.   ~Florencia     Also, what about sex while I have this stent in? I do not want it falling out and there is nothing in the handout about that, in case it that area would be affected.   Florencia Ross

## 2019-08-05 ENCOUNTER — NURSE TRIAGE (OUTPATIENT)
Dept: CALL CENTER | Age: 69
End: 2019-08-05

## 2019-08-05 LAB — COPATH REPORT: NORMAL

## 2019-08-05 NOTE — TELEPHONE ENCOUNTER
68 year old female s/p Cystoscopy,RIGHT ureteroscopy and JJ stent placement on 8/1/19.  She calls today because she continues to have blood in her urine  It has been continuous since surgery and today she noted bright red blood on her toilet paper which is new  She describes her urine as cherry koolaid.  She denies pain when urinating   States she is drinking 6-8 plus glasses of water a day.  She is having bowel movements -no blood noted/no diarrhea.  Denies N/V. Denies fever.       Encouraged her to continue to drink water and increase the amount if tolerated.    Will forward to urology team to call back with more recommendations

## 2019-08-05 NOTE — TELEPHONE ENCOUNTER
Informed patient that this is normal to see blood in urine with stent, until the stent is removed. Patient is aware and will keep drinking plenty of water.     Perri Pacheco LPN

## 2019-08-06 ENCOUNTER — TELEPHONE (OUTPATIENT)
Dept: SURGERY | Facility: CLINIC | Age: 69
End: 2019-08-06

## 2019-08-06 NOTE — TELEPHONE ENCOUNTER
Florencia Ross  [id number]  August 6, 2019  1:09 PM    I called and left a message regarding the pathology results from the renal calyx biopsy.  I informed her that this was negative for malignancy.  She will return later this week to have her ureteral stent removed.    Ramiro Espitia M.D.

## 2019-08-08 ENCOUNTER — OFFICE VISIT (OUTPATIENT)
Dept: UROLOGY | Facility: CLINIC | Age: 69
End: 2019-08-08
Payer: COMMERCIAL

## 2019-08-08 DIAGNOSIS — Z46.6 ENCOUNTER FOR REMOVAL OF URETERAL STENT: Primary | ICD-10-CM

## 2019-08-08 PROCEDURE — 52310 CYSTOSCOPY AND TREATMENT: CPT | Performed by: UROLOGY

## 2019-08-08 ASSESSMENT — PAIN SCALES - GENERAL: PAINLEVEL: NO PAIN (1)

## 2019-08-08 NOTE — PROGRESS NOTES
CYSTOSCOPY AND URETERAL STENT REMOVAL PROCEDURE NOTE:    Florencia Ross is a 68 year old female who presents with ureteral stent for a cystoscopy and ureteral stent removal.    Pt ID verified with patient: Yes     Procedure verified with patient: Yes     Procedure confirmed with physician and support staff: Yes     Consent confirmed with physician and support staff.    Sign In:  History and Physical Exam reviewed  Primary Diagnosis: ureteral stent   Informed Consent Discussed: Yes   Sign in Communication: Yes   Time Out:  Team Confirms the Correct Patient, Correct Procedure; Yes , Correct Site and Site Marking, Correct Position (if applicable).  Affirmation of Time Out: Yes   Sign Out:  Sign Out Discussion: Yes     Florencia Ross is a 68 year old female with an indwelling ureteral stent in need of removal.    CYSTOSCOPY PROCEDURE:  After sterile preparation and draping of the patient,  a 17-[place] flexible cystoscope was introduced via the urethra.  It was passed without difficulty into the bladder.  The urethra was open without evidence of stricture.  The ureteral orifices were orthotopic.  The double J stent was seen coming out the right side.  It was grasped with an alligator forceps and extracted intact without difficulty.  The patient tolerated the procedure well.    Pathology:  FINAL DIAGNOSIS:   Mid-calyx, right, biopsy:   - Benign stromal tissue with denuded urothelium   - Negative for malignancy     A/P Successful stent removal  Prophylactic antibiotic ordered    Watch for any new onset fevers, signs of UTI.  May expect some pain after removal.  If this is severe, or last many hours, you may need to return for replacement of stent.    Ramiro Espitia MD   Urology  [facility]

## 2019-08-19 DIAGNOSIS — N20.1 RIGHT URETERAL STONE: ICD-10-CM

## 2019-08-19 RX ORDER — TAMSULOSIN HYDROCHLORIDE 0.4 MG/1
0.4 CAPSULE ORAL DAILY
Qty: 30 CAPSULE | Refills: 0 | OUTPATIENT
Start: 2019-08-19

## 2019-08-19 NOTE — TELEPHONE ENCOUNTER
Medication request: tamsulosin 0.4 mg  Sig: take one pill by mouth daily  Last filled: 7/23/19  Last Qty: 30  Pt's last office visit: 8/8/19  Next scheduled office visit: none    Upon chart review ureteral stent removed on 8/8/19 and stone had passed at that point. Refill denied and sent to pharmacy. Shelby Porras RN

## 2019-09-16 DIAGNOSIS — A60.00 RECURRENT GENITAL HERPES: ICD-10-CM

## 2019-09-17 RX ORDER — ACYCLOVIR 400 MG/1
400 TABLET ORAL 2 TIMES DAILY
Qty: 180 TABLET | Refills: 0 | Status: SHIPPED | OUTPATIENT
Start: 2019-09-17 | End: 2020-03-27

## 2019-10-04 ENCOUNTER — TELEPHONE (OUTPATIENT)
Dept: FAMILY MEDICINE | Facility: CLINIC | Age: 69
End: 2019-10-04

## 2019-10-04 NOTE — LETTER
Florencia Ross  [address] [address] [id number]          October 4, 2019          Dear Florencia Ross      Our records indicate that you have not scheduled for a(n)Annual physical exam  which was recommended by your health care team. Monitoring and managing your preventative and chronic health conditions are very important to us.       If you have received your health care elsewhere, please provide us with that information so it can be documented in your chart.    Please call [phone] or message us through your [facility] account to schedule an appointment or provide information for your chart.     We look forward to seeing you and working with you on your health care needs.     Sincerely,   Shanel Cortez MD/paty          *If you have already scheduled an appointment, please disregard this reminder

## 2019-10-04 NOTE — TELEPHONE ENCOUNTER
Panel Management Review      Patient has the following on her problem list:     Hypertension   Last three blood pressure readings:  BP Readings from Last 3 Encounters:   08/01/19 (!) 158/94   07/23/19 119/69   06/28/19 153/79     Blood pressure: FAILED    HTN Guidelines:  Less than 140/90      Composite cancer screening  Chart review shows that this patient is due/due soon for the following None  Summary:    Patient is due/failing the following:   BP CHECK and PHYSICAL    Action needed:   Patient needs office visit for physical and BP check.    Type of outreach:    Sent letter.    Questions for provider review:    None                                                                                                                                    Chelo Leal MA on 10/4/2019 at 4:29 PM     Chart routed to sent letter .

## 2019-10-17 DIAGNOSIS — I10 HYPERTENSION GOAL BP (BLOOD PRESSURE) < 140/90: ICD-10-CM

## 2019-10-17 RX ORDER — TRIAMTERENE/HYDROCHLOROTHIAZID 37.5-25 MG
TABLET ORAL
Qty: 90 TABLET | Refills: 1 | Status: SHIPPED | OUTPATIENT
Start: 2019-10-17 | End: 2020-03-01

## 2019-10-17 NOTE — TELEPHONE ENCOUNTER
"Routing refill request to provider for review/approval because:  Requested Prescriptions   Pending Prescriptions Disp Refills     triamterene-HCTZ (MAXZIDE-25) 37.5-25 MG tablet [Pharmacy Med Name: TRIAMTERENE-HCTZ 37.5-25 MG TB] 90 tablet 1     Sig: TAKE 1 TABLET BY MOUTH EVERY DAY       Diuretics (Including Combos) Protocol Failed - 10/17/2019  1:43 AM        Failed - Blood pressure under 140/90 in past 12 months     BP Readings from Last 3 Encounters:   08/01/19 (!) 158/94   07/23/19 119/69   06/28/19 153/79                 Failed - Normal serum potassium on file in past 12 months     Recent Labs   Lab Test 07/23/19  0839   POTASSIUM 3.2*                    Passed - Recent (12 mo) or future (30 days) visit within the authorizing provider's specialty     Patient has had an office visit with the authorizing provider or a provider within the authorizing providers department within the previous 12 mos or has a future within next 30 days. See \"Patient Info\" tab in inbasket, or \"Choose Columns\" in Meds & Orders section of the refill encounter.              Passed - Medication is active on med list        Passed - Patient is age 18 or older        Passed - No active pregancy on record        Passed - Normal serum creatinine on file in past 12 months     Recent Labs   Lab Test 07/23/19  0839   CR 0.65              Passed - Normal serum sodium on file in past 12 months     Recent Labs   Lab Test 07/23/19  0839                 Passed - No positive pregnancy test in past 12 months            Judy MELON, RN, CPN          "

## 2019-11-07 ENCOUNTER — DOCUMENTATION ONLY (OUTPATIENT)
Dept: LAB | Facility: CLINIC | Age: 69
End: 2019-11-07

## 2019-11-07 DIAGNOSIS — I10 HYPERTENSION GOAL BP (BLOOD PRESSURE) < 140/90: ICD-10-CM

## 2019-11-07 DIAGNOSIS — Z13.6 CARDIOVASCULAR SCREENING; LDL GOAL LESS THAN 130: Primary | ICD-10-CM

## 2019-11-13 DIAGNOSIS — Z13.6 CARDIOVASCULAR SCREENING; LDL GOAL LESS THAN 130: ICD-10-CM

## 2019-11-13 DIAGNOSIS — I10 HYPERTENSION GOAL BP (BLOOD PRESSURE) < 140/90: ICD-10-CM

## 2019-11-13 LAB
ANION GAP SERPL CALCULATED.3IONS-SCNC: 2 MMOL/L (ref 3–14)
BUN SERPL-MCNC: 11 MG/DL (ref 7–30)
CALCIUM SERPL-MCNC: 9.5 MG/DL (ref 8.5–10.1)
CHLORIDE SERPL-SCNC: 102 MMOL/L (ref 94–109)
CHOLEST SERPL-MCNC: 199 MG/DL
CO2 SERPL-SCNC: 33 MMOL/L (ref 20–32)
CREAT SERPL-MCNC: 0.6 MG/DL (ref 0.52–1.04)
GFR SERPL CREATININE-BSD FRML MDRD: >90 ML/MIN/{1.73_M2}
GLUCOSE SERPL-MCNC: 89 MG/DL (ref 70–99)
HDLC SERPL-MCNC: 64 MG/DL
LDLC SERPL CALC-MCNC: 121 MG/DL
NONHDLC SERPL-MCNC: 135 MG/DL
POTASSIUM SERPL-SCNC: 3.7 MMOL/L (ref 3.4–5.3)
SODIUM SERPL-SCNC: 137 MMOL/L (ref 133–144)
TRIGL SERPL-MCNC: 72 MG/DL

## 2019-11-13 PROCEDURE — 80048 BASIC METABOLIC PNL TOTAL CA: CPT | Performed by: FAMILY MEDICINE

## 2019-11-13 PROCEDURE — 36415 COLL VENOUS BLD VENIPUNCTURE: CPT | Performed by: FAMILY MEDICINE

## 2019-11-13 PROCEDURE — 80061 LIPID PANEL: CPT | Performed by: FAMILY MEDICINE

## 2019-11-13 NOTE — PROGRESS NOTES
"SUBJECTIVE:   Florencia Ross is a 69 year old female who presents for Preventive Visit.  {PVP to remind patient that this is not necessarily a physical exam; physical exam may or may not be done:939523::\"click delete button to remove this line now\"}  {PVP to inform patient that additional E&M charge may apply, if additional problems addressed:027134::\"click delete button to remove this line now\"}  Are you in the first 12 months of your Medicare coverage?  { :757194::\"No\"}    HPI  Do you feel safe in your environment? { :574705}    Have you ever done Advance Care Planning? (For example, a Health Directive, POLST, or a discussion with a medical provider or your loved ones about your wishes): { :559815}    {Hearing Test Done (Optional):296052}  Fall risk  { :684968}  {If any of the above assessments are answered yes, consider ordering appropriate referrals (Optional):767123::\"click delete button to remove this line now\"}  Cognitive Screening { :723060}    {Do you have sleep apnea, excessive snoring or daytime drowsiness? (Optional):039460}    Reviewed and updated as needed this visit by clinical staff         Reviewed and updated as needed this visit by Provider        Social History     Tobacco Use     Smoking status: Former Smoker     Last attempt to quit: 1977     Years since quittin.3     Smokeless tobacco: Never Used   Substance Use Topics     Alcohol use: Yes     Comment: 5-7 drinks a week     {Rooming Staff- Complete this question if Prescreen response is not shown below for today's visit. If you drink alcohol do you typically have >3 drinks per day or >7 drinks per week? (Optional):043803}    No flowsheet data found.{add AUDIT responses (Optional) (A score of 7 for adult men is an indication of hazardous drinking; a score of 8 or more is an indication of an alcohol use disorder.  A score of 7 or more for adult women is an indication of hazardous drinking or an alchohol use disorder):990459}    {Outside " "tests to abstract? :659164}    {additional problems to add (Optional):124138}    Current providers sharing in care for this patient include: {Rooming staff:  Please update Care Team in Rooming Activity, refresh this note and then delete this statement}  Patient Care Team:  Shanel Cortez MD as PCP - General (Family Practice)  Shanel Cortez MD as Assigned PCP    The following health maintenance items are reviewed in Epic and correct as of today:  Health Maintenance   Topic Date Due     ZOSTER IMMUNIZATION (1 of 2) 2002     MEDICARE ANNUAL WELLNESS VISIT  2017     PHQ-2  2019     FALL RISK ASSESSMENT  2019     LIPID  2019     PNEUMOCOCCAL IMMUNIZATION 65+ LOW/MEDIUM RISK (2 of 2 - PPSV23) 2019     BMP  2020     MAMMO SCREENING  2021     ADVANCE CARE PLANNING  2023     COLONOSCOPY  2024     DTAP/TDAP/TD IMMUNIZATION (2 - Td) 2026     DEXA  Completed     HEPATITIS C SCREENING  Completed     INFLUENZA VACCINE  Completed     IPV IMMUNIZATION  Aged Out     MENINGITIS IMMUNIZATION  Aged Out     {Chronicprobdata (optional):298001}  {Decision Support (Optional):583881}    Review of Systems  {ROS COMP (Optional):397734}    OBJECTIVE:   There were no vitals taken for this visit. Estimated body mass index is 21.71 kg/m  as calculated from the following:    Height as of 19: 1.753 m (5' 9\").    Weight as of 19: 66.7 kg (147 lb).  Physical Exam  {Exam (Optional) :516981}    {Diagnostic Test Results (Optional):261246::\"Diagnostic Test Results:\",\"Labs reviewed in Epic\"}    ASSESSMENT / PLAN:   {Diag Picklist:766040}    COUNSELING:  {Medicare Counselin}    Estimated body mass index is 21.71 kg/m  as calculated from the following:    Height as of 19: 1.753 m (5' 9\").    Weight as of 19: 66.7 kg (147 lb).    {Weight Management Plan (ACO) Complete if BMI is abnormal-  Ages 18-64  BMI >24.9.  Age 65+ with BMI <23 or >30 (Optional):314355}     " "reports that she quit smoking about 42 years ago. She has never used smokeless tobacco.  {Tobacco Cessation -- Complete if patient is a smoker (Optional):222425}    Appropriate preventive services were discussed with this patient, including applicable screening as appropriate for cardiovascular disease, diabetes, osteopenia/osteoporosis, and glaucoma.  As appropriate for age/gender, discussed screening for colorectal cancer, prostate cancer, breast cancer, and cervical cancer. Checklist reviewing preventive services available has been given to the patient.    Reviewed patients plan of care and provided an AVS. The {CarePlan:485977} for Florencia meets the Care Plan requirement. This Care Plan has been established and reviewed with the {PATIENT, FAMILY MEMBER, CAREGIVER:975405}.    Counseling Resources:  ATP IV Guidelines  Pooled Cohorts Equation Calculator  Breast Cancer Risk Calculator  FRAX Risk Assessment  ICSI Preventive Guidelines  Dietary Guidelines for Americans, 2010  [employer]'s MyPlate  ASA Prophylaxis  Lung CA Screening    Shanel Hartman MD  [facility]    Identified Health Risks:  SUBJECTIVE:   Florencia Ross is a 69 year old female who presents for Preventive Visit.  {PVP to remind patient that this is not necessarily a physical exam; physical exam may or may not be done:143430::\"click delete button to remove this line now\"}  {PVP to inform patient that additional E&M charge may apply, if additional problems addressed:893360::\"click delete button to remove this line now\"}  Are you in the first 12 months of your Medicare Part B coverage?  { :642799::\"No\"}    Physical Health:    In general, how would you rate your overall physical health? { :006379}    Outside of work, how many days during the week do you exercise? { :349810}    Outside of work, approximately how many minutes a day do you exercise?{ :954026}    If you drink alcohol do you typically have >3 drinks per day or >7 drinks per week? { " ":593454}    Do you usually eat at least 4 servings of fruit and vegetables a day, include whole grains & fiber and avoid regularly eating high fat or \"junk\" foods? { :068309::\"Yes\"}    Do you have any problems taking medications regularly?  { :207572::\"No\"}    Do you have any side effects from medications? { :220013}    Needs assistance for the following daily activities: { :149083}    Which of the following safety concerns are present in your home?  { :274045::\"none identified\"}     Hearing impairment: { :869875}    In the past 6 months, have you been bothered by leaking of urine? { :273688}    Mental Health:    In general, how would you rate your overall mental or emotional health? { :309143}  PHQ-2 Score:      Do you feel safe in your environment? { :653860}    Have you ever done Advance Care Planning? (For example, a Health Directive, POLST, or a discussion with a medical provider or your loved ones about your wishes): { :238206}    Additional concerns to address?  {If YES, notify patient they may be responsible for a copay, coinsurance or deductible if the visit today includes services such as checking on a sore throat, having an x-ray or lab test, or treating and evaluating a new or existing condition :938231::\"No\"}    Fall risk:  { :416137}  {If any of the above assessments are answered yes, consider ordering appropriate referrals (Optional):673115::\"click delete button to remove this line now\"}  Cognitive Screening: { :159716}    {Do you have sleep apnea, excessive snoring or daytime drowsiness? (Optional):042614}    {Outside tests to abstract? :778155}    {additional problems to add (Optional):633996}    Reviewed and updated as needed this visit by clinical staff  Tobacco  Allergies  Meds  Med Hx  Surg Hx  Fam Hx  Soc Hx        Reviewed and updated as needed this visit by Provider        Social History     Tobacco Use     Smoking status: Former Smoker     Last attempt to quit: 7/11/1977     Years " "since quittin.3     Smokeless tobacco: Never Used   Substance Use Topics     Alcohol use: Yes     Comment: 5-7 drinks a week                           Current providers sharing in care for this patient include: {Rooming staff:  Please update Care Team in Rooming Activity, refresh this note and then delete this statement}  Patient Care Team:  Shanel Cortez MD as PCP - General (Family Practice)  Shanel Cortez MD as Assigned PCP    The following health maintenance items are reviewed in Epic and correct as of today:  Health Maintenance   Topic Date Due     ZOSTER IMMUNIZATION (1 of 2) 2002     MEDICARE ANNUAL WELLNESS VISIT  2017     PHQ-2  2019     FALL RISK ASSESSMENT  2019     PNEUMOCOCCAL IMMUNIZATION 65+ LOW/MEDIUM RISK (2 of 2 - PPSV23) 2019     BMP  2020     LIPID  2020     MAMMO SCREENING  2021     ADVANCE CARE PLANNING  2023     COLONOSCOPY  2024     DTAP/TDAP/TD IMMUNIZATION (2 - Td) 2026     DEXA  Completed     HEPATITIS C SCREENING  Completed     INFLUENZA VACCINE  Completed     IPV IMMUNIZATION  Aged Out     MENINGITIS IMMUNIZATION  Aged Out     {Chronicprobdata (Optional):144454}  {Decision Support (Optional):717919}    ROS:  {ROS COMP:558469}    OBJECTIVE:   /73   Pulse 65   Temp 98.8  F (37.1  C) (Oral)   Ht 1.753 m (5' 9\")   Wt 68.5 kg (151 lb)   BMI 22.30 kg/m   Estimated body mass index is 22.3 kg/m  as calculated from the following:    Height as of this encounter: 1.753 m (5' 9\").    Weight as of this encounter: 68.5 kg (151 lb).  EXAM:   {Exam :860804}    {Diagnostic Test Results (Optional):279612::\"Diagnostic Test Results:\",\"Labs reviewed in Epic\"}    ASSESSMENT / PLAN:   {Diag Picklist:608537}    COUNSELING:  {Medicare Counselin}    Estimated body mass index is 22.3 kg/m  as calculated from the following:    Height as of this encounter: 1.753 m (5' 9\").    Weight as of this encounter: 68.5 kg (151 " lb).    {Weight Management Plan (ACO) Complete if BMI is abnormal-  Ages 18-64  BMI >24.9.  Age 65+ with BMI <23 or >30 (Optional):807049}     reports that she quit smoking about 42 years ago. She has never used smokeless tobacco.  {Tobacco Cessation -- Complete if patient is a smoker (Optional):172793}    Appropriate preventive services were discussed with this patient, including applicable screening as appropriate for cardiovascular disease, diabetes, osteopenia/osteoporosis, and glaucoma.  As appropriate for age/gender, discussed screening for colorectal cancer, prostate cancer, breast cancer, and cervical cancer. Checklist reviewing preventive services available has been given to the patient.    Reviewed patients plan of care and provided an AVS. The {CarePlan:492181} for Florencia meets the Care Plan requirement. This Care Plan has been established and reviewed with the {PATIENT, FAMILY MEMBER, CAREGIVER:185949}.    Counseling Resources:  ATP IV Guidelines  Pooled Cohorts Equation Calculator  Breast Cancer Risk Calculator  FRAX Risk Assessment  ICSI Preventive Guidelines  Dietary Guidelines for Americans, 2010  USDA's MyPlate  ASA Prophylaxis  Lung CA Screening    Shanel Hartman MD  [facility]

## 2019-11-19 ENCOUNTER — OFFICE VISIT (OUTPATIENT)
Dept: FAMILY MEDICINE | Facility: CLINIC | Age: 69
End: 2019-11-19
Payer: COMMERCIAL

## 2019-11-19 VITALS
HEIGHT: 69 IN | HEART RATE: 65 BPM | DIASTOLIC BLOOD PRESSURE: 73 MMHG | TEMPERATURE: 98.8 F | BODY MASS INDEX: 22.36 KG/M2 | SYSTOLIC BLOOD PRESSURE: 129 MMHG | WEIGHT: 151 LBS

## 2019-11-19 DIAGNOSIS — Z00.00 ENCOUNTER FOR MEDICARE ANNUAL WELLNESS EXAM: Primary | ICD-10-CM

## 2019-11-19 DIAGNOSIS — E28.39 ESTROGEN DEFICIENCY: ICD-10-CM

## 2019-11-19 DIAGNOSIS — I10 HYPERTENSION GOAL BP (BLOOD PRESSURE) < 140/90: ICD-10-CM

## 2019-11-19 PROCEDURE — 99397 PER PM REEVAL EST PAT 65+ YR: CPT | Performed by: FAMILY MEDICINE

## 2019-11-19 RX ORDER — TRIAMTERENE/HYDROCHLOROTHIAZID 37.5-25 MG
1 TABLET ORAL DAILY
Qty: 90 TABLET | Refills: 1 | Status: CANCELLED | OUTPATIENT
Start: 2019-11-19

## 2019-11-19 ASSESSMENT — MIFFLIN-ST. JEOR: SCORE: 1284.31

## 2019-11-19 NOTE — PROGRESS NOTES
"  SUBJECTIVE:   Florencia Ross is a 69 year old female who presents for Preventive Visit.    Pt with HTN, well controlled on medication    Due for dexa    Are you in the first 12 months of your Medicare Part B coverage?  No    Physical Health:    In general, how would you rate your overall physical health? good    Outside of work, how many days during the week do you exercise? 2-3 days/week    Outside of work, approximately how many minutes a day do you exercise?greater than 60 minutes    If you drink alcohol do you typically have >3 drinks per day or >7 drinks per week? No    Do you usually eat at least 4 servings of fruit and vegetables a day, include whole grains & fiber and avoid regularly eating high fat or \"junk\" foods? Yes    Do you have any problems taking medications regularly?  No    Do you have any side effects from medications? none    Needs assistance for the following daily activities: no assistance needed    Which of the following safety concerns are present in your home?  none identified     Hearing impairment: Yes, known impairment, has been evaluated    In the past 6 months, have you been bothered by leaking of urine? No     Mental Health:    In general, how would you rate your overall mental or emotional health? good  PHQ-2 Score:      Do you feel safe in your environment? YES    Have you ever done Advance Care Planning? (For example, a Health Directive, POLST, or a discussion with a medical provider or your loved ones about your wishes): Yes, patient states has an Advance Care Planning document and will bring a copy to the clinic.    Additional concerns to address?  No    Fall risk:       Cognitive Screenin) Repeat 3 items (Leader, Season, Table)    2) Clock draw: NORMAL  3) 3 item recall: Recalls 3 objects  Results: 3 items recalled: COGNITIVE IMPAIRMENT LESS LIKELY    Mini-CogTM Copyright [employer]. Licensed by the author for use in [facility]; reprinted with permission " (wisam@[facility]). All rights reserved.      Do you have sleep apnea, excessive snoring or daytime drowsiness?: no            Reviewed and updated as needed this visit by clinical staff  Tobacco  Allergies  Meds  Med Hx  Surg Hx  Fam Hx  Soc Hx        Reviewed and updated as needed this visit by Provider        Social History     Tobacco Use     Smoking status: Former Smoker     Last attempt to quit: 1977     Years since quittin.3     Smokeless tobacco: Never Used   Substance Use Topics     Alcohol use: Yes     Comment: 5-7 drinks a week                           Current providers sharing in care for this patient include:   Patient Care Team:  Shanel Cortez MD as PCP - General (Family Practice)  Shanel Cortez MD as Assigned PCP    The following health maintenance items are reviewed in Epic and correct as of today:  Health Maintenance   Topic Date Due     ZOSTER IMMUNIZATION (1 of 2) 2002     MEDICARE ANNUAL WELLNESS VISIT  2017     PHQ-2  2019     FALL RISK ASSESSMENT  2019     PNEUMOCOCCAL IMMUNIZATION 65+ LOW/MEDIUM RISK (2 of 2 - PPSV23) 2019     BMP  2020     LIPID  2020     MAMMO SCREENING  2021     ADVANCE CARE PLANNING  2023     COLONOSCOPY  2024     DTAP/TDAP/TD IMMUNIZATION (2 - Td) 2026     DEXA  Completed     HEPATITIS C SCREENING  Completed     INFLUENZA VACCINE  Completed     IPV IMMUNIZATION  Aged Out     MENINGITIS IMMUNIZATION  Aged Out     Labs reviewed in EPIC  Pneumonia Vaccine:Adults age 65+ who received Pneumovax (PPSV23) at 65 years or older: Should be given PCV13 > 1 year after their most recent PPSV23  Mammogram Screening: Mammogram Screening: Patient over age 50, mutual decision to screen reflected in health maintenance.    ROS:  Constitutional, HEENT, cardiovascular, pulmonary, gi and gu systems are negative, except as otherwise noted.    OBJECTIVE:   /73   Pulse 65   Temp 98.8  F (37.1  C) (Oral)    "Ht 1.753 m (5' 9\")   Wt 68.5 kg (151 lb)   BMI 22.30 kg/m   Estimated body mass index is 22.3 kg/m  as calculated from the following:    Height as of this encounter: 1.753 m (5' 9\").    Weight as of this encounter: 68.5 kg (151 lb).  EXAM:   GENERAL: healthy, alert and no distress  EYES: Eyes grossly normal to inspection, PERRL and conjunctivae and sclerae normal  HENT: ear canals and TM's normal, nose and mouth without ulcers or lesions  NECK: no adenopathy, no asymmetry, masses, or scars and thyroid normal to palpation  RESP: lungs clear to auscultation - no rales, rhonchi or wheezes  BREAST: normal without masses, tenderness or nipple discharge and no palpable axillary masses or adenopathy  CV: regular rate and rhythm, normal S1 S2, no S3 or S4, no murmur, click or rub, no peripheral edema and peripheral pulses strong  ABDOMEN: soft, nontender, no hepatosplenomegaly, no masses and bowel sounds normal  MS: no gross musculoskeletal defects noted, no edema  SKIN: no suspicious lesions or rashes  NEURO: Normal strength and tone, mentation intact and speech normal  PSYCH: mentation appears normal, affect normal/bright    Diagnostic Test Results:  Labs reviewed in Epic    ASSESSMENT / PLAN:   1. Encounter for Medicare annual wellness exam      2. Hypertension goal BP (blood pressure) < 140/90  At goal on meds    3. Estrogen deficiency  Pt to schedule  - DX Hip/Pelvis/Spine; Future    COUNSELING:  Reviewed preventive health counseling, as reflected in patient instructions       Regular exercise       Healthy diet/nutrition       Vision screening       Dental care    Estimated body mass index is 22.3 kg/m  as calculated from the following:    Height as of this encounter: 1.753 m (5' 9\").    Weight as of this encounter: 68.5 kg (151 lb).         reports that she quit smoking about 42 years ago. She has never used smokeless tobacco.      Appropriate preventive services were discussed with this patient, including " applicable screening as appropriate for cardiovascular disease, diabetes, osteopenia/osteoporosis, and glaucoma.  As appropriate for age/gender, discussed screening for colorectal cancer, prostate cancer, breast cancer, and cervical cancer. Checklist reviewing preventive services available has been given to the patient.    Reviewed patients plan of care and provided an AVS. The Intermediate Care Plan ( asthma action plan, low back pain action plan, and migraine action plan) for Florencia meets the Care Plan requirement. This Care Plan has been established and reviewed with the Patient.    Counseling Resources:  ATP IV Guidelines  Pooled Cohorts Equation Calculator  Breast Cancer Risk Calculator  FRAX Risk Assessment  ICSI Preventive Guidelines  Dietary Guidelines for Americans, 2010  USDA's MyPlate  ASA Prophylaxis  Lung CA Screening    Shanel Hartman MD  [facility]

## 2019-11-19 NOTE — PATIENT INSTRUCTIONS
Patient Education   Personalized Prevention Plan  You are due for the preventive services outlined below.  Your care team is available to assist you in scheduling these services.  If you have already completed any of these items, please share that information with your care team to update in your medical record.  Health Maintenance Due   Topic Date Due     Zoster (Shingles) Vaccine (1 of 2) 08/19/2002     Annual Wellness Visit  08/19/2017     PHQ-2  01/01/2019     FALL RISK ASSESSMENT  03/08/2019     Pneumococcal Vaccine (2 of 2 - PPSV23) 12/03/2019

## 2019-12-18 ENCOUNTER — ANCILLARY PROCEDURE (OUTPATIENT)
Dept: BONE DENSITY | Facility: CLINIC | Age: 69
End: 2019-12-18
Attending: FAMILY MEDICINE
Payer: COMMERCIAL

## 2019-12-18 DIAGNOSIS — E28.39 ESTROGEN DEFICIENCY: ICD-10-CM

## 2019-12-18 PROCEDURE — 77080 DXA BONE DENSITY AXIAL: CPT | Performed by: INTERNAL MEDICINE

## 2020-01-09 ENCOUNTER — TRANSFERRED RECORDS (OUTPATIENT)
Dept: HEALTH INFORMATION MANAGEMENT | Facility: CLINIC | Age: 70
End: 2020-01-09

## 2020-03-03 NOTE — PROGRESS NOTES
".Subjective     Florencia Ross is a 69 year old female who presents to clinic today for the following health issues:    HPI   Mole check on right upper arm    Pt with seb keratosis on right upper arm. It is itchy and bugging her  She would like it frozen off.      Pt with HTN, well controlled on medication  Would like refil since here, due for labwork    Declines pneumonia shot today        Reviewed and updated as needed this visit by Provider         Review of Systems   ROS COMP: Constitutional, HEENT, cardiovascular, pulmonary, gi and gu systems are negative, except as otherwise noted.      Objective    BP (!) 150/82   Pulse 65   Temp 98.7  F (37.1  C) (Oral)   Ht 1.753 m (5' 9\")   Wt 69.4 kg (153 lb)   BMI 22.59 kg/m    Body mass index is 22.59 kg/m .  Physical Exam   GENERAL: healthy, alert and no distress  NECK: no adenopathy, no asymmetry, masses, or scars and thyroid normal to palpation  RESP: lungs clear to auscultation - no rales, rhonchi or wheezes  CV: regular rate and rhythm, normal S1 S2, no S3 or S4, no murmur, click or rub, no peripheral edema and peripheral pulses strong  MS: no gross musculoskeletal defects noted, no edema  SKIN:lesion on right upper lateral arm consistent with seb keratosis. This was frozen x3 with liquid nitrogen. Pt tolerated procedure well. No complication    Diagnostic Test Results:  Labs reviewed in Epic        Assessment & Plan     1. Inflamed seborrheic keratosis  Treated x3 with liquid nitrogen, follow-up if not completely resolved    2. Hypertension goal BP (blood pressure) < 140/90  Slightly elevated today. Pt to monitor at home and call if continues to be high, follow-up in 6 months  - triamterene-HCTZ (MAXZIDE-25) 37.5-25 MG tablet; Take 1 tablet by mouth daily  Dispense: 90 tablet; Refill: 1  - Basic metabolic panel    3. Need for pneumococcal vaccine  Declined at this time             Return in about 6 months (around 9/5/2020) for BP Recheck.    Shanel Hartman, " MD  [facility]

## 2020-03-05 ENCOUNTER — OFFICE VISIT (OUTPATIENT)
Dept: FAMILY MEDICINE | Facility: CLINIC | Age: 70
End: 2020-03-05
Payer: COMMERCIAL

## 2020-03-05 VITALS
DIASTOLIC BLOOD PRESSURE: 82 MMHG | TEMPERATURE: 98.7 F | SYSTOLIC BLOOD PRESSURE: 150 MMHG | WEIGHT: 153 LBS | HEIGHT: 69 IN | BODY MASS INDEX: 22.66 KG/M2 | HEART RATE: 65 BPM

## 2020-03-05 DIAGNOSIS — I10 HYPERTENSION GOAL BP (BLOOD PRESSURE) < 140/90: ICD-10-CM

## 2020-03-05 DIAGNOSIS — L82.0 INFLAMED SEBORRHEIC KERATOSIS: Primary | ICD-10-CM

## 2020-03-05 DIAGNOSIS — Z23 NEED FOR PNEUMOCOCCAL VACCINE: ICD-10-CM

## 2020-03-05 LAB
ANION GAP SERPL CALCULATED.3IONS-SCNC: 4 MMOL/L (ref 3–14)
BUN SERPL-MCNC: 10 MG/DL (ref 7–30)
CALCIUM SERPL-MCNC: 9.9 MG/DL (ref 8.5–10.1)
CHLORIDE SERPL-SCNC: 99 MMOL/L (ref 94–109)
CO2 SERPL-SCNC: 35 MMOL/L (ref 20–32)
CREAT SERPL-MCNC: 0.69 MG/DL (ref 0.52–1.04)
GFR SERPL CREATININE-BSD FRML MDRD: 90 ML/MIN/{1.73_M2}
GLUCOSE SERPL-MCNC: 79 MG/DL (ref 70–99)
POTASSIUM SERPL-SCNC: 3.6 MMOL/L (ref 3.4–5.3)
SODIUM SERPL-SCNC: 138 MMOL/L (ref 133–144)

## 2020-03-05 PROCEDURE — 17110 DESTRUCTION B9 LES UP TO 14: CPT | Performed by: FAMILY MEDICINE

## 2020-03-05 PROCEDURE — 36415 COLL VENOUS BLD VENIPUNCTURE: CPT | Performed by: FAMILY MEDICINE

## 2020-03-05 PROCEDURE — 99213 OFFICE O/P EST LOW 20 MIN: CPT | Mod: 25 | Performed by: FAMILY MEDICINE

## 2020-03-05 PROCEDURE — 80048 BASIC METABOLIC PNL TOTAL CA: CPT | Performed by: FAMILY MEDICINE

## 2020-03-05 RX ORDER — TRIAMTERENE/HYDROCHLOROTHIAZID 37.5-25 MG
1 TABLET ORAL DAILY
Qty: 90 TABLET | Refills: 1 | Status: SHIPPED | OUTPATIENT
Start: 2020-03-05 | End: 2020-11-04

## 2020-03-05 ASSESSMENT — MIFFLIN-ST. JEOR: SCORE: 1293.38

## 2020-03-12 ENCOUNTER — OFFICE VISIT (OUTPATIENT)
Dept: OPHTHALMOLOGY | Facility: CLINIC | Age: 70
End: 2020-03-12
Payer: COMMERCIAL

## 2020-03-12 DIAGNOSIS — H52.03 HYPEROPIA OF BOTH EYES: ICD-10-CM

## 2020-03-12 DIAGNOSIS — Z01.00 EXAMINATION OF EYES AND VISION: Primary | ICD-10-CM

## 2020-03-12 DIAGNOSIS — H52.4 PRESBYOPIA: ICD-10-CM

## 2020-03-12 DIAGNOSIS — H52.223 REGULAR ASTIGMATISM OF BOTH EYES: ICD-10-CM

## 2020-03-12 DIAGNOSIS — H02.831 DERMATOCHALASIS OF BOTH UPPER EYELIDS: ICD-10-CM

## 2020-03-12 DIAGNOSIS — H25.13 NUCLEAR SCLEROTIC CATARACT OF BOTH EYES: ICD-10-CM

## 2020-03-12 DIAGNOSIS — H02.834 DERMATOCHALASIS OF BOTH UPPER EYELIDS: ICD-10-CM

## 2020-03-12 PROCEDURE — 92015 DETERMINE REFRACTIVE STATE: CPT | Performed by: STUDENT IN AN ORGANIZED HEALTH CARE EDUCATION/TRAINING PROGRAM

## 2020-03-12 PROCEDURE — 92004 COMPRE OPH EXAM NEW PT 1/>: CPT | Performed by: STUDENT IN AN ORGANIZED HEALTH CARE EDUCATION/TRAINING PROGRAM

## 2020-03-12 ASSESSMENT — VISUAL ACUITY
OS_CC: 20/20
OD_CC: 20/20
METHOD: SNELLEN - LINEAR
OD_CC+: -1
CORRECTION_TYPE: GLASSES
OS_CC+: -1

## 2020-03-12 ASSESSMENT — REFRACTION_MANIFEST
OS_ADD: +2.50
OS_CYLINDER: +0.25
OS_SPHERE: +1.75
OD_CYLINDER: +0.75
OD_ADD: +2.50
OD_SPHERE: +2.00
OS_AXIS: 179
OD_AXIS: 007

## 2020-03-12 ASSESSMENT — TONOMETRY
OD_IOP_MMHG: 17
OS_IOP_MMHG: 18
IOP_METHOD: APPLANATION

## 2020-03-12 ASSESSMENT — REFRACTION_WEARINGRX
OD_AXIS: 005
OD_CYLINDER: +0.50
OS_ADD: +2.25
SPECS_TYPE: PAL
OS_SPHERE: +2.00
OS_CYLINDER: SPHERE
OD_SPHERE: +2.00
OD_ADD: +2.25

## 2020-03-12 ASSESSMENT — SLIT LAMP EXAM - LIDS
COMMENTS: 2+ DERMATOCHALASIS
COMMENTS: 2+ DERMATOCHALASIS

## 2020-03-12 ASSESSMENT — CUP TO DISC RATIO
OS_RATIO: 0.6
OD_RATIO: 0.6

## 2020-03-12 ASSESSMENT — EXTERNAL EXAM - LEFT EYE: OS_EXAM: NORMAL

## 2020-03-12 ASSESSMENT — EXTERNAL EXAM - RIGHT EYE: OD_EXAM: NORMAL

## 2020-03-12 ASSESSMENT — CONF VISUAL FIELD
OS_NORMAL: 1
OD_NORMAL: 1

## 2020-03-12 NOTE — PROGRESS NOTES
Current Eye Medications:  none     Subjective: here for complete today. Having trouble with sewing. She does projects off and on.      Objective:  See Ophthalmology Exam.       Assessment:  Florencia Ross is a 69 year old female who presents with:   Encounter Diagnoses   Name Primary?     Examination of eyes and vision      Hyperopia of both eyes      Regular astigmatism of both eyes      Presbyopia        Nuclear sclerotic cataract of both eyes Not visually significant      Dermatochalasis of both upper eyelids        Plan:  Glasses prescription given     Brenda Apple MD  [phone]

## 2020-03-12 NOTE — LETTER
3/12/2020         RE: Florencia Ross  [address] [address]        Dear Colleague,    Thank you for referring your patient, Florencia Ross, to the [facility]. Please see a copy of my visit note below.     Current Eye Medications:  none     Subjective: here for complete today. Having trouble with sewing. She does projects off and on.      Objective:  See Ophthalmology Exam.       Assessment:  Florencia Ross is a 69 year old female who presents with:   Encounter Diagnoses   Name Primary?     Examination of eyes and vision      Hyperopia of both eyes      Regular astigmatism of both eyes      Presbyopia        Nuclear sclerotic cataract of both eyes Not visually significant      Dermatochalasis of both upper eyelids        Plan:  Glasses prescription given     Brenda Apple MD  [phone]        Again, thank you for allowing me to participate in the care of your patient.        Sincerely,        Brenda Apple MD

## 2020-03-27 DIAGNOSIS — A60.00 RECURRENT GENITAL HERPES: ICD-10-CM

## 2020-03-27 RX ORDER — ACYCLOVIR 400 MG/1
400 TABLET ORAL 2 TIMES DAILY
Qty: 180 TABLET | Refills: 0 | Status: SHIPPED | OUTPATIENT
Start: 2020-03-27 | End: 2020-06-19

## 2020-03-27 NOTE — TELEPHONE ENCOUNTER
"Requested Prescriptions   Signed Prescriptions Disp Refills    acyclovir (ZOVIRAX) 400 MG tablet 180 tablet 0     Sig: TAKE 1 TABLET (400 MG) BY MOUTH 2 TIMES DAILY       Antivirals for Herpes Protocol Passed - 3/27/2020 12:10 PM        Passed - Patient is age 12 or older        Passed - Recent (12 mo) or future (30 days) visit within the authorizing provider's specialty     Patient has had an office visit with the authorizing provider or a provider within the authorizing providers department within the previous 12 mos or has a future within next 30 days. See \"Patient Info\" tab in inbasket, or \"Choose Columns\" in Meds & Orders section of the refill encounter.              Passed - Medication is active on med list        Passed - Normal serum creatinine on file in past 12 months     Recent Labs   Lab Test 03/05/20  1208   CR 0.69       Ok to refill medication if creatinine is low               "

## 2020-06-02 ENCOUNTER — TRANSFERRED RECORDS (OUTPATIENT)
Dept: HEALTH INFORMATION MANAGEMENT | Facility: CLINIC | Age: 70
End: 2020-06-02

## 2020-06-10 ENCOUNTER — E-VISIT (OUTPATIENT)
Dept: FAMILY MEDICINE | Facility: CLINIC | Age: 70
End: 2020-06-10
Payer: COMMERCIAL

## 2020-06-10 ENCOUNTER — TELEPHONE (OUTPATIENT)
Dept: FAMILY MEDICINE | Facility: CLINIC | Age: 70
End: 2020-06-10

## 2020-06-10 DIAGNOSIS — R30.0 DYSURIA: ICD-10-CM

## 2020-06-10 DIAGNOSIS — N30.01 ACUTE CYSTITIS WITH HEMATURIA: ICD-10-CM

## 2020-06-10 PROCEDURE — 99421 OL DIG E/M SVC 5-10 MIN: CPT | Performed by: FAMILY MEDICINE

## 2020-06-10 RX ORDER — CEPHALEXIN 500 MG/1
500 CAPSULE ORAL 2 TIMES DAILY
Qty: 14 CAPSULE | Refills: 0 | Status: SHIPPED | OUTPATIENT
Start: 2020-06-10 | End: 2020-11-25

## 2020-06-10 NOTE — TELEPHONE ENCOUNTER
Would you like her to do an E-visit or go to an urgent care of out town?    Arielle Matute BSN, RN

## 2020-06-10 NOTE — TELEPHONE ENCOUNTER
Patient is out of town and she feels like she is getting a UTI and is wondering if you would be able to send an RX for this to [facility] pharmacy in [place], [place].  Please call to advise.  Thank you

## 2020-06-10 NOTE — TELEPHONE ENCOUNTER
Called patient and gave providers message/ instructions.  Patient states (s)he understands this.   Gave her the [email or web address] Helpline number in case she needs to reset her password.   Also sent her instructions for an E-visit through her [email or web address].       Arielle MELON, RN

## 2020-06-19 DIAGNOSIS — A60.00 RECURRENT GENITAL HERPES: ICD-10-CM

## 2020-06-19 RX ORDER — ACYCLOVIR 400 MG/1
TABLET ORAL
Qty: 180 TABLET | Refills: 0 | Status: SHIPPED | OUTPATIENT
Start: 2020-06-19 | End: 2020-09-14

## 2020-07-08 NOTE — ANESTHESIA POSTPROCEDURE EVALUATION
Anesthesia POST Procedure Evaluation    Patient: Florencia Ross   MRN:     [id number] Gender:   female   Age:    68 year old :      1950        Preoperative Diagnosis: Right ureteral stone   Procedure(s):  COMBINED CYSTOSCOPY, URETEROSCOPY, BASKET REMOVAL OF LESION  CYSTOSCOPY, WITH URETERAL STENT INSERTION   Postop Comments: No value filed.       Anesthesia Type:  Not documented  General    Reportable Event: NO     PAIN: Uncomplicated   Sign Out status: Comfortable, Well controlled pain     PONV: No PONV   Sign Out status:  No Nausea or Vomiting     Neuro/Psych: Uneventful perioperative course   Sign Out Status: Preoperative baseline; Age appropriate mentation     Airway/Resp.: Uneventful perioperative course   Sign Out Status: Non labored breathing, age appropriate RR; Resp. Status within EXPECTED Parameters     CV: Uneventful perioperative course   Sign Out status: Appropriate BP and perfusion indices; Appropriate HR/Rhythm     Disposition:   Sign Out in:  PACU  Disposition:  Phase II; Home  Recovery Course: Uneventful  Follow-Up: Not required           Last Anesthesia Record Vitals:  CRNA VITALS  2019 1440 - 2019 1540      2019             Pulse:  72    SpO2:  90 %          Last PACU Vitals:  Vitals Value Taken Time   /74 2019  3:15 PM   Temp 36  C (96.8  F) 2019  3:07 PM   Pulse 61 2019  3:15 PM   Resp 16 2019  3:15 PM   SpO2 97 % 2019  3:15 PM   Temp src     NIBP     Pulse 72 2019  3:06 PM   SpO2 90 % 2019  3:06 PM   Resp     Temp     Ht Rate     Temp 2           Electronically Signed By: Tariq You MD, 2019, 3:43 PM   11

## 2020-08-21 NOTE — PROGRESS NOTES
"SUBJECTIVE:  Florencia Ross, a 67 year old female scheduled an appointment to discuss the following issues:  Hypertension, unspecified type    Medical, social, surgical, and family histories reviewed.    Hypertension  When waking up this morning had headache and doesn't feel good, she took her BP and 178/92 and then checked again and was 183/96. She feels cold. She does take BP medication daily.       As above.  Pt has taken Triamterene/HCTZ for 20 years.  She consumes 1-2 glasses of wine per night.  Takes 1 baby aspirin per day.  Pt has just had Cortisone injection in her knee last Wednesday (4 days ago).  Pt has headache 2/10 intensity, and some ringing in her ears. No change in speech or hearing.  No paresthesia.    ROS:  See HPI.  No nausea/vomiting.  No fever/chills.  No chest pain/SOB.  No BM/urine problems.  No dizziness or syncope.      OBJECTIVE:  /90 (BP Location: Right arm, Patient Position: Sitting, Cuff Size: Adult Regular)  Pulse 71  Temp 97.8  F (36.6  C) (Oral)  Ht 5' 8.86\" (1.749 m)  Wt 156 lb 6 oz (70.9 kg)  SpO2 99%  BMI 23.19 kg/m2  EXAM:  Recheck BP was 180/60  GENERAL APPEARANCE: alert and no distress; no cyanosis or accessory muscle use; moist mucus membrane  EYES: Eyes grossly normal to inspection, PERRL and conjunctivae and sclerae normal  HENT: ear canals and TM's normal and nose and mouth without ulcers or lesions  NECK: no adenopathy, no asymmetry, masses, or scars and thyroid normal to palpation  RESP: lungs clear to auscultation - no rales, rhonchi or wheezes  CV: regular rates and rhythm, normal S1 S2, no S3 or S4 and no murmur, click or rub  LYMPHATICS: no cervical adenopathy  ABDOMEN: soft, nontender, without hepatosplenomegaly or masses and bowel sounds normal  MS: extremities normal- no gross deformities noted; knees--no effusion or redness, normal ROM  SKIN: no suspicious lesions or rashes  NEURO: Normal strength and tone, mentation intact and speech normal; no facial " asymmetry; no pronator drift, no focal neurological deficits    ASSESSMENT/PLAN:  (I10) Hypertension, unspecified type  (primary encounter diagnosis)  Comment:  Could be side effects of the Cortisone; no neurological deficits  Plan: Observation at this time. If headache worsens, especially with systemic symptoms---pt must present to ER ASAP. F/up PCP regarding possible need of adjustment of BP meds.  Warning signs and symptoms explained---be seen ASAP if worsening.       Encephalopathy

## 2020-09-11 DIAGNOSIS — A60.00 RECURRENT GENITAL HERPES: ICD-10-CM

## 2020-09-14 RX ORDER — ACYCLOVIR 400 MG/1
TABLET ORAL
Qty: 180 TABLET | Refills: 0 | Status: SHIPPED | OUTPATIENT
Start: 2020-09-14 | End: 2020-12-17

## 2020-10-09 DIAGNOSIS — Z20.822 SUSPECTED 2019 NOVEL CORONAVIRUS INFECTION: Primary | ICD-10-CM

## 2020-10-09 PROCEDURE — U0003 INFECTIOUS AGENT DETECTION BY NUCLEIC ACID (DNA OR RNA); SEVERE ACUTE RESPIRATORY SYNDROME CORONAVIRUS 2 (SARS-COV-2) (CORONAVIRUS DISEASE [COVID-19]), AMPLIFIED PROBE TECHNIQUE, MAKING USE OF HIGH THROUGHPUT TECHNOLOGIES AS DESCRIBED BY CMS-2020-01-R: HCPCS | Performed by: FAMILY MEDICINE

## 2020-10-10 LAB
SARS-COV-2 RNA SPEC QL NAA+PROBE: NOT DETECTED
SPECIMEN SOURCE: NORMAL

## 2020-10-27 ENCOUNTER — TRANSFERRED RECORDS (OUTPATIENT)
Dept: HEALTH INFORMATION MANAGEMENT | Facility: CLINIC | Age: 70
End: 2020-10-27

## 2020-11-04 DIAGNOSIS — I10 HYPERTENSION GOAL BP (BLOOD PRESSURE) < 140/90: ICD-10-CM

## 2020-11-04 RX ORDER — TRIAMTERENE/HYDROCHLOROTHIAZID 37.5-25 MG
TABLET ORAL
Qty: 90 TABLET | Refills: 1 | Status: SHIPPED | OUTPATIENT
Start: 2020-11-04 | End: 2020-12-12

## 2020-11-04 NOTE — TELEPHONE ENCOUNTER
"Routing refill request to provider for review/approval because:  Failed protocol.  No future appointment scheduled. Please advise. Thank you. Leona Suazo R.N.  Requested Prescriptions   Pending Prescriptions Disp Refills    triamterene-HCTZ (MAXZIDE-25) 37.5-25 MG tablet [Pharmacy Med Name: TRIAMTERENE-HCTZ 37.5-25 MG TB] 90 tablet 1     Sig: TAKE 1 TABLET BY MOUTH EVERY DAY       Diuretics (Including Combos) Protocol Failed - 11/4/2020  3:33 AM        Failed - Blood pressure under 140/90 in past 12 months     BP Readings from Last 3 Encounters:   03/05/20 (!) 150/82   11/19/19 129/73   08/01/19 (!) 158/94                 Passed - Recent (12 mo) or future (30 days) visit within the authorizing provider's specialty     Patient has had an office visit with the authorizing provider or a provider within the authorizing providers department within the previous 12 mos or has a future within next 30 days. See \"Patient Info\" tab in inbasket, or \"Choose Columns\" in Meds & Orders section of the refill encounter.              Passed - Medication is active on med list        Passed - Patient is age 18 or older        Passed - No active pregancy on record        Passed - Normal serum creatinine on file in past 12 months     Recent Labs   Lab Test 03/05/20  1208   CR 0.69              Passed - Normal serum potassium on file in past 12 months     Recent Labs   Lab Test 03/05/20  1208   POTASSIUM 3.6                    Passed - Normal serum sodium on file in past 12 months     Recent Labs   Lab Test 03/05/20  1208                 Passed - No positive pregnancy test in past 12 months                   "

## 2020-11-25 ENCOUNTER — TELEPHONE (OUTPATIENT)
Dept: FAMILY MEDICINE | Facility: CLINIC | Age: 70
End: 2020-11-25

## 2020-11-25 ENCOUNTER — E-VISIT (OUTPATIENT)
Dept: FAMILY MEDICINE | Facility: CLINIC | Age: 70
End: 2020-11-25
Payer: COMMERCIAL

## 2020-11-25 DIAGNOSIS — N30.01 ACUTE CYSTITIS WITH HEMATURIA: ICD-10-CM

## 2020-11-25 DIAGNOSIS — R30.0 DYSURIA: Primary | ICD-10-CM

## 2020-11-25 DIAGNOSIS — R30.0 DYSURIA: ICD-10-CM

## 2020-11-25 PROCEDURE — 99421 OL DIG E/M SVC 5-10 MIN: CPT | Performed by: FAMILY MEDICINE

## 2020-11-25 RX ORDER — CEPHALEXIN 500 MG/1
500 CAPSULE ORAL 2 TIMES DAILY
Qty: 14 CAPSULE | Refills: 0 | OUTPATIENT
Start: 2020-11-25

## 2020-11-25 NOTE — TELEPHONE ENCOUNTER
Called patient and informed her she needs to give a urine if she wants to be treated for a UTI.  Patient will come in to see lab to give a urine sample.     Arielle MELON, RN

## 2020-11-25 NOTE — TELEPHONE ENCOUNTER
Patient had an E-Visit this morning for an UTI.  She does not want to come into the clinic due to COVID and would like to discuss other options.  Please call to advise.  Thank you

## 2020-11-25 NOTE — TELEPHONE ENCOUNTER
Reason for Call:  Other prescription and UTI     Detailed comments: patient has another UTI wondering if pcp would send in a script to the pharmacy   Please call to advice  Thank you    Phone Number Patient can be reached at: Home number on file [phone] (home)    Best Time: any    Can we leave a detailed message on this number? YES    Call taken on 11/25/2020 at 9:24 AM by Maya Hernández

## 2020-11-25 NOTE — TELEPHONE ENCOUNTER
Patient is already doing an E-visit for UTI today and has been told she needs to do a UA in order to be treated.   Declined refill of medication.    Arielle Matute BSN, RN

## 2020-11-30 ENCOUNTER — OFFICE VISIT (OUTPATIENT)
Dept: FAMILY MEDICINE | Facility: CLINIC | Age: 70
End: 2020-11-30
Payer: COMMERCIAL

## 2020-11-30 VITALS
HEIGHT: 69 IN | OXYGEN SATURATION: 98 % | SYSTOLIC BLOOD PRESSURE: 157 MMHG | TEMPERATURE: 97.6 F | BODY MASS INDEX: 23.31 KG/M2 | RESPIRATION RATE: 18 BRPM | WEIGHT: 157.38 LBS | DIASTOLIC BLOOD PRESSURE: 89 MMHG | HEART RATE: 67 BPM

## 2020-11-30 DIAGNOSIS — N30.01 ACUTE CYSTITIS WITH HEMATURIA: ICD-10-CM

## 2020-11-30 DIAGNOSIS — R35.0 URINARY FREQUENCY: Primary | ICD-10-CM

## 2020-11-30 DIAGNOSIS — R82.90 NONSPECIFIC FINDING ON EXAMINATION OF URINE: ICD-10-CM

## 2020-11-30 DIAGNOSIS — N20.0 NEPHROLITHIASIS: ICD-10-CM

## 2020-11-30 LAB
ALBUMIN UR-MCNC: 30 MG/DL
APPEARANCE UR: ABNORMAL
BACTERIA #/AREA URNS HPF: ABNORMAL /HPF
BILIRUB UR QL STRIP: NEGATIVE
COLOR UR AUTO: YELLOW
GLUCOSE UR STRIP-MCNC: NEGATIVE MG/DL
HGB UR QL STRIP: ABNORMAL
KETONES UR STRIP-MCNC: NEGATIVE MG/DL
LEUKOCYTE ESTERASE UR QL STRIP: ABNORMAL
NITRATE UR QL: NEGATIVE
PH UR STRIP: 8 PH (ref 5–7)
RBC #/AREA URNS AUTO: ABNORMAL /HPF
SOURCE: ABNORMAL
SP GR UR STRIP: 1.02 (ref 1–1.03)
UROBILINOGEN UR STRIP-ACNC: 0.2 EU/DL (ref 0.2–1)
WBC #/AREA URNS AUTO: >100 /HPF

## 2020-11-30 PROCEDURE — 81001 URINALYSIS AUTO W/SCOPE: CPT | Performed by: PHYSICIAN ASSISTANT

## 2020-11-30 PROCEDURE — 87086 URINE CULTURE/COLONY COUNT: CPT | Performed by: PHYSICIAN ASSISTANT

## 2020-11-30 PROCEDURE — 99214 OFFICE O/P EST MOD 30 MIN: CPT | Performed by: PHYSICIAN ASSISTANT

## 2020-11-30 RX ORDER — SULFAMETHOXAZOLE/TRIMETHOPRIM 800-160 MG
1 TABLET ORAL 2 TIMES DAILY
Qty: 10 TABLET | Refills: 0 | Status: SHIPPED | OUTPATIENT
Start: 2020-11-30 | End: 2020-12-05

## 2020-11-30 RX ORDER — CIPROFLOXACIN 500 MG/1
500 TABLET, FILM COATED ORAL 2 TIMES DAILY
Qty: 10 TABLET | Refills: 0 | Status: CANCELLED | OUTPATIENT
Start: 2020-11-30 | End: 2020-12-05

## 2020-11-30 ASSESSMENT — MIFFLIN-ST. JEOR: SCORE: 1308.23

## 2020-11-30 NOTE — PROGRESS NOTES
"Subjective     Florencia Ross is a 70 year old female who presents to clinic today for the following health issues:    HPI         Genitourinary - Female  Onset/Duration: 6 days - improved for a couple days but there have been some symptoms lingering  Believes she passed a stone last Wednesday (saw in toilet)  Description:   Painful urination (Dysuria): YES           Frequency: YES  Blood in urine (Hematuria): YES - saw last week, hasn't seen this week  Delay in urine (Hesitency): no  Intensity: moderate  Progression of Symptoms:  intermittent  Accompanying Signs & Symptoms:  Fever/chills: no  Flank pain: YES, bilat  Nausea and vomiting: no  Vaginal symptoms: itching  Abdominal/Pelvic Pain: no  History:   History of frequent UTI s: YES - maybe 1-2/yr   History of kidney stones: YES - 1 removed in 8/2019  Sexually Active: no  Possibility of pregnancy: No    Therapies tried and outcome: Cranberry juice prn (contraindicated in Coumadin patients) and OTC advil or tylenol     States she saw a stone pass  Mild upper back pain bilat      Review of Systems   Constitutional, HEENT, cardiovascular, pulmonary, gi and gu systems are negative, except as otherwise noted.      Objective    BP (!) 157/89   Pulse 67   Temp 97.6  F (36.4  C) (Tympanic)   Resp 18   Ht 1.753 m (5' 9\")   Wt 71.4 kg (157 lb 6 oz)   LMP  (LMP Unknown)   SpO2 98%   Breastfeeding No   BMI 23.24 kg/m    Body mass index is 23.24 kg/m .  Physical Exam   GENERAL: healthy, alert and no distress  ABDOMEN: tenderness RUQ, LUQ and suprapubic and mild CVA tenderness bilat  MS: no gross musculoskeletal defects noted, no edema  SKIN: no suspicious lesions or rashes  NEURO: Normal strength and tone, mentation intact and speech normal  PSYCH: mentation appears normal, affect normal/bright    Results for orders placed or performed in visit on 11/30/20 (from the past 24 hour(s))   UA reflex to Microscopic and Culture    Specimen: Midstream Urine   Result Value Ref " Range    Color Urine Yellow     Appearance Urine Slightly Cloudy     Glucose Urine Negative NEG^Negative mg/dL    Bilirubin Urine Negative NEG^Negative    Ketones Urine Negative NEG^Negative mg/dL    Specific Gravity Urine 1.020 1.003 - 1.035    Blood Urine Large (A) NEG^Negative    pH Urine 8.0 (H) 5.0 - 7.0 pH    Protein Albumin Urine 30 (A) NEG^Negative mg/dL    Urobilinogen Urine 0.2 0.2 - 1.0 EU/dL    Nitrite Urine Negative NEG^Negative    Leukocyte Esterase Urine Large (A) NEG^Negative    Source Midstream Urine    Urine Microscopic   Result Value Ref Range    WBC Urine >100 (A) OTO5^0 - 5 /HPF    RBC Urine 5-10 (A) OTO2^O - 2 /HPF    Bacteria Urine Few (A) NEG^Negative /HPF           Assessment & Plan     1. Urinary frequency    2. Nonspecific finding on examination of urine    3. Acute cystitis with hematuria    4. Nephrolithiasis        1-3) UA appears positive for UTI. UC in process. Bactrim DS BID x5 days. Supportive therapy also discussed. Follow up if symptoms fail to improve or worsen. We also discussed common causes for UTI.     4) patient recently passed a stone. Symptoms have been improving. If symptoms worsen again may need to repeat a CT, stone protocol.         Return in about 1 week (around 12/7/2020), or if symptoms worsen or fail to improve.    Lai Weems PA-C  [facility]

## 2020-12-01 LAB
BACTERIA SPEC CULT: NO GROWTH
Lab: NORMAL
SPECIMEN SOURCE: NORMAL

## 2020-12-10 NOTE — PROGRESS NOTES
"  SUBJECTIVE:   Florencia Ross is a 70 year old female who presents for Preventive Visit.    {Split Bill scripting  The purpose of this visit is to discuss your medical history and prevent health problems before you are sick. You may be responsible for a co-pay, coinsurance, or deductible if your visit today includes services such as checking on a sore throat, having an x-ray or lab test, or treating and evaluating a new or existing condition :821268}  Patient has been advised of split billing requirements and indicates understanding: {Yes and No:144732}  Are you in the first 12 months of your Medicare Part B coverage?  { :127145::\"No\"}    Physical Health:    In general, how would you rate your overall physical health? { :249679}    Outside of work, how many days during the week do you exercise? { :494721}    Outside of work, approximately how many minutes a day do you exercise?{ :132918}    If you drink alcohol do you typically have >3 drinks per day or >7 drinks per week? { :009281}    Do you usually eat at least 4 servings of fruit and vegetables a day, include whole grains & fiber and avoid regularly eating high fat or \"junk\" foods? { :796966::\"Yes\"}    Do you have any problems taking medications regularly?  { :948362::\"No\"}    Do you have any side effects from medications? { :024418}    Needs assistance for the following daily activities: { :459311}    Which of the following safety concerns are present in your home?  { :823675::\"none identified\"}     Hearing impairment: { :979719}    In the past 6 months, have you been bothered by leaking of urine? { :244492}    Mental Health:    In general, how would you rate your overall mental or emotional health? { :236509}  PHQ-2 Score:      Do you feel safe in your environment? { :472655}    Have you ever done Advance Care Planning? (For example, a Health Directive, POLST, or a discussion with a medical provider or your loved ones about your wishes): { " ":188585}    Additional concerns to address?  {If YES, notify patient they may be responsible for a copay, coinsurance or deductible if the visit today includes services such as checking on a sore throat, having an x-ray or lab test, or treating and evaluating a new or existing condition :776927::\"No\"}    Fall risk:  { :650958}  {If any of the above assessments are answered yes, consider ordering appropriate referrals (Optional):453035::\"click delete button to remove this line now\"}  Cognitive Screening: { :156928}    {Do you have sleep apnea, excessive snoring or daytime drowsiness? (Optional):417510}    {Outside tests to abstract? :406000}    {additional problems to add (Optional):536597}    Reviewed and updated as needed this visit by clinical staff                 Reviewed and updated as needed this visit by Provider                Social History     Tobacco Use     Smoking status: Former Smoker     Quit date: 1977     Years since quittin.4     Smokeless tobacco: Never Used   Substance Use Topics     Alcohol use: Yes     Comment: 5-7 drinks a week                           Current providers sharing in care for this patient include: {Rooming staff:  Please update Care Team in Rooming Activity, refresh this note and then delete this statement}  Patient Care Team:  Shanel Aguiar MD as PCP - General (Family Practice)  Shanel Aguiar MD as Assigned PCP  Ramiro Espitia MD as Assigned Surgical Provider    The following health maintenance items are reviewed in Epic and correct as of today:  Health Maintenance   Topic Date Due     ZOSTER IMMUNIZATION (1 of 2) 2002     Pneumococcal Vaccine: 65+ Years (2 of 2 - PPSV23) 2019     LIPID  2020     MEDICARE ANNUAL WELLNESS VISIT  2020     BMP  2021     FALL RISK ASSESSMENT  2021     MAMMO SCREENING  2022     COLORECTAL CANCER SCREENING  2024     ADVANCE CARE PLANNING  2024     DTAP/TDAP/TD " "IMMUNIZATION (2 - Td) 2026     DEXA  2034     HEPATITIS C SCREENING  Completed     PHQ-2  Completed     INFLUENZA VACCINE  Completed     Pneumococcal Vaccine: Pediatrics (0 to 5 Years) and At-Risk Patients (6 to 64 Years)  Aged Out     IPV IMMUNIZATION  Aged Out     MENINGITIS IMMUNIZATION  Aged Out     HEPATITIS B IMMUNIZATION  Aged Out     {Chronicprobdata (Optional):969312}  {Decision Support (Optional):928306}    ROS:  {ROS COMP:042880}    OBJECTIVE:   LMP  (LMP Unknown)  Estimated body mass index is 23.24 kg/m  as calculated from the following:    Height as of 20: 1.753 m (5' 9\").    Weight as of 20: 71.4 kg (157 lb 6 oz).  EXAM:   {Exam :176760}    {Diagnostic Test Results (Optional):335578::\"Diagnostic Test Results:\",\"Labs reviewed in Epic\"}    ASSESSMENT / PLAN:   {Diag Picklist:038182}    Patient has been advised of split billing requirements and indicates understanding: {YES / NO:300995::\"Yes\"}    COUNSELING:  {Medicare Counselin}    Estimated body mass index is 23.24 kg/m  as calculated from the following:    Height as of 20: 1.753 m (5' 9\").    Weight as of 20: 71.4 kg (157 lb 6 oz).    {Weight Management Plan (ACO) Complete if BMI is abnormal-  Ages 18-64  BMI >24.9.  Age 65+ with BMI <23 or >30 (Optional):455970}    She reports that she quit smoking about 43 years ago. She has never used smokeless tobacco.    Appropriate preventive services were discussed with this patient, including applicable screening as appropriate for cardiovascular disease, diabetes, osteopenia/osteoporosis, and glaucoma.  As appropriate for age/gender, discussed screening for colorectal cancer, prostate cancer, breast cancer, and cervical cancer. Checklist reviewing preventive services available has been given to the patient.    Reviewed patients plan of care and provided an AVS. The {CarePlan:549498} for Florencia meets the Care Plan requirement. This Care Plan has been established and " reviewed with the {PATIENT, FAMILY MEMBER, CAREGIVER:933304}.    Counseling Resources:  ATP IV Guidelines  Pooled Cohorts Equation Calculator  Breast Cancer Risk Calculator  BRCA-Related Cancer Risk Assessment: FHS-7 Tool  FRAX Risk Assessment  ICSI Preventive Guidelines  Dietary Guidelines for Americans, 2010  USDA's MyPlate  ASA Prophylaxis  Lung CA Screening    Shanel Aguiar MD  [facility]

## 2020-12-10 NOTE — PATIENT INSTRUCTIONS
Patient Education   Personalized Prevention Plan  You are due for the preventive services outlined below.  Your care team is available to assist you in scheduling these services.  If you have already completed any of these items, please share that information with your care team to update in your medical record.  Health Maintenance Due   Topic Date Due     Zoster (Shingles) Vaccine (1 of 2) 08/19/2002     Pneumococcal Vaccine (2 of 2 - PPSV23) 12/03/2019     Cholesterol Lab  11/13/2020

## 2020-12-15 ENCOUNTER — OFFICE VISIT (OUTPATIENT)
Dept: FAMILY MEDICINE | Facility: CLINIC | Age: 70
End: 2020-12-15
Payer: COMMERCIAL

## 2020-12-15 VITALS
BODY MASS INDEX: 23.11 KG/M2 | HEART RATE: 67 BPM | SYSTOLIC BLOOD PRESSURE: 125 MMHG | HEIGHT: 69 IN | WEIGHT: 156 LBS | DIASTOLIC BLOOD PRESSURE: 83 MMHG | TEMPERATURE: 98.1 F

## 2020-12-15 DIAGNOSIS — I10 HYPERTENSION GOAL BP (BLOOD PRESSURE) < 140/90: ICD-10-CM

## 2020-12-15 DIAGNOSIS — Z23 NEED FOR PNEUMOCOCCAL VACCINE: ICD-10-CM

## 2020-12-15 DIAGNOSIS — H90.3 SENSORINEURAL HEARING LOSS (SNHL) OF BOTH EARS: ICD-10-CM

## 2020-12-15 DIAGNOSIS — Z00.00 ENCOUNTER FOR MEDICARE ANNUAL WELLNESS EXAM: Primary | ICD-10-CM

## 2020-12-15 LAB
ANION GAP SERPL CALCULATED.3IONS-SCNC: 2 MMOL/L (ref 3–14)
BUN SERPL-MCNC: 12 MG/DL (ref 7–30)
CALCIUM SERPL-MCNC: 10 MG/DL (ref 8.5–10.1)
CHLORIDE SERPL-SCNC: 103 MMOL/L (ref 94–109)
CHOLEST SERPL-MCNC: 211 MG/DL
CO2 SERPL-SCNC: 34 MMOL/L (ref 20–32)
CREAT SERPL-MCNC: 0.68 MG/DL (ref 0.52–1.04)
GFR SERPL CREATININE-BSD FRML MDRD: 90 ML/MIN/{1.73_M2}
GLUCOSE SERPL-MCNC: 89 MG/DL (ref 70–99)
HDLC SERPL-MCNC: 56 MG/DL
LDLC SERPL CALC-MCNC: 138 MG/DL
NONHDLC SERPL-MCNC: 155 MG/DL
POTASSIUM SERPL-SCNC: 3.9 MMOL/L (ref 3.4–5.3)
SODIUM SERPL-SCNC: 139 MMOL/L (ref 133–144)
TRIGL SERPL-MCNC: 84 MG/DL

## 2020-12-15 PROCEDURE — 80048 BASIC METABOLIC PNL TOTAL CA: CPT | Performed by: FAMILY MEDICINE

## 2020-12-15 PROCEDURE — 99397 PER PM REEVAL EST PAT 65+ YR: CPT | Performed by: FAMILY MEDICINE

## 2020-12-15 PROCEDURE — 36415 COLL VENOUS BLD VENIPUNCTURE: CPT | Performed by: FAMILY MEDICINE

## 2020-12-15 PROCEDURE — 80061 LIPID PANEL: CPT | Performed by: FAMILY MEDICINE

## 2020-12-15 PROCEDURE — 99213 OFFICE O/P EST LOW 20 MIN: CPT | Mod: 25 | Performed by: FAMILY MEDICINE

## 2020-12-15 RX ORDER — TRIAMTERENE/HYDROCHLOROTHIAZID 37.5-25 MG
1 TABLET ORAL DAILY
Qty: 90 TABLET | Refills: 3 | Status: SHIPPED | OUTPATIENT
Start: 2020-12-15 | End: 2021-01-08

## 2020-12-15 ASSESSMENT — ENCOUNTER SYMPTOMS
HEADACHES: 0
SHORTNESS OF BREATH: 0
EYE PAIN: 0
MYALGIAS: 0
FREQUENCY: 0
PARESTHESIAS: 0
DIZZINESS: 0
HEMATOCHEZIA: 0
WEAKNESS: 0
NAUSEA: 0
JOINT SWELLING: 1
CHILLS: 0
CONSTIPATION: 0
NERVOUS/ANXIOUS: 0
HEARTBURN: 0
HEMATURIA: 0
ARTHRALGIAS: 1
DYSURIA: 0
COUGH: 0
DIARRHEA: 0
PALPITATIONS: 0
ABDOMINAL PAIN: 0
BREAST MASS: 0
SORE THROAT: 0
FEVER: 0

## 2020-12-15 ASSESSMENT — MIFFLIN-ST. JEOR: SCORE: 1301.99

## 2020-12-15 ASSESSMENT — ACTIVITIES OF DAILY LIVING (ADL): CURRENT_FUNCTION: NO ASSISTANCE NEEDED

## 2020-12-15 NOTE — PROGRESS NOTES
"SUBJECTIVE:   Florencia Ross is a 70 year old female who presents for Preventive Visit.      Patient has been advised of split billing requirements and indicates understanding: Yes   Are you in the first 12 months of your Medicare coverage?  No    Healthy Habits:     In general, how would you rate your overall health?  Good    Frequency of exercise:  2-3 days/week    Duration of exercise:  15-30 minutes    Do you usually eat at least 4 servings of fruit and vegetables a day, include whole grains    & fiber and avoid regularly eating high fat or \"junk\" foods?  No    Taking medications regularly:  Yes    Medication side effects:  None    Ability to successfully perform activities of daily living:  No assistance needed    Home Safety:  No safety concerns identified    Hearing Impairment:  Difficulty following a conversation in a noisy restaurant or crowded room    In the past 6 months, have you been bothered by leaking of urine?  No    In general, how would you rate your overall mental or emotional health?  Excellent      PHQ-2 Total Score: 0    Additional concerns today:  Yes    Do you feel safe in your environment? Yes    Have you ever done Advance Care Planning? (For example, a Health Directive, POLST, or a discussion with a medical provider or your loved ones about your wishes): Yes, patient states has an Advance Care Planning document and will bring a copy to the clinic.     tried hearing aids and they did not help.  Would like ears checked for wax  Will refer to audiology    Fall risk       Cognitive Screening   1) Repeat 3 items (Leader, Season, Table)    2) Clock draw: NOrmal  3) 3 item recall: Recalls 3 objects  Results: 3 items recalled: COGNITIVE IMPAIRMENT LESS LIKELY    Mini-CogTM Copyright BERNARDO Cross. Licensed by the author for use in [facility]; reprinted with permission (wisam@.[facility]). All rights reserved.      Do you have sleep apnea, excessive snoring or daytime drowsiness?: no    Reviewed " and updated as needed this visit by clinical staff  Tobacco  Allergies  Meds   Med Hx  Surg Hx  Fam Hx  Soc Hx        Reviewed and updated as needed this visit by Provider                Social History     Tobacco Use     Smoking status: Former Smoker     Quit date: 1977     Years since quittin.4     Smokeless tobacco: Never Used   Substance Use Topics     Alcohol use: Yes     Comment: 5-7 drinks a week     If you drink alcohol do you typically have >3 drinks per day or >7 drinks per week? No    Alcohol Use 12/15/2020   Prescreen: >3 drinks/day or >7 drinks/week? No       Pt with HTN, controlled on maxzide. Due for labwork and refills  Pt with some hearing loss. Recommend follow-up with audiology for formal assessment        Current providers sharing in care for this patient include:   Patient Care Team:  Shanel Aguiar MD as PCP - General (Family Practice)  Shanel Aguiar MD as Assigned PCP  Ramiro Espitia MD as Assigned Surgical Provider    The following health maintenance items are reviewed in Epic and correct as of today:  Health Maintenance   Topic Date Due     ZOSTER IMMUNIZATION (1 of 2) 2002     Pneumococcal Vaccine: 65+ Years (2 of 2 - PPSV23) 2019     LIPID  2020     MEDICARE ANNUAL WELLNESS VISIT  2020     BMP  2021     FALL RISK ASSESSMENT  2021     MAMMO SCREENING  2022     COLORECTAL CANCER SCREENING  2024     ADVANCE CARE PLANNING  2024     DTAP/TDAP/TD IMMUNIZATION (2 - Td) 2026     DEXA  2034     HEPATITIS C SCREENING  Completed     PHQ-2  Completed     INFLUENZA VACCINE  Completed     Pneumococcal Vaccine: Pediatrics (0 to 5 Years) and At-Risk Patients (6 to 64 Years)  Aged Out     IPV IMMUNIZATION  Aged Out     MENINGITIS IMMUNIZATION  Aged Out     HEPATITIS B IMMUNIZATION  Aged Out     Lab work is in process  Mammogram Screening: Mammogram Screening: Patient over age 50, mutual decision to screen  "reflected in health maintenance.    Review of Systems   Constitutional: Negative for chills and fever.   HENT: Negative for congestion, ear pain, hearing loss and sore throat.    Eyes: Negative for pain and visual disturbance.   Respiratory: Negative for cough and shortness of breath.    Cardiovascular: Negative for chest pain and palpitations.   Gastrointestinal: Negative for abdominal pain, constipation, diarrhea, heartburn, hematochezia and nausea.   Breasts:  Negative for tenderness, breast mass and discharge.   Genitourinary: Negative for dysuria, frequency, genital sores, hematuria, pelvic pain, urgency, vaginal bleeding and vaginal discharge.   Musculoskeletal: Positive for arthralgias and joint swelling. Negative for myalgias.   Skin: Negative for rash.   Neurological: Negative for dizziness, weakness, headaches and paresthesias.   Psychiatric/Behavioral: Negative for mood changes. The patient is not nervous/anxious.      Constitutional, HEENT, cardiovascular, pulmonary, gi and gu systems are negative, except as otherwise noted.    OBJECTIVE:   /83   Pulse 67   Temp 98.1  F (36.7  C) (Oral)   Ht 1.753 m (5' 9\")   Wt 70.8 kg (156 lb)   LMP  (LMP Unknown)   BMI 23.04 kg/m   Estimated body mass index is 23.04 kg/m  as calculated from the following:    Height as of this encounter: 1.753 m (5' 9\").    Weight as of this encounter: 70.8 kg (156 lb).  Physical Exam  GENERAL: healthy, alert and no distress  EYES: Eyes grossly normal to inspection, PERRL and conjunctivae and sclerae normal  HENT: ear canals and TM's normal, nose and mouth without ulcers or lesions  NECK: no adenopathy, no asymmetry, masses, or scars and thyroid normal to palpation  RESP: lungs clear to auscultation - no rales, rhonchi or wheezes  CV: regular rate and rhythm, normal S1 S2, no S3 or S4, no murmur, click or rub, no peripheral edema and peripheral pulses strong  ABDOMEN: soft, nontender, no hepatosplenomegaly, no masses and " "bowel sounds normal  MS: no gross musculoskeletal defects noted, no edema  SKIN: no suspicious lesions or rashes  NEURO: Normal strength and tone, mentation intact and speech normal  PSYCH: mentation appears normal, affect normal/bright    Diagnostic Test Results:  Labs reviewed in Epic    ASSESSMENT / PLAN:   1. Encounter for Medicare annual wellness exam  completed    2. Hypertension goal BP (blood pressure) < 140/90  At goal on meds, refill and check labs  - triamterene-HCTZ (MAXZIDE-25) 37.5-25 MG tablet; Take 1 tablet by mouth daily  Dispense: 90 tablet; Refill: 3  - Lipid panel reflex to direct LDL Fasting  - Basic metabolic panel    3. Sensorineural hearing loss (SNHL) of both ears  refer  - AUDIOLOGY ADULT REFERRAL; Future    4. Need for pneumococcal vaccine  declined      Patient has been advised of split billing requirements and indicates understanding: Yes  COUNSELING:  Reviewed preventive health counseling, as reflected in patient instructions       Regular exercise       Healthy diet/nutrition       Vision screening       Hearing screening       Dental care    Estimated body mass index is 23.04 kg/m  as calculated from the following:    Height as of this encounter: 1.753 m (5' 9\").    Weight as of this encounter: 70.8 kg (156 lb).        She reports that she quit smoking about 43 years ago. She has never used smokeless tobacco.      Appropriate preventive services were discussed with this patient, including applicable screening as appropriate for cardiovascular disease, diabetes, osteopenia/osteoporosis, and glaucoma.  As appropriate for age/gender, discussed screening for colorectal cancer, prostate cancer, breast cancer, and cervical cancer. Checklist reviewing preventive services available has been given to the patient.    Reviewed patients plan of care and provided an AVS. The Intermediate Care Plan ( asthma action plan, low back pain action plan, and migraine action plan) for Florencia meets the Care " Plan requirement. This Care Plan has been established and reviewed with the Patient.    Counseling Resources:  ATP IV Guidelines  Pooled Cohorts Equation Calculator  Breast Cancer Risk Calculator  Breast Cancer: Medication to Reduce Risk  FRAX Risk Assessment  ICSI Preventive Guidelines  Dietary Guidelines for Americans, 2010  USDA's MyPlate  ASA Prophylaxis  Lung CA Screening    Shanel Aguiar MD  [facility]    Identified Health Risks:

## 2020-12-17 DIAGNOSIS — A60.00 RECURRENT GENITAL HERPES: ICD-10-CM

## 2020-12-17 RX ORDER — ACYCLOVIR 400 MG/1
TABLET ORAL
Qty: 180 TABLET | Refills: 0 | Status: SHIPPED | OUTPATIENT
Start: 2020-12-17 | End: 2021-10-10

## 2021-01-08 ENCOUNTER — OFFICE VISIT (OUTPATIENT)
Dept: FAMILY MEDICINE | Facility: CLINIC | Age: 71
End: 2021-01-08
Payer: COMMERCIAL

## 2021-01-08 VITALS — SYSTOLIC BLOOD PRESSURE: 160 MMHG | DIASTOLIC BLOOD PRESSURE: 78 MMHG

## 2021-01-08 DIAGNOSIS — R30.0 DYSURIA: Primary | ICD-10-CM

## 2021-01-08 DIAGNOSIS — Z87.442 HISTORY OF NEPHROLITHIASIS: ICD-10-CM

## 2021-01-08 DIAGNOSIS — I10 HYPERTENSION GOAL BP (BLOOD PRESSURE) < 140/90: ICD-10-CM

## 2021-01-08 PROCEDURE — 99214 OFFICE O/P EST MOD 30 MIN: CPT | Performed by: NURSE PRACTITIONER

## 2021-01-08 PROCEDURE — 87086 URINE CULTURE/COLONY COUNT: CPT | Performed by: NURSE PRACTITIONER

## 2021-01-08 PROCEDURE — 81001 URINALYSIS AUTO W/SCOPE: CPT | Performed by: NURSE PRACTITIONER

## 2021-01-08 RX ORDER — LOSARTAN POTASSIUM 50 MG/1
50 TABLET ORAL DAILY
Qty: 30 TABLET | Refills: 0 | Status: SHIPPED | OUTPATIENT
Start: 2021-01-08 | End: 2021-01-22

## 2021-01-08 RX ORDER — CEPHALEXIN 500 MG/1
500 CAPSULE ORAL 2 TIMES DAILY
Qty: 14 CAPSULE | Refills: 0 | Status: SHIPPED | OUTPATIENT
Start: 2021-01-08 | End: 2021-01-15

## 2021-01-08 ASSESSMENT — ENCOUNTER SYMPTOMS
DIFFICULTY URINATING: 0
CHILLS: 1
DYSURIA: 1
SHORTNESS OF BREATH: 0
HEMATURIA: 1
FREQUENCY: 1
FEVER: 0

## 2021-01-08 ASSESSMENT — MIFFLIN-ST. JEOR: SCORE: 1280.22

## 2021-01-08 NOTE — PROGRESS NOTES
Assessment & Plan     1. Dysuria  - *UA reflex to Microscopic and Culture ([place] [facility] (except [place] and [place])  - Urine Microscopic  - Urine Culture Aerobic Bacterial  - cephALEXin (KEFLEX) 500 MG capsule; Take 1 capsule (500 mg) by mouth 2 times daily for 7 days  Dispense: 14 capsule; Refill: 0    2. History of nephrolithiasis    3. Hypertension goal BP (blood pressure) < 140/90  - advised to monitor BP at home, notify office if 140/90 or higher.  Otherwise, follow-up in 4 weeks for BP check.  - discontinue Triamterene/HCTZ.   - losartan (COZAAR) 50 MG tablet; Take 1 tablet (50 mg) by mouth daily  Dispense: 30 tablet; Refill: 0        Return in about 4 weeks (around 2/5/2021) for BP Recheck.    TEREZA Pisano CNP  [facility] [place]    Subjective     Florencia is a 70 year old who presents to clinic today for the following health issues     HPI       Genitourinary - Female  Onset/Duration:1 day  Description:   Painful urination (Dysuria): YES           Frequency: YES  Blood in urine (Hematuria): YES  Delay in urine (Hesitency): YES  Intensity: mild  Progression of Symptoms:  improving  Accompanying Signs & Symptoms:  Fever/chills: YES  Flank pain: no  Nausea and vomiting: no  Vaginal symptoms: none  Abdominal/Pelvic Pain: pressure  History:   History of frequent UTI s: YES  History of kidney stones: YES  Sexually Active: YES  Possibility of pregnancy: No  Precipitating or alleviating factors:   Therapies tried and outcome: took cephalexin from previous UTI helped      Florencia has a h/o kidney stones and thinks she passed a stone last night. She took an old prescription of Keflex last night and feels it helped her symptoms.  Experienced intense burning last night and better today.    Florencia has taken a diuretic for HTN for over 20 years.  She has a h/o kidney stones and based on chart review seems to pineda stone symptoms often.  Discussed trying to change her BP meds to see if  "this can reduce stone frequency.  Pt notes her BP is elevated today as she recently got news that her brother is critically ill.       Review of Systems   Constitutional: Positive for chills. Negative for fever.   Respiratory: Negative for shortness of breath.    Cardiovascular: Negative for chest pain.   Genitourinary: Positive for dysuria, frequency, hematuria and pelvic pain. Negative for difficulty urinating.            Objective    BP (!) (P) 195/93   Pulse (P) 70   Temp (P) 98.3  F (36.8  C) (Oral)   Ht (P) 1.753 m (5' 9\")   Wt (P) 68.6 kg (151 lb 3.2 oz)   LMP  (LMP Unknown)   BMI (P) 22.33 kg/m    Body mass index is 22.33 kg/m  (pended).  Physical Exam  Vitals signs reviewed.   Constitutional:       Appearance: She is well-developed.   HENT:      Head: Normocephalic.   Cardiovascular:      Rate and Rhythm: Normal rate and regular rhythm.   Pulmonary:      Effort: Pulmonary effort is normal.      Breath sounds: Normal breath sounds.   Skin:     General: Skin is warm and dry.   Neurological:      Mental Status: She is alert and oriented to person, place, and time.   Psychiatric:         Mood and Affect: Mood normal.         Behavior: Behavior normal.            Results for orders placed or performed in visit on 01/08/21   *UA reflex to Microscopic and Culture ([place] [facility] (except [place] and [place])     Status: Abnormal    Specimen: Midstream Urine   Result Value Ref Range    Color Urine Yellow     Appearance Urine Clear     Glucose Urine Negative NEG^Negative mg/dL    Bilirubin Urine Negative NEG^Negative    Ketones Urine Negative NEG^Negative mg/dL    Specific Gravity Urine 1.010 1.003 - 1.035    Blood Urine Trace (A) NEG^Negative    pH Urine 6.5 5.0 - 7.0 pH    Protein Albumin Urine Negative NEG^Negative mg/dL    Urobilinogen Urine 0.2 0.2 - 1.0 EU/dL    Nitrite Urine Negative NEG^Negative    Leukocyte Esterase Urine Negative NEG^Negative    Source Midstream Urine    Urine " Microscopic     Status: None   Result Value Ref Range    WBC Urine 0 - 5 OTO5^0 - 5 /HPF    RBC Urine O - 2 OTO2^O - 2 /HPF    Squamous Epithelial /LPF Urine Few FEW^Few /LPF

## 2021-01-09 LAB
BACTERIA SPEC CULT: NO GROWTH
Lab: NORMAL
SPECIMEN SOURCE: NORMAL

## 2021-01-20 NOTE — PROGRESS NOTES
Assessment & Plan     1. Hypertension goal BP (blood pressure) < 140/90  - losartan (COZAAR) 50 MG tablet; Take 1 tablet (50 mg) by mouth 2 times daily  Dispense: 60 tablet; Refill: 1  - continue to check BP at home and keep log for review at next appt.         Return in about 3 weeks (around 2/12/2021) for BP Recheck.    TEREZA Pisano CNP  [facility]     Florencia is a 70 year old who presents to clinic today for the following health issues     HPI       Hypertension Follow-up      Do you check your blood pressure regularly outside of the clinic? Yes     Are you following a low salt diet? Yes tries    Are your blood pressures ever more than 140 on the top number (systolic) OR more   than 90 on the bottom number (diastolic), for example 140/90? Yes      How many servings of fruits and vegetables do you eat daily?  2-3    On average, how many sweetened beverages do you drink each day (Examples: soda, juice, sweet tea, etc.  Do NOT count diet or artificially sweetened beverages)?   0    How many days per week do you exercise enough to make your heart beat faster? 3 or less    How many minutes a day do you exercise enough to make your heart beat faster? 30 - 60    How many days per week do you miss taking your medication? 0    Florencia was seen in the clinic 2 weeks ago for urinary symptoms and possible kidney stone, which are chronic problems.  BP was elevated at that time.  We stopped Triamterene/HCTZ due to the possibility of contributing to kidney stones and started her on Losartan.  Today she reports Home readings 140-170's/60-80's.  She has had a few 110's systolic but primary BP is running above goal.  Denies chest pain, shortness of breath, dizziness, headaches, edema, or weight gain.         Review of Systems   Constitutional: Negative for chills and fever.   Respiratory: Negative for cough and shortness of breath.    Cardiovascular: Negative for chest pain and peripheral  "edema.   Neurological: Negative for dizziness, weakness and headaches.            Objective    BP (!) 147/79   Pulse 73   Temp 94.1  F (34.5  C) (Oral)   Ht 1.753 m (5' 9\")   Wt 71.5 kg (157 lb 9.6 oz)   LMP  (LMP Unknown)   BMI 23.27 kg/m    Body mass index is 23.27 kg/m .  Physical Exam  Vitals signs reviewed.   Constitutional:       Appearance: She is well-developed.   HENT:      Head: Normocephalic.   Cardiovascular:      Rate and Rhythm: Normal rate and regular rhythm.   Pulmonary:      Effort: Pulmonary effort is normal.      Breath sounds: Normal breath sounds.   Skin:     General: Skin is warm and dry.   Neurological:      Mental Status: She is alert and oriented to person, place, and time.   Psychiatric:         Mood and Affect: Mood normal.         Behavior: Behavior normal.                    "

## 2021-01-22 ENCOUNTER — OFFICE VISIT (OUTPATIENT)
Dept: FAMILY MEDICINE | Facility: CLINIC | Age: 71
End: 2021-01-22
Payer: COMMERCIAL

## 2021-01-22 VITALS
WEIGHT: 157.6 LBS | DIASTOLIC BLOOD PRESSURE: 79 MMHG | TEMPERATURE: 94.1 F | BODY MASS INDEX: 23.34 KG/M2 | HEIGHT: 69 IN | HEART RATE: 73 BPM | SYSTOLIC BLOOD PRESSURE: 147 MMHG

## 2021-01-22 DIAGNOSIS — I10 HYPERTENSION GOAL BP (BLOOD PRESSURE) < 140/90: Primary | ICD-10-CM

## 2021-01-22 PROCEDURE — 99214 OFFICE O/P EST MOD 30 MIN: CPT | Performed by: NURSE PRACTITIONER

## 2021-01-22 RX ORDER — LOSARTAN POTASSIUM 50 MG/1
50 TABLET ORAL 2 TIMES DAILY
Qty: 60 TABLET | Refills: 1 | Status: SHIPPED | OUTPATIENT
Start: 2021-01-22 | End: 2021-02-26

## 2021-01-22 ASSESSMENT — ENCOUNTER SYMPTOMS
SHORTNESS OF BREATH: 0
FEVER: 0
WEAKNESS: 0
CHILLS: 0
DIZZINESS: 0
HEADACHES: 0
COUGH: 0

## 2021-01-22 ASSESSMENT — MIFFLIN-ST. JEOR: SCORE: 1309.25

## 2021-01-28 ENCOUNTER — TRANSFERRED RECORDS (OUTPATIENT)
Dept: HEALTH INFORMATION MANAGEMENT | Facility: CLINIC | Age: 71
End: 2021-01-28

## 2021-02-03 NOTE — PROGRESS NOTES
Assessment & Plan     1. Hypertension goal BP (blood pressure) < 140/90  - continue Losartan 50 mg BID  - amLODIPine (NORVASC) 2.5 MG tablet; Take 1 tablet (2.5 mg) by mouth daily  Dispense: 30 tablet; Refill: 0    Return in about 2 weeks (around 2/18/2021) for BP Recheck.    TEREZA Pisano CNP  [facility] [place]    Subjective     Florencia is a 70 year old who presents to clinic today for the following health issues     History of Present Illness       Hypertension: She presents for follow up of hypertension.  She does check blood pressure  regularly outside of the clinic. Outside blood pressures have been over 140/90. She does not follow a low salt diet.     She eats 2-3 servings of fruits and vegetables daily.She consumes 0 sweetened beverage(s) daily.She exercises with enough effort to increase her heart rate 10 to 19 minutes per day.  She exercises with enough effort to increase her heart rate 4 days per week.   She is taking medications regularly.     Florencia presents for f/u of HTN.  2 weeks ago we increased Losartan to 50 BID.    Home BP running 130/160's systolic on average but she has had a few systolics in the 180's.   She is eating healthier and has even been avoiding alcohol and caffeine in attempt to get BP down.  Reports occasional dull headaches.  Denies chest pain, shortness of breath, dizziness, or edema.          Review of Systems   Constitutional: Negative for chills and fever.   Respiratory: Negative for cough and shortness of breath.    Cardiovascular: Negative for chest pain, palpitations and peripheral edema.   Neurological: Positive for headaches. Negative for dizziness and weakness.            Objective    BP (!) 158/86   Pulse 70   Temp 99.5  F (37.5  C) (Oral)   Wt 72.1 kg (159 lb)   LMP  (LMP Unknown)   BMI 23.48 kg/m    Body mass index is 23.48 kg/m .  Physical Exam  Vitals signs reviewed.   Constitutional:       Appearance: She is well-developed.   HENT:      Head:  Normocephalic.   Cardiovascular:      Rate and Rhythm: Normal rate and regular rhythm.   Pulmonary:      Effort: Pulmonary effort is normal.      Breath sounds: Normal breath sounds.   Skin:     General: Skin is warm and dry.   Neurological:      Mental Status: She is alert and oriented to person, place, and time.

## 2021-02-04 ENCOUNTER — OFFICE VISIT (OUTPATIENT)
Dept: FAMILY MEDICINE | Facility: CLINIC | Age: 71
End: 2021-02-04
Payer: COMMERCIAL

## 2021-02-04 VITALS
WEIGHT: 159 LBS | SYSTOLIC BLOOD PRESSURE: 158 MMHG | DIASTOLIC BLOOD PRESSURE: 86 MMHG | BODY MASS INDEX: 23.48 KG/M2 | HEART RATE: 70 BPM | TEMPERATURE: 99.5 F

## 2021-02-04 DIAGNOSIS — I10 HYPERTENSION GOAL BP (BLOOD PRESSURE) < 140/90: Primary | ICD-10-CM

## 2021-02-04 PROCEDURE — 99213 OFFICE O/P EST LOW 20 MIN: CPT | Performed by: NURSE PRACTITIONER

## 2021-02-04 RX ORDER — AMLODIPINE BESYLATE 2.5 MG/1
2.5 TABLET ORAL DAILY
Qty: 30 TABLET | Refills: 0 | Status: SHIPPED | OUTPATIENT
Start: 2021-02-04 | End: 2021-02-18

## 2021-02-04 ASSESSMENT — ENCOUNTER SYMPTOMS
CHILLS: 0
HEADACHES: 1
FEVER: 0
DIZZINESS: 0
PALPITATIONS: 0
WEAKNESS: 0
SHORTNESS OF BREATH: 0
COUGH: 0

## 2021-02-17 NOTE — PROGRESS NOTES
Assessment & Plan     1. Hypertension goal BP (blood pressure) < 140/90  - continue Losartan 50 mg BID  - amLODIPine (NORVASC) 5 MG tablet; Take 1 tablet (5 mg) by mouth daily  Dispense: 30 tablet; Refill: 0    Return in about 3 weeks (around 3/11/2021) for My Chart or Call BP readings..    TEREZA Pisano CNP  [facility]    Tan Don is a 70 year old who presents for the following health issues     Hypertension Follow-up    History of Present Illness       Hypertension: She presents for follow up of hypertension.  She does check blood pressure  regularly outside of the clinic. Outside blood pressures have been over 140/90. She does not follow a low salt diet.     She eats 2-3 servings of fruits and vegetables daily.She consumes 1 sweetened beverage(s) daily.She exercises with enough effort to increase her heart rate 10 to 19 minutes per day.  She exercises with enough effort to increase her heart rate 3 or less days per week.   She is taking medications regularly.     At last visit we added Amlodipine 2.5 mg daily to Losartan 50 mg BID.  Home BP running 140-150's/60-70's.  She is getting a few systolic readings in the 120-130's.  Denies chest pain, shortness of breath, dizziness, or edema.        Review of Systems   Constitutional: Negative for chills and fever.   Respiratory: Negative for cough and shortness of breath.    Cardiovascular: Negative for chest pain, palpitations and peripheral edema.   Neurological: Negative for dizziness and headaches.            Objective    BP (!) 154/60   Pulse 77   Temp 98.8  F (37.1  C) (Oral)   Wt 70.5 kg (155 lb 6.4 oz)   LMP  (LMP Unknown)   BMI 22.95 kg/m    Body mass index is 22.95 kg/m .  Physical Exam  Vitals signs reviewed.   HENT:      Head: Normocephalic.   Cardiovascular:      Rate and Rhythm: Normal rate and regular rhythm.   Pulmonary:      Effort: Pulmonary effort is normal.      Breath sounds: Normal breath sounds.    Skin:     General: Skin is warm and dry.   Neurological:      Mental Status: She is alert and oriented to person, place, and time.   Psychiatric:         Mood and Affect: Mood normal.         Behavior: Behavior normal.

## 2021-02-18 ENCOUNTER — OFFICE VISIT (OUTPATIENT)
Dept: FAMILY MEDICINE | Facility: CLINIC | Age: 71
End: 2021-02-18
Payer: COMMERCIAL

## 2021-02-18 VITALS
DIASTOLIC BLOOD PRESSURE: 60 MMHG | HEART RATE: 77 BPM | SYSTOLIC BLOOD PRESSURE: 154 MMHG | BODY MASS INDEX: 22.95 KG/M2 | WEIGHT: 155.4 LBS | TEMPERATURE: 98.8 F

## 2021-02-18 DIAGNOSIS — I10 HYPERTENSION GOAL BP (BLOOD PRESSURE) < 140/90: ICD-10-CM

## 2021-02-18 PROCEDURE — 99213 OFFICE O/P EST LOW 20 MIN: CPT | Performed by: NURSE PRACTITIONER

## 2021-02-18 RX ORDER — AMLODIPINE BESYLATE 5 MG/1
5 TABLET ORAL DAILY
Qty: 30 TABLET | Refills: 0 | Status: SHIPPED | OUTPATIENT
Start: 2021-02-18 | End: 2021-02-26

## 2021-02-18 RX ORDER — LOSARTAN POTASSIUM 50 MG/1
50 TABLET ORAL 2 TIMES DAILY
Qty: 60 TABLET | Refills: 1 | OUTPATIENT
Start: 2021-02-18

## 2021-02-18 ASSESSMENT — ENCOUNTER SYMPTOMS
PALPITATIONS: 0
HEADACHES: 0
CHILLS: 0
DIZZINESS: 0
COUGH: 0
SHORTNESS OF BREATH: 0
FEVER: 0

## 2021-02-26 ENCOUNTER — OFFICE VISIT (OUTPATIENT)
Dept: FAMILY MEDICINE | Facility: CLINIC | Age: 71
End: 2021-02-26
Payer: COMMERCIAL

## 2021-02-26 VITALS
HEART RATE: 72 BPM | SYSTOLIC BLOOD PRESSURE: 132 MMHG | TEMPERATURE: 99 F | DIASTOLIC BLOOD PRESSURE: 78 MMHG | BODY MASS INDEX: 22.68 KG/M2 | WEIGHT: 153.6 LBS

## 2021-02-26 DIAGNOSIS — I10 HYPERTENSION GOAL BP (BLOOD PRESSURE) < 140/90: Primary | ICD-10-CM

## 2021-02-26 PROCEDURE — 99213 OFFICE O/P EST LOW 20 MIN: CPT | Performed by: NURSE PRACTITIONER

## 2021-02-26 RX ORDER — LOSARTAN POTASSIUM 50 MG/1
50 TABLET ORAL 2 TIMES DAILY
Qty: 180 TABLET | Refills: 1 | Status: SHIPPED | OUTPATIENT
Start: 2021-02-26 | End: 2021-08-03

## 2021-02-26 RX ORDER — AMLODIPINE BESYLATE 10 MG/1
10 TABLET ORAL DAILY
Qty: 90 TABLET | Refills: 0 | Status: SHIPPED | OUTPATIENT
Start: 2021-02-26 | End: 2021-04-06

## 2021-02-26 ASSESSMENT — ENCOUNTER SYMPTOMS
SHORTNESS OF BREATH: 0
FEVER: 0
COUGH: 0
PALPITATIONS: 0
CHILLS: 0

## 2021-02-26 NOTE — PROGRESS NOTES
Assessment & Plan     1. Hypertension goal BP (blood pressure) < 140/90  - losartan (COZAAR) 50 MG tablet; Take 1 tablet (50 mg) by mouth 2 times daily  Dispense: 180 tablet; Refill: 1  - amLODIPine (NORVASC) 10 MG tablet; Take 1 tablet (10 mg) by mouth daily  Dispense: 90 tablet; Refill: 0      Return in about 4 weeks (around 3/26/2021) for BP Recheck.    TEREZA Pisano CNP  [facility]   Florencia is a 70 year old who presents for the following health issues     HPI       Hypertension Follow-up      Do you check your blood pressure regularly outside of the clinic? Yes     Are you following a low salt diet? Yes watch what she eats    Are your blood pressures ever more than 140 on the top number (systolic) OR more   than 90 on the bottom number (diastolic), for example 140/90? Yes      How many servings of fruits and vegetables do you eat daily?  4 or more    On average, how many sweetened beverages do you drink each day (Examples: soda, juice, sweet tea, etc.  Do NOT count diet or artificially sweetened beverages)?   1    How many days per week do you exercise enough to make your heart beat faster? 3 or less    How many minutes a day do you exercise enough to make your heart beat faster? 10 - 19 depends on day    How many days per week do you miss taking your medication? 0    Florencia presents for follow-up of HTN.  She is currently taking Losartan 50 mg BID and Amlodipine 5 mg daily.  She is checking her BP multiple times per day, averaging 130-150's/70-80's.   Denies any side effects from current medication regimen.          Review of Systems   Constitutional: Negative for chills and fever.   Respiratory: Negative for cough and shortness of breath.    Cardiovascular: Negative for chest pain, palpitations and peripheral edema.            Objective    /78   Pulse 72   Temp 99  F (37.2  C) (Oral)   Wt 69.7 kg (153 lb 9.6 oz)   LMP  (LMP Unknown)   BMI 22.68 kg/m     Body mass index is 22.68 kg/m .  Physical Exam  Vitals signs reviewed.   Constitutional:       Appearance: She is well-developed.   HENT:      Head: Normocephalic.   Cardiovascular:      Rate and Rhythm: Normal rate and regular rhythm.   Pulmonary:      Effort: Pulmonary effort is normal.      Breath sounds: Normal breath sounds.   Skin:     General: Skin is warm and dry.   Neurological:      Mental Status: She is alert and oriented to person, place, and time.   Psychiatric:         Mood and Affect: Mood normal.         Behavior: Behavior normal.

## 2021-03-15 ENCOUNTER — TELEPHONE (OUTPATIENT)
Dept: FAMILY MEDICINE | Facility: CLINIC | Age: 71
End: 2021-03-15

## 2021-03-15 DIAGNOSIS — I10 HYPERTENSION GOAL BP (BLOOD PRESSURE) < 140/90: Primary | ICD-10-CM

## 2021-03-15 RX ORDER — AMLODIPINE BESYLATE 5 MG/1
5 TABLET ORAL 2 TIMES DAILY
Qty: 180 TABLET | Refills: 0 | Status: SHIPPED | OUTPATIENT
Start: 2021-03-15 | End: 2021-05-12

## 2021-03-15 NOTE — TELEPHONE ENCOUNTER
Pt states she needs new rx for amlodpine.    5mg amlodpine take 2 times daily.    Pt is not liking to have to cut the 10mg in half.      Shyla VARGAS, Patient Care

## 2021-04-05 NOTE — PROGRESS NOTES
Assessment & Plan     Rash  Trial of topical antifungal. If not helpful try stronger steroids. If no better, than follow-up with derm  - nystatin (MYCOSTATIN) 398088 UNIT/GM external cream; Apply topically 2 times daily  - triamcinolone (KENALOG) 0.1 % external cream; Apply topically 2 times daily    Hypertension goal BP (blood pressure) < 140/90  At goal on current meds, due for labwork  - Basic metabolic panel                 No follow-ups on file.    Shanel Aguiar MD  [facility]   Florencia is a 70 year old who presents for the following health issues     HPI     Hypertension Follow-up      Do you check your blood pressure regularly outside of the clinic? Yes     Are you following a low salt diet? No    Are your blood pressures ever more than 140 on the top number (systolic) OR more   than 90 on the bottom number (diastolic), for example 140/90? No          Rash  Onset/Duration: Jan 20   Description  Location: in between breasts   Character: blotchy, raised, flakey, red  Itching: no  Intensity:  Mild to moderate   Progression of Symptoms:  same  Accompanying signs and symptoms:   Fever: no  Body aches or joint pain: YES  Sore throat symptoms: no  Recent cold symptoms: no  History:           Previous episodes of similar rash: None  New exposures:  None  Recent travel: no  Exposure to similar rash: no  Precipitating or alleviating factors:   Therapies tried and outcome: hydrocortisone cream -  not effective    Pt had BP meds changed from maxzide due to kidney stone   It was changed to losqrtan 50 bid and then norvasc 10 mg  She had swelling in her feet so she dropped the norvasc dose down to 5 mg  Her BP still seems to be at goal and the swelling improved. She will stay at this dose for now and monitor her BP.  She id due to have her BMp rechecked    Pt with nonbothersome rash on her chest, mainly between her breasts.  It is not bothersome other than it is there. She did  try some otc topical steroids and it did not seem to help.  Nobody else with rash and no known exposures      Review of Systems   Constitutional, HEENT, cardiovascular, pulmonary, gi and gu systems are negative, except as otherwise noted.      Objective    LMP  (LMP Unknown)   There is no height or weight on file to calculate BMI.  Physical Exam   GENERAL: healthy, alert and no distress  NECK: no adenopathy, no asymmetry, masses, or scars and thyroid normal to palpation  RESP: lungs clear to auscultation - no rales, rhonchi or wheezes  CV: regular rate and rhythm, normal S1 S2, no S3 or S4, no murmur, click or rub, no peripheral edema and peripheral pulses strong  SKIN: slightly erythematous . Flat , scaly rash between breasts    No results found for this or any previous visit (from the past 24 hour(s)).

## 2021-04-06 ENCOUNTER — OFFICE VISIT (OUTPATIENT)
Dept: FAMILY MEDICINE | Facility: CLINIC | Age: 71
End: 2021-04-06
Payer: COMMERCIAL

## 2021-04-06 VITALS
WEIGHT: 149 LBS | TEMPERATURE: 98.6 F | SYSTOLIC BLOOD PRESSURE: 137 MMHG | HEIGHT: 69 IN | HEART RATE: 77 BPM | DIASTOLIC BLOOD PRESSURE: 69 MMHG | BODY MASS INDEX: 22.07 KG/M2

## 2021-04-06 DIAGNOSIS — I10 HYPERTENSION GOAL BP (BLOOD PRESSURE) < 140/90: ICD-10-CM

## 2021-04-06 DIAGNOSIS — R21 RASH: Primary | ICD-10-CM

## 2021-04-06 LAB
ANION GAP SERPL CALCULATED.3IONS-SCNC: 3 MMOL/L (ref 3–14)
BUN SERPL-MCNC: 10 MG/DL (ref 7–30)
CALCIUM SERPL-MCNC: 9.6 MG/DL (ref 8.5–10.1)
CHLORIDE SERPL-SCNC: 106 MMOL/L (ref 94–109)
CO2 SERPL-SCNC: 31 MMOL/L (ref 20–32)
CREAT SERPL-MCNC: 0.74 MG/DL (ref 0.52–1.04)
GFR SERPL CREATININE-BSD FRML MDRD: 82 ML/MIN/{1.73_M2}
GLUCOSE SERPL-MCNC: 84 MG/DL (ref 70–99)
POTASSIUM SERPL-SCNC: 3.7 MMOL/L (ref 3.4–5.3)
SODIUM SERPL-SCNC: 140 MMOL/L (ref 133–144)

## 2021-04-06 PROCEDURE — 36415 COLL VENOUS BLD VENIPUNCTURE: CPT | Performed by: FAMILY MEDICINE

## 2021-04-06 PROCEDURE — 99214 OFFICE O/P EST MOD 30 MIN: CPT | Performed by: FAMILY MEDICINE

## 2021-04-06 PROCEDURE — 80048 BASIC METABOLIC PNL TOTAL CA: CPT | Performed by: FAMILY MEDICINE

## 2021-04-06 RX ORDER — NYSTATIN 100000 U/G
CREAM TOPICAL 2 TIMES DAILY
Qty: 30 G | Refills: 1 | Status: SHIPPED | OUTPATIENT
Start: 2021-04-06 | End: 2021-07-01

## 2021-04-06 RX ORDER — TRIAMCINOLONE ACETONIDE 1 MG/G
CREAM TOPICAL 2 TIMES DAILY
Qty: 15 G | Refills: 0 | Status: SHIPPED | OUTPATIENT
Start: 2021-04-06 | End: 2021-07-01

## 2021-04-06 ASSESSMENT — MIFFLIN-ST. JEOR: SCORE: 1270.24

## 2021-05-12 ENCOUNTER — TELEPHONE (OUTPATIENT)
Dept: FAMILY MEDICINE | Facility: CLINIC | Age: 71
End: 2021-05-12

## 2021-05-12 DIAGNOSIS — I10 HYPERTENSION GOAL BP (BLOOD PRESSURE) < 140/90: ICD-10-CM

## 2021-05-12 RX ORDER — AMLODIPINE BESYLATE 5 MG/1
5 TABLET ORAL DAILY
Qty: 14 TABLET | Refills: 0 | Status: SHIPPED | OUTPATIENT
Start: 2021-05-12 | End: 2021-06-08

## 2021-05-12 NOTE — TELEPHONE ENCOUNTER
Per 4/6/21 office notes, patient currently taking amlodipine 5 mg daily.    Routing refill request to provider for review/approval because:  Drug interaction warning    Arielle Matute BSN, RN

## 2021-05-12 NOTE — TELEPHONE ENCOUNTER
Patient went out of town and forgot her medication for amlodipine, patient went to the pharmacy to try and get some to hold her until she goes home but the pharmacy is not able to do that, patient would need a small quantity sent to the [facility] so patient does not stop the med while she is not home.  Please call patient with questions.  Milton Albright,  For 1st Floor Primary Care

## 2021-06-08 DIAGNOSIS — I10 HYPERTENSION GOAL BP (BLOOD PRESSURE) < 140/90: ICD-10-CM

## 2021-06-08 RX ORDER — AMLODIPINE BESYLATE 5 MG/1
TABLET ORAL
Qty: 180 TABLET | Refills: 3 | Status: SHIPPED | OUTPATIENT
Start: 2021-06-08 | End: 2021-07-01

## 2021-06-08 NOTE — TELEPHONE ENCOUNTER
Amlodipine refill request  Last OV: 4/6/21 Dr. Shanel Hartman for htn.  Warning flags when attempting to refill.  TO MD to advise.  Med pending.  Amita Ibarra RN

## 2021-06-25 ENCOUNTER — OFFICE VISIT (OUTPATIENT)
Dept: URGENT CARE | Facility: URGENT CARE | Age: 71
End: 2021-06-25
Payer: COMMERCIAL

## 2021-06-25 VITALS
TEMPERATURE: 98.1 F | OXYGEN SATURATION: 98 % | DIASTOLIC BLOOD PRESSURE: 75 MMHG | HEART RATE: 82 BPM | SYSTOLIC BLOOD PRESSURE: 131 MMHG

## 2021-06-25 DIAGNOSIS — N30.01 ACUTE CYSTITIS WITH HEMATURIA: Primary | ICD-10-CM

## 2021-06-25 DIAGNOSIS — R35.0 URINARY FREQUENCY: ICD-10-CM

## 2021-06-25 DIAGNOSIS — R82.90 ABNORMAL FINDING ON URINALYSIS: ICD-10-CM

## 2021-06-25 LAB
ALBUMIN UR-MCNC: ABNORMAL MG/DL
APPEARANCE UR: ABNORMAL
BILIRUB UR QL STRIP: NEGATIVE
COLOR UR AUTO: YELLOW
GLUCOSE UR STRIP-MCNC: NEGATIVE MG/DL
HGB UR QL STRIP: ABNORMAL
KETONES UR STRIP-MCNC: NEGATIVE MG/DL
LEUKOCYTE ESTERASE UR QL STRIP: ABNORMAL
NITRATE UR QL: NEGATIVE
NON-SQ EPI CELLS #/AREA URNS LPF: ABNORMAL /LPF
PH UR STRIP: 5 PH (ref 5–7)
RBC #/AREA URNS AUTO: ABNORMAL /HPF
SOURCE: ABNORMAL
SP GR UR STRIP: <=1.005 (ref 1–1.03)
UROBILINOGEN UR STRIP-ACNC: 0.2 EU/DL (ref 0.2–1)
WBC #/AREA URNS AUTO: >100 /HPF

## 2021-06-25 PROCEDURE — 99213 OFFICE O/P EST LOW 20 MIN: CPT | Performed by: NURSE PRACTITIONER

## 2021-06-25 PROCEDURE — 81001 URINALYSIS AUTO W/SCOPE: CPT | Performed by: NURSE PRACTITIONER

## 2021-06-25 PROCEDURE — 87086 URINE CULTURE/COLONY COUNT: CPT | Performed by: NURSE PRACTITIONER

## 2021-06-25 RX ORDER — SULFAMETHOXAZOLE/TRIMETHOPRIM 800-160 MG
1 TABLET ORAL 2 TIMES DAILY
Qty: 10 TABLET | Refills: 0 | Status: SHIPPED | OUTPATIENT
Start: 2021-06-25 | End: 2021-06-30

## 2021-06-25 NOTE — PROGRESS NOTES
"Assessment & Plan     Acute cystitis with hematuria    - sulfamethoxazole-trimethoprim (BACTRIM DS) 800-160 MG tablet  Dispense: 10 tablet; Refill: 0    Urinary frequency    - *UA reflex to Microscopic and Culture ([place] [facility] (except [place] and [place])  - Urine Microscopic    Abnormal finding on urinalysis    - Urine Culture Aerobic Bacterial     Reviewed UA during visit showing likely UTI. Prescription sent to pharmacy for Bactrim twice daily for 5 days. Urine culture in process, will notify if patient should stop or change antibiotic. Increase fluid intake, decrease caffeine which is a bladder irritant. Follow-up with your primary care provider in 1-2 weeks after finishing antibiotic to make sure the blood in your urine resolves.    Follow-up with PCP if symptoms persist for 2 days, and sooner if symptoms worsen or new symptoms develop.     Discussed red flag symptoms which warrant immediate visit in emergency room    All questions were answered and patient verbalized understanding. AVS reviewed with patient.     Concha Araya, JAMISON, APRN, CNP 6/25/2021 11:04 AM  [facility]            Tan Don is a 70 year old female who presents to clinic today for the following health issues:  Chief Complaint   Patient presents with     UTI     frequency increasing over the past week     UTI    Onset of symptoms was a few days ago.  Course of illness is same  Current and associated symptoms urinary frequency, \"warmness\" when using the bathroom  Denies dysuria, urgency, hematuria, nausea, emesis, abdominal pain, flank pain, odor, vaginal discharge  She had low grade fever, chills which resolved  Treatment and measures tried Increase fluid intake  She does have a history of kidney stone. She drinks a cup and half of caffeine daily. She tries to drink 64 ounces of water. No history of diabetes, kidney infection.         Problem list, Medication list, Allergies, and " Medical history reviewed in EPIC.    ROS:  Review of systems negative except for noted above        Objective    /75   Pulse 82   Temp 98.1  F (36.7  C) (Tympanic)   LMP  (LMP Unknown)   SpO2 98%   Physical Exam  Constitutional:       General: She is not in acute distress.     Appearance: She is not toxic-appearing or diaphoretic.   Abdominal:      General: Bowel sounds are normal. There is no distension.      Palpations: Abdomen is soft.      Tenderness: There is abdominal tenderness in the suprapubic area. There is no right CVA tenderness, left CVA tenderness, guarding or rebound.   Lymphadenopathy:      Cervical: No cervical adenopathy.   Neurological:      Mental Status: She is alert.          Labs:  Results for orders placed or performed in visit on 06/25/21   *UA reflex to Microscopic and Culture ([place] [facility] (except [place] and [place])     Status: Abnormal    Specimen: Midstream Urine   Result Value Ref Range    Color Urine Yellow     Appearance Urine Slightly Cloudy     Glucose Urine Negative NEG^Negative mg/dL    Bilirubin Urine Negative NEG^Negative    Ketones Urine Negative NEG^Negative mg/dL    Specific Gravity Urine <=1.005 1.003 - 1.035    Blood Urine Moderate (A) NEG^Negative    pH Urine 5.0 5.0 - 7.0 pH    Protein Albumin Urine Trace (A) NEG^Negative mg/dL    Urobilinogen Urine 0.2 0.2 - 1.0 EU/dL    Nitrite Urine Negative NEG^Negative    Leukocyte Esterase Urine Large (A) NEG^Negative    Source Midstream Urine    Urine Microscopic     Status: Abnormal   Result Value Ref Range    WBC Urine >100 (A) OTO5^0 - 5 /HPF    RBC Urine 10-25 (A) OTO2^O - 2 /HPF    Squamous Epithelial /LPF Urine Few FEW^Few /LPF

## 2021-06-25 NOTE — PATIENT INSTRUCTIONS
Please follow-up with your primary care provider in 1-2 weeks after finishing antibiotic to make sure the blood in your urine resolves    Patient Education     Bladder Infection, Female (Adult)   Urine normally doesn't have any germs (bacteria) in it. But bacteria can get into the urinary tract from the skin around the rectum. Or they can travel in the blood from other parts of the body. Once they are in your urinary tract, they can cause infection in these areas:    The urethra (urethritis)    The bladder (cystitis)    The kidneys (pyelonephritis)  The most common place for an infection is in the bladder. This is called a bladder infection. This is one of the most common infections in women. Most bladder infections are easily treated. They are not serious unless the infection spreads to the kidney.  The terms bladder infection, UTI, and cystitis are often used to describe the same thing. But they are not always the same. Cystitis is an inflammation of the bladder. The most common cause of cystitis is an infection.  Symptoms  The infection causes inflammation in the urethra and bladder. This causes many of the symptoms. The most common symptoms of a bladder infection are:    Pain or burning when urinating    Having to urinate more often than normal    Urgent need to urinate    Only a small amount of urine comes out    Blood in urine    Belly (abdominal) discomfort. This is often in the lower belly above the pubic bone.    Cloudy urine    Strong- or bad-smelling urine    Unable to urinate (urinary retention)    Unable to hold urine in (urinary incontinence)    Fever    Loss of appetite    Confusion (in older adults)  Causes  Bladder infections are not contagious. You can't get one from someone else, from a toilet seat, or from sharing a bath.  The most common cause of bladder infections is bacteria from the bowels. The bacteria get onto the skin around the opening of the urethra. From there, they can get into the  urine. Then they travel up to the bladder, causing inflammation and infection. This often happens because of:    Wiping incorrectly after urinating. Always wipe from front to back.    Bowel incontinence    Pregnancy    Procedures such as having a catheter put in    Older age    Not emptying your bladder. This can give bacteria a chance to grow in your urine.    Fluid loss (dehydration)    Constipation    Having sex    Using a diaphragm for birth control   Treatment  Bladder infections are diagnosed by a urine test and urine culture. They are treated with antibiotics. They often clear up quickly without problems. Treatment helps prevent a more serious kidney infection.  Medicines  Medicines can help in the treatment of a bladder infection:    Take antibiotics until they are used up, even if you feel better. It's important to finish them to make sure the infection has cleared.    You can use acetaminophen or ibuprofen for pain, fever, or discomfort, unless another medicine was prescribed. If you have long-term (chronic) liver or kidney disease, talk with your healthcare provider before using these medicines. Also talk with your provider if you've ever had a stomach ulcer or GI (gastrointestinal) bleeding, or are taking blood-thinner medicines.    If you are given phenazopydridine to reduce burning with urination, it will make your urine a bright orange color. This can stain clothing.  Care and prevention  These self-care steps can help prevent future infections:    Drink plenty of fluids. This helps to prevent dehydration and flush out your bladder. Do this unless you must restrict fluids for other health reasons, or your healthcare provider told you not to.    Clean yourself correctly after going to the bathroom. Wipe from front to back after using the toilet. This helps prevent the spread of bacteria.    Urinate more often. Don't try to hold urine in for a long time.    Wear loose-fitting clothes and cotton  underwear. Don't wear tight-fitting pants.    Improve your diet and prevent constipation. Eat more fresh fruits and vegetables, and fiber. Eat less junk foods and fatty foods.    Don't have sex until your symptoms are gone.    Don't have caffeine, alcohol, and spicy foods. These can irritate your bladder.    Urinate right after you have sex to flush out your bladder.    If you use birth control pills and have frequent bladder infections, discuss it with your healthcare provider.  Follow-up care  Call your healthcare provider if all symptoms are not gone after 3 days of treatment. This is especially important if you have repeat infections.  If a culture was done, you will be told if your treatment needs to be changed. If directed, you can call to find out the results.  If X-rays were done, you will be told if the results will affect your treatment.  Call 911  Call 911 if any of the following occur:    Trouble breathing    Hard to wake up or confusion    Fainting (loss of consciousness)    Fast heart rate  When to get medical advice  Call your healthcare provider right away if any of these occur:    Fever of 100.4 F (38.0 C) or higher, or as directed by your healthcare provider    Symptoms are not better after 3 days of treatment    Back or belly pain that gets worse    Repeated vomiting, or unable to keep medicine down    Weakness or dizziness    Vaginal discharge    Pain, redness, or swelling in the outer vaginal area (labia)  [employer] last reviewed this educational content on 11/1/2019 2000-2021 [employer]. All rights reserved. This information is not intended as a substitute for professional medical care. Always follow your healthcare professional's instructions.

## 2021-06-26 LAB
BACTERIA SPEC CULT: NORMAL
Lab: NORMAL
SPECIMEN SOURCE: NORMAL

## 2021-07-01 ENCOUNTER — VIRTUAL VISIT (OUTPATIENT)
Dept: FAMILY MEDICINE | Facility: CLINIC | Age: 71
End: 2021-07-01
Payer: COMMERCIAL

## 2021-07-01 DIAGNOSIS — I10 HYPERTENSION GOAL BP (BLOOD PRESSURE) < 140/90: ICD-10-CM

## 2021-07-01 DIAGNOSIS — R63.4 UNINTENTIONAL WEIGHT LOSS: Primary | ICD-10-CM

## 2021-07-01 PROCEDURE — 99213 OFFICE O/P EST LOW 20 MIN: CPT | Mod: 95 | Performed by: NURSE PRACTITIONER

## 2021-07-01 RX ORDER — AMLODIPINE BESYLATE 5 MG/1
5 TABLET ORAL DAILY
Qty: 180 TABLET | Refills: 3
Start: 2021-07-01 | End: 2021-10-06

## 2021-07-01 ASSESSMENT — ENCOUNTER SYMPTOMS
CHILLS: 0
FEVER: 0
COUGH: 0
SHORTNESS OF BREATH: 0
ABDOMINAL PAIN: 0
UNEXPECTED WEIGHT CHANGE: 1
APPETITE CHANGE: 1

## 2021-07-01 NOTE — PROGRESS NOTES
Florencia is a 70 year old who is being evaluated via a billable video visit.      How would you like to obtain your AVS? MyChart  If the video visit is dropped, the invitation should be resent by: Text to cell phone: [phone]  Will anyone else be joining your video visit? No    Video Start Time: 10:25    Assessment & Plan     1. Unintentional weight loss  - recommend working on diet (eating regular meals/snacks) and increasing protein in diet via protein bars or drinks.  Discussed that her blood pressures are well controlled on current regimen and would recommend working on diet, increasing calories and protein before we consider changing her antihypertensives.  She is agreeable to this plan.     2. Hypertension goal BP (blood pressure) < 140/90  - chronic, stable on current regimen of Losartan 50 mg BID and Amlodipine 5 mg daily.        Return in about 1 month (around 8/1/2021) for worsening symptoms or failure to improve.    TEREZA Pisano CNP  [facility] [place]    Subjective   Florencia is a 70 year old who presents for the following health issues     HPI     Acute Illness  Acute illness concerns: cough  Onset/Duration: all symptoms have resolved except for cough.  Symptoms:  Fever: no  Chills/Sweats: no  Headache (location?): no  Sinus Pressure: no  Conjunctivitis:  no  Ear Pain: no  Rhinorrhea: no  Congestion: no  Sore Throat: no  Cough: YES  Wheeze: no  Decreased Appetite: YES  Nausea: no  Vomiting: no  Diarrhea: no  Dysuria/Freq.: resolved  Dysuria or Hematuria: resolved  Fatigue/Achiness: no  Sick/Strep Exposure: no  Therapies tried and outcome: None    Reports she developed a cold 9 days ago (6/22).  She then developed UTI symptoms and was prescribed Bactrim by  6 days ago.  States her cold symptoms are mostly resolved and she is doing well.      Reports her main concern today is approx 10 lb weight loss over the 6-7 months since stopping her diuretic and starting Losartan and Amlodipine  for HTN.  She is concerned the weight loss is a side effect of the medications.  Home BP running 120-130's/60-70's on average.  Reports she is a picky eater and finds she eats less in the summer months.       Medication Followup of Amlodipine    Taking Medication as prescribed: yes    Side Effects:  Weight loss 10 lbs since January 2021    Medication Helping Symptoms:  yes       Review of Systems   Constitutional: Positive for appetite change and unexpected weight change. Negative for chills and fever.   Respiratory: Negative for cough and shortness of breath.    Cardiovascular: Negative for chest pain.   Gastrointestinal: Negative for abdominal pain.            Objective           Vitals:  No vitals were obtained today due to virtual visit.    Physical Exam   GENERAL: Healthy, alert and no distress  EYES: Eyes grossly normal to inspection.  No discharge or erythema, or obvious scleral/conjunctival abnormalities.  RESP: No audible wheeze, cough, or visible cyanosis.  No visible retractions or increased work of breathing.    SKIN: Visible skin clear. No significant rash, abnormal pigmentation or lesions.  NEURO: Cranial nerves grossly intact.  Mentation and speech appropriate for age.  PSYCH: Mentation appears normal, affect normal/bright, judgement and insight intact, normal speech and appearance well-groomed.              Video-Visit Details    Type of service:  Video Visit    Video End Time:10:45    Originating Location (pt. Location): Home    [facility]     Platform used for Video Visit: [employer]

## 2021-08-03 DIAGNOSIS — I10 HYPERTENSION GOAL BP (BLOOD PRESSURE) < 140/90: ICD-10-CM

## 2021-08-03 RX ORDER — LOSARTAN POTASSIUM 50 MG/1
50 TABLET ORAL 2 TIMES DAILY
Qty: 180 TABLET | Refills: 1 | Status: SHIPPED | OUTPATIENT
Start: 2021-08-03 | End: 2021-12-16

## 2021-10-02 ENCOUNTER — HEALTH MAINTENANCE LETTER (OUTPATIENT)
Age: 71
End: 2021-10-02

## 2021-10-06 ENCOUNTER — OFFICE VISIT (OUTPATIENT)
Dept: URGENT CARE | Facility: URGENT CARE | Age: 71
End: 2021-10-06
Payer: COMMERCIAL

## 2021-10-06 ENCOUNTER — TELEPHONE (OUTPATIENT)
Dept: FAMILY MEDICINE | Facility: CLINIC | Age: 71
End: 2021-10-06

## 2021-10-06 VITALS
WEIGHT: 148 LBS | DIASTOLIC BLOOD PRESSURE: 74 MMHG | TEMPERATURE: 97.9 F | OXYGEN SATURATION: 97 % | SYSTOLIC BLOOD PRESSURE: 146 MMHG | HEART RATE: 86 BPM | BODY MASS INDEX: 21.86 KG/M2

## 2021-10-06 DIAGNOSIS — I10 HYPERTENSION GOAL BP (BLOOD PRESSURE) < 140/90: Primary | ICD-10-CM

## 2021-10-06 LAB
ANION GAP SERPL CALCULATED.3IONS-SCNC: 2 MMOL/L (ref 3–14)
BUN SERPL-MCNC: 15 MG/DL (ref 7–30)
CALCIUM SERPL-MCNC: 9.2 MG/DL (ref 8.5–10.1)
CHLORIDE BLD-SCNC: 109 MMOL/L (ref 94–109)
CO2 SERPL-SCNC: 32 MMOL/L (ref 20–32)
CREAT SERPL-MCNC: 0.63 MG/DL (ref 0.52–1.04)
GFR SERPL CREATININE-BSD FRML MDRD: >90 ML/MIN/1.73M2
GLUCOSE BLD-MCNC: 93 MG/DL (ref 70–99)
POTASSIUM BLD-SCNC: 3.6 MMOL/L (ref 3.4–5.3)
SODIUM SERPL-SCNC: 143 MMOL/L (ref 133–144)

## 2021-10-06 PROCEDURE — 99213 OFFICE O/P EST LOW 20 MIN: CPT | Performed by: FAMILY MEDICINE

## 2021-10-06 PROCEDURE — 80048 BASIC METABOLIC PNL TOTAL CA: CPT | Performed by: FAMILY MEDICINE

## 2021-10-06 PROCEDURE — 36415 COLL VENOUS BLD VENIPUNCTURE: CPT | Performed by: FAMILY MEDICINE

## 2021-10-06 RX ORDER — AMLODIPINE BESYLATE 5 MG/1
5 TABLET ORAL
COMMUNITY
End: 2021-12-16

## 2021-10-06 NOTE — PROGRESS NOTES
"Assessment & Plan     Florencia was seen today for hypertension.    Diagnoses and all orders for this visit:    Hypertension goal BP (blood pressure) < 140/90.  Blood pressure is borderline elevated (146/74) prior to discharge, with benign neurologic exam.  Blood pressure was likely elevated earlier today, as the patient missed her Norvasc and Cozaar doses last evening.  Blood pressure is normalizing since taking her blood pressure medications this morning.  -     Basic metabolic panel  (Ca, Cl, CO2, Creat, Gluc, K, Na, BUN); Future  -     Basic metabolic panel  (Ca, Cl, CO2, Creat, Gluc, K, Na, BUN)    Discussed risks and benefits of treatment strategies.    Patient Instructions     Please monitor your blood pressure, as discussed.    Follow-up with primary care (to discuss your current medication doses), as discussed.    Follow up in the ER immediately if you develop any emergent symptoms, such as:  chest pain > 5 minutes, severe/worsening headache, or other severe/emergent symptoms.      Return for Follow up, as noted in Patient Instructions.    Gill Gonzalez MD  [facility]     Florencia is a 71 year old female, who presents to clinic today for the following health issues:    Chief Complaint   Patient presents with     Hypertension     Elevated BP and headache. Took meds about 1.5 hours later than usual. Ate cinnamon roll and coffee around 7am. and raisin bran a little later.       HPI - Elevated Blood pressure    The patient presents for evaluation, concerned that her blood pressure was 182/80 at 09:30 this morning.  Patient has a known history of hypertension, chronically on Norvasc 5 mg BID (per most recent prescription bottle) and Cozaar 50 BID.  Current medication list states that the patient is taking Norvasc 5 mg daily.  Medication list was updated, as noted in Epic.  Patient states she dislikes taking Norvasc BID (taken since 01/2021), as it makes her \"feet " "hot\".  She is no longer taking Triamterene/HCTZ, due to her history of kidney stones.  Patient states that she missed her evening doses of Norvasc and Cozaar last evening, but she took her morning doses at 09:00 this morning.  Headache is resolving (3/10 earlier this morning), currently 1/10.  Patient reports feeling lightheaded and nauseated earlier this week, but she denies these symptoms currently.  Patient denies the following symptoms:  vision changes, chest pain, shortness of breath, heart palpitations, exertional symptoms, lightheadedness, syncope, edema, orthopnea, or acute neurologic deficits.  The patient states she is anxious about her blood pressure, as she is traveling later this week.    Review of Systems        Objective    BP (!) 146/74   Pulse 86   Temp 97.9  F (36.6  C) (Tympanic)   Wt 67.1 kg (148 lb)   LMP  (LMP Unknown)   SpO2 97%   BMI 21.86 kg/m       Initial blood pressures were 186/100 and 158/82.    Physical Exam   GENERAL APPEARANCE:  Awake, alert, and in no acute distress.    PSYCHIATRIC: Mildly anxious affect.  HEENT:  Sclera anicteric.  No conjunctivitis.  PERRLA.  Extraocular movements are intact.  No obvious nasal congestion.  No erythema, edema, or exudates of the oral mucosa or posterior pharynx.  Mucous membranes moist.  NECK:  Spontaneous full range of motion.  No thyromegaly or mass.  No lymphadenopathy.  HEART:  Normal S1, S2.  Regular rate and rhythm.  2/6 KIM.  No rubs or gallops.  LUNGS:  No respiratory distress.  No wheezes, rales, or rhonchi.  ABDOMEN:  Not distended.    EXTREMITIES:  Moves 4 extremities.   No edema or calf tenderness.  Distal pulses intact.  NEUROLOGIC:  Gait within normal limits.  No facial droop or acute neurologic deficits.  SKIN:  No rash or diaphoresis.    EKG:  Discussed with and declined by patient.    BMP:  Pending at time of patient discharge.    " Never

## 2021-10-06 NOTE — PATIENT INSTRUCTIONS
Please monitor your blood pressure, as discussed.    Follow-up with primary care (to discuss your current medication doses), as discussed.    Follow up in the ER immediately if you develop any emergent symptoms, such as:  chest pain > 5 minutes, severe/worsening headache, or other severe/emergent symptoms.

## 2021-10-06 NOTE — TELEPHONE ENCOUNTER
Patient reports, blood pressure is running higher than normal.  Blood pressure at home initially 174/78,  After lying down and rested, repeat blood pressure 184/85, has a mild headache.  Patient is taking Amlodipine and Losartan as prescribe however did miss take medication this morning.    Canceled dental appointment for today.     BP Readings from Last 6 Encounters:   06/25/21 131/75   04/06/21 137/69   02/26/21 132/78   02/18/21 (!) 154/60   02/04/21 (!) 158/86   01/22/21 (!) 147/79     Denies numbness, weakness.  Due to mild headache, advise Urgent Care for evaluation.  Discussed, ? Related to missed dose last night, however did take medication this morning.  Patient/parent verbalized understanding of instructions provided and agreed with the plan of care  Gracie Graham RN

## 2021-10-10 DIAGNOSIS — A60.00 RECURRENT GENITAL HERPES: ICD-10-CM

## 2021-10-10 RX ORDER — ACYCLOVIR 400 MG/1
TABLET ORAL
Qty: 60 TABLET | Refills: 2 | Status: SHIPPED | OUTPATIENT
Start: 2021-10-10 | End: 2021-12-16

## 2021-10-25 ENCOUNTER — TRANSFERRED RECORDS (OUTPATIENT)
Dept: HEALTH INFORMATION MANAGEMENT | Facility: CLINIC | Age: 71
End: 2021-10-25
Payer: COMMERCIAL

## 2021-11-22 ENCOUNTER — OFFICE VISIT (OUTPATIENT)
Dept: OPHTHALMOLOGY | Facility: CLINIC | Age: 71
End: 2021-11-22
Payer: COMMERCIAL

## 2021-11-22 DIAGNOSIS — H52.03 HYPEROPIA OF BOTH EYES WITH REGULAR ASTIGMATISM AND PRESBYOPIA: ICD-10-CM

## 2021-11-22 DIAGNOSIS — H02.831 DERMATOCHALASIS OF BOTH UPPER EYELIDS: ICD-10-CM

## 2021-11-22 DIAGNOSIS — H02.834 DERMATOCHALASIS OF BOTH UPPER EYELIDS: ICD-10-CM

## 2021-11-22 DIAGNOSIS — H52.223 HYPEROPIA OF BOTH EYES WITH REGULAR ASTIGMATISM AND PRESBYOPIA: ICD-10-CM

## 2021-11-22 DIAGNOSIS — Z01.00 EXAMINATION OF EYES AND VISION: Primary | ICD-10-CM

## 2021-11-22 DIAGNOSIS — H52.4 HYPEROPIA OF BOTH EYES WITH REGULAR ASTIGMATISM AND PRESBYOPIA: ICD-10-CM

## 2021-11-22 DIAGNOSIS — H25.13 NUCLEAR SCLEROTIC CATARACT OF BOTH EYES: ICD-10-CM

## 2021-11-22 PROCEDURE — 92014 COMPRE OPH EXAM EST PT 1/>: CPT | Performed by: STUDENT IN AN ORGANIZED HEALTH CARE EDUCATION/TRAINING PROGRAM

## 2021-11-22 PROCEDURE — 92015 DETERMINE REFRACTIVE STATE: CPT | Performed by: STUDENT IN AN ORGANIZED HEALTH CARE EDUCATION/TRAINING PROGRAM

## 2021-11-22 ASSESSMENT — REFRACTION_WEARINGRX
OS_ADD: +2.50
SPECS_TYPE: PAL
OD_SPHERE: +2.00
OD_AXIS: 168
OD_CYLINDER: +0.75
OS_CYLINDER: SPHERE
OS_SPHERE: +2.00
OD_ADD: +2.50

## 2021-11-22 ASSESSMENT — REFRACTION_MANIFEST
OS_AXIS: 175
OD_ADD: +2.50
OS_SPHERE: +1.75
OD_AXIS: 017
OS_CYLINDER: +0.75
OS_ADD: +2.50
OD_CYLINDER: +1.00
OD_SPHERE: +1.75

## 2021-11-22 ASSESSMENT — CONF VISUAL FIELD
OS_NORMAL: 1
OD_NORMAL: 1

## 2021-11-22 ASSESSMENT — EXTERNAL EXAM - LEFT EYE: OS_EXAM: NORMAL

## 2021-11-22 ASSESSMENT — SLIT LAMP EXAM - LIDS: COMMENTS: 2+ DERMATOCHALASIS

## 2021-11-22 ASSESSMENT — EXTERNAL EXAM - RIGHT EYE: OD_EXAM: NORMAL

## 2021-11-22 ASSESSMENT — VISUAL ACUITY
OD_CC: 20/25
CORRECTION_TYPE: GLASSES
OS_CC+: -3
OS_CC: 20/20
OD_CC+: -1
METHOD: SNELLEN - LINEAR

## 2021-11-22 ASSESSMENT — CUP TO DISC RATIO
OS_RATIO: 0.6
OD_RATIO: 0.6

## 2021-11-22 ASSESSMENT — TONOMETRY
IOP_METHOD: APPLANATION
OD_IOP_MMHG: 16
OS_IOP_MMHG: 16

## 2021-11-22 NOTE — LETTER
11/22/2021         RE: Florencia Ross  [id number] [id number]        Dear Colleague,    Thank you for referring your patient, Florencia Ross, to the [facility]. Please see a copy of my visit note below.     Current Eye Medications: visine allergy drops prn for allergies     Subjective:  Here for complete eye exam. Has a Right upper eyelid pimple that has been getting bigger she wants looked at. Mother had glaucoma      Objective:  See Ophthalmology Exam.       Assessment:  Florencia Ross is a 71 year old female who presents with:   Encounter Diagnoses   Name Primary?     Examination of eyes and vision      Hyperopia of both eyes with regular astigmatism and presbyopia        Nuclear sclerotic cataract of both eyes Not visually significant      Dermatochalasis of both upper eyelids        Plan:  Ok to continue allergy drop as needed     Glasses prescription given - optional to update    Brenda Apple MD  [phone]          Again, thank you for allowing me to participate in the care of your patient.        Sincerely,        Brenda Apple MD

## 2021-11-22 NOTE — PROGRESS NOTES
Current Eye Medications: visine allergy drops prn for allergies     Subjective:  Here for complete eye exam. Has a Right upper eyelid pimple that has been getting bigger she wants looked at. Mother had glaucoma      Objective:  See Ophthalmology Exam.       Assessment:  Florencia Ross is a 71 year old female who presents with:   Encounter Diagnoses   Name Primary?     Examination of eyes and vision      Hyperopia of both eyes with regular astigmatism and presbyopia        Nuclear sclerotic cataract of both eyes Not visually significant      Dermatochalasis of both upper eyelids        Plan:  Ok to continue allergy drop as needed     Glasses prescription given - optional to update    Brenda Apple MD  [phone]

## 2021-11-22 NOTE — PATIENT INSTRUCTIONS
Ok to continue allergy drop as needed     Glasses prescription given - optional to update    Brenda Apple MD  [phone]

## 2021-12-06 ENCOUNTER — MYC MEDICAL ADVICE (OUTPATIENT)
Dept: FAMILY MEDICINE | Facility: CLINIC | Age: 71
End: 2021-12-06
Payer: COMMERCIAL

## 2021-12-06 DIAGNOSIS — H90.5 SENSORY HEARING LOSS, UNILATERAL: Primary | ICD-10-CM

## 2021-12-08 NOTE — TELEPHONE ENCOUNTER
FUTURE VISIT INFORMATION      FUTURE VISIT INFORMATION:    Date: 3/1/2022    Time: 9:30AM    Location: [facility]  REFERRAL INFORMATION:    Referring provider:  Shanel Aguiar MD    Referring providers clinic:  [facility]    Reason for visit/diagnosis  WIN prior - Sensory hearing loss, unilateral, no recs, ref by Shanel Aguiar MD in [facility], appt made by pt    RECORDS REQUESTED FROM:       Clinic name Comments Records Status Imaging Status   [facility] 12/6/2021 referral  [place] [address] Ear, Nose, and Throat  12/08/2004 note from Carlene Kim PA-C Care everywhere

## 2021-12-13 ASSESSMENT — ENCOUNTER SYMPTOMS
JOINT SWELLING: 1
BREAST MASS: 0

## 2021-12-13 ASSESSMENT — ACTIVITIES OF DAILY LIVING (ADL): CURRENT_FUNCTION: NO ASSISTANCE NEEDED

## 2021-12-13 NOTE — PROGRESS NOTES
"     SUBJECTIVE:   CC: Florencia Ross is an 71 year old woman who presents for preventive health visit.       Patient has been advised of split billing requirements and indicates understanding: Yes  Healthy Habits:     In general, how would you rate your overall health?  Good    Frequency of exercise:  1 day/week    Duration of exercise:  Less than 15 minutes    Do you usually eat at least 4 servings of fruit and vegetables a day, include whole grains    & fiber and avoid regularly eating high fat or \"junk\" foods?  Yes    Taking medications regularly:  Yes    Ability to successfully perform activities of daily living:  No assistance needed    Home Safety:  No safety concerns identified    Hearing Impairment:  Difficulty following a conversation in a noisy restaurant or crowded room, feel that people are mumbling or not speaking clearly and need to ask people to speak up or repeat themselves    In the past 6 months, have you been bothered by leaking of urine?  No    In general, how would you rate your overall mental or emotional health?  Excellent      PHQ-2 Total Score: 0    Additional concerns today:  Yes      Pt with HTN on meds. It is well controlled on meds  Needs refill of meds    Pt with herpes, needs refill of acyclovir. It is working well to control outbreaks        Today's PHQ-2 Score:   PHQ-2 (  [employer]) 2021   Q1: Little interest or pleasure in doing things 0   Q2: Feeling down, depressed or hopeless 0   PHQ-2 Score 0   PHQ-2 Total Score (12-17 Years)- Positive if 3 or more points; Administer PHQ-A if positive 0   Q1: Little interest or pleasure in doing things Not at all   Q2: Feeling down, depressed or hopeless Not at all   PHQ-2 Score 0       Abuse: Current or Past (Physical, Sexual or Emotional) - No  Do you feel safe in your environment? YES        Social History     Tobacco Use     Smoking status: Former Smoker     Quit date: 1977     Years since quittin.4     Smokeless tobacco: " Never Used   Substance Use Topics     Alcohol use: Yes     Comment: 5-7 drinks a week         Alcohol Use 12/15/2020   Prescreen: >3 drinks/day or >7 drinks/week? No   Prescreen: >3 drinks/day or >7 drinks/week? -       Reviewed orders with patient.  Reviewed health maintenance and updated orders accordingly - Yes  Labs reviewed in EPIC    Breast Cancer Screening:    FHS-7: No flowsheet data found.    Mammogram Screening: Recommended mammography every 1-2 years with patient discussion and risk factor consideration  Pertinent mammograms are reviewed under the imaging tab.    History of abnormal Pap smear: NO - age 65 - see link Cervical Cytology Screening Guidelines     Reviewed and updated as needed this visit by clinical staff                Reviewed and updated as needed this visit by Provider                   Review of Systems   HENT: Positive for ear pain and hearing loss.    Breasts:  Negative for tenderness, breast mass and discharge.   Genitourinary: Negative for pelvic pain, vaginal bleeding and vaginal discharge.   Musculoskeletal: Positive for joint swelling.     CONSTITUTIONAL: NEGATIVE for fever, chills, change in weight  INTEGUMENTARY/SKIN: NEGATIVE for worrisome rashes, moles or lesions  EYES: NEGATIVE for vision changes or irritation  ENT: NEGATIVE for ear, mouth and throat problems  RESP: NEGATIVE for significant cough or SOB  BREAST: NEGATIVE for masses, tenderness or discharge  CV: NEGATIVE for chest pain, palpitations or peripheral edema  GI: NEGATIVE for nausea, abdominal pain, heartburn, or change in bowel habits  : NEGATIVE for unusual urinary or vaginal symptoms. No vaginal bleeding.  MUSCULOSKELETAL: NEGATIVE for significant arthralgias or myalgia  NEURO: NEGATIVE for weakness, dizziness or paresthesias  PSYCHIATRIC: NEGATIVE for changes in mood or affect      OBJECTIVE:   LMP  (LMP Unknown)   Physical Exam  GENERAL: healthy, alert and no distress  EYES: Eyes grossly normal to inspection,  "PERRL and conjunctivae and sclerae normal  HENT: ear canals and TM's normal, nose and mouth without ulcers or lesions  NECK: no adenopathy, no asymmetry, masses, or scars and thyroid normal to palpation  RESP: lungs clear to auscultation - no rales, rhonchi or wheezes  CV: regular rate and rhythm, normal S1 S2, no S3 or S4, no murmur, click or rub, no peripheral edema and peripheral pulses strong  ABDOMEN: soft, nontender, no hepatosplenomegaly, no masses and bowel sounds normal  MS: no gross musculoskeletal defects noted, no edema  SKIN: no suspicious lesions or rashes  NEURO: Normal strength and tone, mentation intact and speech normal  PSYCH: mentation appears normal, affect normal/bright    Diagnostic Test Results:  Labs reviewed in Epic    ASSESSMENT/PLAN:   (Z00.00) Routine general medical examination at a health care facility  (primary encounter diagnosis)  Comment:   Plan:     (I10) Hypertension goal BP (blood pressure) < 140/90  Comment: at goal on meds, follow-up in one year, labs UTD  Plan: amLODIPine (NORVASC) 5 MG tablet, losartan         (COZAAR) 50 MG tablet            (A60.00) Recurrent genital herpes  Comment: refill as is working well  Plan: acyclovir (ZOVIRAX) 400 MG tablet            (Z13.220) Screening cholesterol level  Comment:   Plan: Lipid panel reflex to direct LDL Fasting        Pt wants to come back for this      Patient has been advised of split billing requirements and indicates understanding: Yes  COUNSELING:  Reviewed preventive health counseling, as reflected in patient instructions       Regular exercise       Healthy diet/nutrition       Vision screening       Colon cancer screening    Estimated body mass index is 21.86 kg/m  as calculated from the following:    Height as of 4/6/21: 1.753 m (5' 9\").    Weight as of 10/6/21: 67.1 kg (148 lb).        She reports that she quit smoking about 44 years ago. She has never used smokeless tobacco.      Counseling Resources:  ATP IV " Guidelines  Pooled Cohorts Equation Calculator  Breast Cancer Risk Calculator  BRCA-Related Cancer Risk Assessment: FHS-7 Tool  FRAX Risk Assessment  ICSI Preventive Guidelines  Dietary Guidelines for Americans, 2010  USDA's MyPlate  ASA Prophylaxis  Lung CA Screening    Shanel Aguiar MD  [facility]

## 2021-12-13 NOTE — PATIENT INSTRUCTIONS
Preventive Health Recommendations    See your health care provider every year to    Review health changes.     Discuss preventive care.      Review your medicines if your doctor has prescribed any.      You no longer need a yearly Pap test unless you've had an abnormal Pap test in the past 10 years. If you have vaginal symptoms, such as bleeding or discharge, be sure to talk with your provider about a Pap test.      Every 1 to 2 years, have a mammogram.  If you are over 69, talk with your health care provider about whether or not you want to continue having screening mammograms.      Every 10 years, have a colonoscopy. Or, have a yearly FIT test (stool test). These exams will check for colon cancer.       Have a cholesterol test every 5 years, or more often if your doctor advises it.       Have a diabetes test (fasting glucose) every three years. If you are at risk for diabetes, you should have this test more often.       At age 65, have a bone density scan (DEXA) to check for osteoporosis (brittle bone disease).    Shots:    Get a flu shot each year.    Get a tetanus shot every 10 years.    Talk to your doctor about your pneumonia vaccines. There are now two you should receive - Pneumovax (PPSV 23) and Prevnar (PCV 13).    Talk to your pharmacist about the shingles vaccine.    Talk to your doctor about the hepatitis B vaccine.    Nutrition:     Eat at least 5 servings of fruits and vegetables each day.      Eat whole-grain bread, whole-wheat pasta and brown rice instead of white grains and rice.      Get adequate about Calcium and Vitamin D.     Lifestyle    Exercise at least 150 minutes a week (30 minutes a day, 5 days a week). This will help you control your weight and prevent disease.      Limit alcohol to one drink per day.      No smoking.       Wear sunscreen to prevent skin cancer.       See your dentist twice a year for an exam and cleaning.      See your eye doctor every 1 to 2 years to screen for  conditions such as glaucoma, macular degeneration, cataracts, etc.    Personalized Prevention Plan  You are due for the preventive services outlined below.  Your care team is available to assist you in scheduling these services.  If you have already completed any of these items, please share that information with your care team to update in your medical record.    Health Maintenance Due   Topic Date Due     ANNUAL REVIEW OF HM ORDERS  Never done     Zoster (Shingles) Vaccine (1 of 2) Never done     Annual Wellness Visit  12/15/2021     Cholesterol Lab  12/15/2021     FALL RISK ASSESSMENT  12/15/2021

## 2021-12-16 ENCOUNTER — OFFICE VISIT (OUTPATIENT)
Dept: FAMILY MEDICINE | Facility: CLINIC | Age: 71
End: 2021-12-16
Payer: COMMERCIAL

## 2021-12-16 VITALS
HEIGHT: 69 IN | BODY MASS INDEX: 22.36 KG/M2 | RESPIRATION RATE: 18 BRPM | TEMPERATURE: 98.9 F | SYSTOLIC BLOOD PRESSURE: 136 MMHG | DIASTOLIC BLOOD PRESSURE: 62 MMHG | HEART RATE: 67 BPM | OXYGEN SATURATION: 99 % | WEIGHT: 151 LBS

## 2021-12-16 DIAGNOSIS — Z13.220 SCREENING CHOLESTEROL LEVEL: ICD-10-CM

## 2021-12-16 DIAGNOSIS — A60.00 RECURRENT GENITAL HERPES: ICD-10-CM

## 2021-12-16 DIAGNOSIS — Z00.00 ROUTINE GENERAL MEDICAL EXAMINATION AT A HEALTH CARE FACILITY: Primary | ICD-10-CM

## 2021-12-16 DIAGNOSIS — I10 HYPERTENSION GOAL BP (BLOOD PRESSURE) < 140/90: ICD-10-CM

## 2021-12-16 PROCEDURE — 99207 PR ANNUAL WELLNESS VISIT, PPS, SUBSEQUENT STAT: CPT | Performed by: FAMILY MEDICINE

## 2021-12-16 PROCEDURE — 99213 OFFICE O/P EST LOW 20 MIN: CPT | Performed by: FAMILY MEDICINE

## 2021-12-16 RX ORDER — LOSARTAN POTASSIUM 50 MG/1
50 TABLET ORAL 2 TIMES DAILY
Qty: 180 TABLET | Refills: 3 | Status: SHIPPED | OUTPATIENT
Start: 2021-12-16 | End: 2022-12-19

## 2021-12-16 RX ORDER — AMLODIPINE BESYLATE 5 MG/1
5 TABLET ORAL
Qty: 180 TABLET | Refills: 3 | Status: SHIPPED | OUTPATIENT
Start: 2021-12-16 | End: 2022-12-19

## 2021-12-16 RX ORDER — ACYCLOVIR 400 MG/1
TABLET ORAL
Qty: 180 TABLET | Refills: 3 | Status: SHIPPED | OUTPATIENT
Start: 2021-12-16 | End: 2022-12-19

## 2021-12-16 ASSESSMENT — ENCOUNTER SYMPTOMS
BREAST MASS: 0
JOINT SWELLING: 1

## 2021-12-16 ASSESSMENT — ACTIVITIES OF DAILY LIVING (ADL): CURRENT_FUNCTION: NO ASSISTANCE NEEDED

## 2021-12-16 ASSESSMENT — PAIN SCALES - GENERAL: PAINLEVEL: NO PAIN (0)

## 2021-12-16 ASSESSMENT — MIFFLIN-ST. JEOR: SCORE: 1274.31

## 2021-12-28 ENCOUNTER — TRANSFERRED RECORDS (OUTPATIENT)
Dept: HEALTH INFORMATION MANAGEMENT | Facility: CLINIC | Age: 71
End: 2021-12-28
Payer: COMMERCIAL

## 2022-01-18 ENCOUNTER — TRANSFERRED RECORDS (OUTPATIENT)
Dept: HEALTH INFORMATION MANAGEMENT | Facility: CLINIC | Age: 72
End: 2022-01-18
Payer: COMMERCIAL

## 2022-01-31 DIAGNOSIS — Z01.10 ENCOUNTER FOR HEARING TEST: Primary | ICD-10-CM

## 2022-03-01 ENCOUNTER — OFFICE VISIT (OUTPATIENT)
Dept: AUDIOLOGY | Facility: CLINIC | Age: 72
End: 2022-03-01
Payer: COMMERCIAL

## 2022-03-01 ENCOUNTER — OFFICE VISIT (OUTPATIENT)
Dept: OTOLARYNGOLOGY | Facility: CLINIC | Age: 72
End: 2022-03-01
Payer: COMMERCIAL

## 2022-03-01 ENCOUNTER — PRE VISIT (OUTPATIENT)
Dept: OTOLARYNGOLOGY | Facility: CLINIC | Age: 72
End: 2022-03-01

## 2022-03-01 DIAGNOSIS — Z01.10 ENCOUNTER FOR HEARING TEST: ICD-10-CM

## 2022-03-01 DIAGNOSIS — H90.3 SENSORINEURAL HEARING LOSS, BILATERAL: Primary | ICD-10-CM

## 2022-03-01 DIAGNOSIS — H90.3 BILATERAL SENSORINEURAL HEARING LOSS: Primary | ICD-10-CM

## 2022-03-01 DIAGNOSIS — H93.13 TINNITUS, BILATERAL: ICD-10-CM

## 2022-03-01 DIAGNOSIS — H93.8X1 EAR PRESSURE, RIGHT: ICD-10-CM

## 2022-03-01 DIAGNOSIS — H90.5 SENSORY HEARING LOSS, UNILATERAL: ICD-10-CM

## 2022-03-01 DIAGNOSIS — H61.21 IMPACTED CERUMEN OF RIGHT EAR: ICD-10-CM

## 2022-03-01 PROCEDURE — 69210 REMOVE IMPACTED EAR WAX UNI: CPT | Performed by: OTOLARYNGOLOGY

## 2022-03-01 PROCEDURE — 99203 OFFICE O/P NEW LOW 30 MIN: CPT | Mod: 25 | Performed by: OTOLARYNGOLOGY

## 2022-03-01 PROCEDURE — 92557 COMPREHENSIVE HEARING TEST: CPT | Performed by: AUDIOLOGIST

## 2022-03-01 PROCEDURE — 92550 TYMPANOMETRY & REFLEX THRESH: CPT | Performed by: AUDIOLOGIST

## 2022-03-01 ASSESSMENT — PAIN SCALES - GENERAL: PAINLEVEL: NO PAIN (0)

## 2022-03-01 NOTE — PROGRESS NOTES
Dear Shanel Zazueta:    I had the pleasure of meeting Florencia Ross in consultation today at the [facility] at your request.    CHIEF COMPLAINT: Right ear pressure, right hearing loss    HISTORY OF PRESENT ILLNESS: Patient is a 71-year-old in today for consultation on ears and hearing, along with right ear pressure referred from her family physician.  She has had pressure in the right ear along with suspected right hearing loss since last fall.  Also had a foreign body sensation.  She felt like she could not pop the ear.  Local physician did see some cerumen and gave her eardrops to use to try to help clear it.  Maybe a little better but still present.  She does notice some hearing issues in crowds and at restaurants, etc.  Uses close caption on TV.  She has had bilateral tinnitus for years, right may be slightly worse than the left, not problematic.  She denies any dizziness or balance concerns.  There is no dysphagia, hoarseness, facial paresthesias.  She has been retired for about 5 years, no noise exposure at work.  She was around a lot of noise when she was younger.    ALLERGIES:    Allergies   Allergen Reactions     Corn Oil Anaphylaxis     When aerated      Food Hives     Raspberries, asparagus     Dye [Contrast Dye]      Iodine      Other reaction(s): Other, see comments  Stops breathing.       HABITS: Social History    Substance and Sexual Activity      Alcohol use: Yes        Comment: 5-7 drinks a week     History   Smoking Status     Former Smoker     Packs/day: 0.00     Years: 0.00     Quit date: 7/11/1977   Smokeless Tobacco     Never Used         PAST MEDICAL HISTORY: Please see today's intake form (for the remainder of the PMH) which I reviewed and signed.  Past Medical History:   Diagnosis Date     Heart murmur      Hypertension      Nonsenile cataract        FAMILY HISTORY/SOCIAL HISTORY:   Family History   Problem Relation Age of Onset      Cerebrovascular Disease Mother      Glaucoma Mother 80        old age      Hypertension Father      Macular Degeneration No family hx of       Social History     Socioeconomic History     Marital status:      Spouse name: Not on file     Number of children: Not on file     Years of education: Not on file     Highest education level: Not on file   Occupational History     Not on file   Tobacco Use     Smoking status: Former Smoker     Packs/day: 0.00     Years: 0.00     Pack years: 0.00     Quit date: 1977     Years since quittin.6     Smokeless tobacco: Never Used   Substance and Sexual Activity     Alcohol use: Yes     Comment: 5-7 drinks a week     Drug use: No     Sexual activity: Yes     Partners: Male     Birth control/protection: Post-menopausal   Other Topics Concern     Parent/sibling w/ CABG, MI or angioplasty before 65F 55M? No      Service No     Blood Transfusions No     Caffeine Concern No     Occupational Exposure No     Hobby Hazards No     Sleep Concern No     Stress Concern No     Weight Concern No     Special Diet No     Back Care Yes     Comment: chiro     Exercise Yes     Comment: walk,bike golf loft weights floor excersices     Bike Helmet No     Seat Belt Yes     Self-Exams Yes   Social History Narrative     Not on file     Social Determinants of Health     Financial Resource Strain: Not on file   Food Insecurity: Not on file   Transportation Needs: Not on file   Physical Activity: Not on file   Stress: Not on file   Social Connections: Not on file   Intimate Partner Violence: Not on file   Housing Stability: Not on file       REVIEW OF SYSTEMS: Patient Supplied Answers to Review of Systems   ENT ROS 3/1/2022   Constitutional Problems with sleep   Ears, Nose, Throat Hearing loss, Ringing/noise in ears, Nasal congestion or drainage            The remainder of the 10 point ROS is negative    PHYSICIAL EXAMINATION:  Constitutional: The patient was well-groomed and in no  acute distress.   Skin: Warm and pink.  Psychiatric: The patient's affect was calm, cooperative, and appropriate.   Respiratory: Breathing comfortably without stridor or exertion of accessory muscles.  Eyes: Pupils were equal and reactive. Extraocular movement intact.   Head: Normocephalic and atraumatic. No lesions or scars.  Ears: Both ears examined she has complete impaction on the right side and significant cerumen on the left.  She is placed on the microscope and under high-power magnification right side was examined and impacted material was mobilized with a right angled hook initially.  This was then cleaned with alligator forceps and curette.  With manipulation pachymetry was finally cleared.  Tympanic membrane middle ear look normal after cleaning.  Left ear was cleaned of mild to moderate cerumen with curette.  Tympanic membrane middle ear normal after cleaning.  Nose: Sinuses were nontender. Anterior rhinoscopy revealed midline septum and absence of purulence or polyps.  Oral Cavity: Normal tongue, floor of mouth, buccal mucosa, and palate. No lesions or masses on inspection or palpation. No abnormal lymph tissue in the oropharynx.   Neck: The parotid is soft without masses. Supple with normal laryngeal and tracheal landmarks.   Lymphatic: There is no palpable lymphadenopathy or other masses in the neck.   Neurologic: Alert and oriented x 3. Cranial nerves III-XI within normal limits. Voice quality normal.  Cerebellar Function Tests:  Grossly normal    Audiogram: Audiogram shows a moderate to severe downsloping high-frequency sensorineural hearing loss above 1000 Hz.  Fairly symmetrical.  Excellent discrimination at 100% in each ear.  Normal type A tympanograms bilaterally.  Tuning forks are normal.      IMPRESSION AND PLAN:   1. Right impacted cerumen: Feel this is the cause for the recent right ear pressure and foreign body sensation.  Cleaned today with microscope and instruments as above.  Normal  appearance after cleaning, no further treatment needed, monitor.  We will send a card in 1 year just to remind her to check ear, especially with any symptoms.  2. Bilateral sensorineural hearing loss: Discussed this in detail with her today.  Excellent candidate for hearing aids with good discrimination.  She will pursue this at her discretion place location.  Protect ears from noise, monitor.  3. Bilateral tinnitus: Secondary sensorineural hearing loss, not problematic, no treatment needed, monitor.  4. Right ear pressure: Secondary to impacted material, improved after cleaning, monitor.    Thank you very much for the opportunity to participate in the care of your patient.    Rick L Nissen MD

## 2022-03-01 NOTE — NURSING NOTE
Chief Complaint   Patient presents with     Consult     sensorineural hearing loss        Bill Engle LPN

## 2022-03-01 NOTE — PROGRESS NOTES
AUDIOLOGY REPORT    SUMMARY: Audiology visit completed. See audiogram for results.      RECOMMENDATIONS: Follow-up with ENT.    Alexys Tellez, [facility]  Licensed Audiologist  [place] #[id number]

## 2022-03-01 NOTE — LETTER
3/1/2022       RE: Florencia Ross  [address] [address]     Dear Colleague,    Thank you for referring your patient, Florencia Ross, to the [facility] [place] [facility]. Please see a copy of my visit note below.    Dear Shanel Zazueta:    I had the pleasure of meeting Florencia Ross in consultation today at the [facility] at your request.    CHIEF COMPLAINT: Right ear pressure, right hearing loss    HISTORY OF PRESENT ILLNESS: Patient is a 71-year-old in today for consultation on ears and hearing, along with right ear pressure referred from her family physician.  She has had pressure in the right ear along with suspected right hearing loss since last fall.  Also had a foreign body sensation.  She felt like she could not pop the ear.  Local physician did see some cerumen and gave her eardrops to use to try to help clear it.  Maybe a little better but still present.  She does notice some hearing issues in crowds and at restaurants, etc.  Uses close caption on TV.  She has had bilateral tinnitus for years, right may be slightly worse than the left, not problematic.  She denies any dizziness or balance concerns.  There is no dysphagia, hoarseness, facial paresthesias.  She has been retired for about 5 years, no noise exposure at work.  She was around a lot of noise when she was younger.    ALLERGIES:    Allergies   Allergen Reactions     Corn Oil Anaphylaxis     When aerated      Food Hives     Raspberries, asparagus     Dye [Contrast Dye]      Iodine      Other reaction(s): Other, see comments  Stops breathing.       HABITS: Social History    Substance and Sexual Activity      Alcohol use: Yes        Comment: 5-7 drinks a week     History   Smoking Status     Former Smoker     Packs/day: 0.00     Years: 0.00     Quit date: 7/11/1977   Smokeless Tobacco      Never Used         PAST MEDICAL HISTORY: Please see today's intake form (for the remainder of the PMH) which I reviewed and signed.  Past Medical History:   Diagnosis Date     Heart murmur      Hypertension      Nonsenile cataract        FAMILY HISTORY/SOCIAL HISTORY:   Family History   Problem Relation Age of Onset     Cerebrovascular Disease Mother      Glaucoma Mother 80        old age      Hypertension Father      Macular Degeneration No family hx of       Social History     Socioeconomic History     Marital status:      Spouse name: Not on file     Number of children: Not on file     Years of education: Not on file     Highest education level: Not on file   Occupational History     Not on file   Tobacco Use     Smoking status: Former Smoker     Packs/day: 0.00     Years: 0.00     Pack years: 0.00     Quit date: 1977     Years since quittin.6     Smokeless tobacco: Never Used   Substance and Sexual Activity     Alcohol use: Yes     Comment: 5-7 drinks a week     Drug use: No     Sexual activity: Yes     Partners: Male     Birth control/protection: Post-menopausal   Other Topics Concern     Parent/sibling w/ CABG, MI or angioplasty before 65F 55M? No      Service No     Blood Transfusions No     Caffeine Concern No     Occupational Exposure No     Hobby Hazards No     Sleep Concern No     Stress Concern No     Weight Concern No     Special Diet No     Back Care Yes     Comment: chiro     Exercise Yes     Comment: walk,bike golf loft weights floor excersices     Bike Helmet No     Seat Belt Yes     Self-Exams Yes   Social History Narrative     Not on file     Social Determinants of Health     Financial Resource Strain: Not on file   Food Insecurity: Not on file   Transportation Needs: Not on file   Physical Activity: Not on file   Stress: Not on file   Social Connections: Not on file   Intimate Partner Violence: Not on file   Housing Stability: Not on file       REVIEW OF SYSTEMS: Patient  Supplied Answers to Review of Systems   ENT ROS 3/1/2022   Constitutional Problems with sleep   Ears, Nose, Throat Hearing loss, Ringing/noise in ears, Nasal congestion or drainage            The remainder of the 10 point ROS is negative    PHYSICIAL EXAMINATION:  Constitutional: The patient was well-groomed and in no acute distress.   Skin: Warm and pink.  Psychiatric: The patient's affect was calm, cooperative, and appropriate.   Respiratory: Breathing comfortably without stridor or exertion of accessory muscles.  Eyes: Pupils were equal and reactive. Extraocular movement intact.   Head: Normocephalic and atraumatic. No lesions or scars.  Ears: Both ears examined she has complete impaction on the right side and significant cerumen on the left.  She is placed on the microscope and under high-power magnification right side was examined and impacted material was mobilized with a right angled hook initially.  This was then cleaned with alligator forceps and curette.  With manipulation pachymetry was finally cleared.  Tympanic membrane middle ear look normal after cleaning.  Left ear was cleaned of mild to moderate cerumen with curette.  Tympanic membrane middle ear normal after cleaning.  Nose: Sinuses were nontender. Anterior rhinoscopy revealed midline septum and absence of purulence or polyps.  Oral Cavity: Normal tongue, floor of mouth, buccal mucosa, and palate. No lesions or masses on inspection or palpation. No abnormal lymph tissue in the oropharynx.   Neck: The parotid is soft without masses. Supple with normal laryngeal and tracheal landmarks.   Lymphatic: There is no palpable lymphadenopathy or other masses in the neck.   Neurologic: Alert and oriented x 3. Cranial nerves III-XI within normal limits. Voice quality normal.  Cerebellar Function Tests:  Grossly normal    Audiogram: Audiogram shows a moderate to severe downsloping high-frequency sensorineural hearing loss above 1000 Hz.  Fairly symmetrical.   Excellent discrimination at 100% in each ear.  Normal type A tympanograms bilaterally.  Tuning forks are normal.      IMPRESSION AND PLAN:   1. Right impacted cerumen: Feel this is the cause for the recent right ear pressure and foreign body sensation.  Cleaned today with microscope and instruments as above.  Normal appearance after cleaning, no further treatment needed, monitor.  We will send a card in 1 year just to remind her to check ear, especially with any symptoms.  2. Bilateral sensorineural hearing loss: Discussed this in detail with her today.  Excellent candidate for hearing aids with good discrimination.  She will pursue this at her discretion place location.  Protect ears from noise, monitor.  3. Bilateral tinnitus: Secondary sensorineural hearing loss, not problematic, no treatment needed, monitor.  4. Right ear pressure: Secondary to impacted material, improved after cleaning, monitor.    Thank you very much for the opportunity to participate in the care of your patient.      Rick L Nissen MD

## 2022-03-01 NOTE — PATIENT INSTRUCTIONS
1. You were seen in the ENT Clinic today by Dr. Nissen.  If you have any questions or concerns after your appointment, please call   - Option 1: ENT Clinic: [phone]   - Option 2: Angela (Dr. Nissen's Nurse): [phone]                   Antonietta(Dr. Nissen's Nurse): [phone]      2.   Plan to return to clinic in 1 year with hearing test    Angela Perdue LPN  [facility] - Otolaryngology

## 2022-04-18 ENCOUNTER — TELEPHONE (OUTPATIENT)
Dept: FAMILY MEDICINE | Facility: CLINIC | Age: 72
End: 2022-04-18
Payer: COMMERCIAL

## 2022-04-18 NOTE — TELEPHONE ENCOUNTER
Patient calling to see if she is due for her lipids.   This RN sees patients last lipids were Dec 2020 so patient is overdue.  There is already an order in Epic.   Warm transferred patient to scheduling to make fasting lab appointment.     Arielle MELON, RN

## 2022-05-03 ENCOUNTER — LAB (OUTPATIENT)
Dept: LAB | Facility: CLINIC | Age: 72
End: 2022-05-03
Payer: COMMERCIAL

## 2022-05-03 DIAGNOSIS — Z13.220 SCREENING CHOLESTEROL LEVEL: ICD-10-CM

## 2022-05-03 LAB
CHOLEST SERPL-MCNC: 210 MG/DL
FASTING STATUS PATIENT QL REPORTED: YES
HDLC SERPL-MCNC: 62 MG/DL
LDLC SERPL CALC-MCNC: 135 MG/DL
NONHDLC SERPL-MCNC: 148 MG/DL
TRIGL SERPL-MCNC: 65 MG/DL

## 2022-05-03 PROCEDURE — 36415 COLL VENOUS BLD VENIPUNCTURE: CPT

## 2022-05-03 PROCEDURE — 80061 LIPID PANEL: CPT

## 2022-07-28 ENCOUNTER — OFFICE VISIT (OUTPATIENT)
Dept: FAMILY MEDICINE | Facility: OTHER | Age: 72
End: 2022-07-28
Attending: NURSE PRACTITIONER
Payer: COMMERCIAL

## 2022-07-28 VITALS
TEMPERATURE: 98.4 F | OXYGEN SATURATION: 97 % | BODY MASS INDEX: 22.67 KG/M2 | RESPIRATION RATE: 16 BRPM | HEART RATE: 64 BPM | DIASTOLIC BLOOD PRESSURE: 74 MMHG | WEIGHT: 153.5 LBS | SYSTOLIC BLOOD PRESSURE: 132 MMHG

## 2022-07-28 DIAGNOSIS — L21.9 SEBORRHEIC DERMATITIS: Primary | ICD-10-CM

## 2022-07-28 PROCEDURE — G0463 HOSPITAL OUTPT CLINIC VISIT: HCPCS

## 2022-07-28 PROCEDURE — 99203 OFFICE O/P NEW LOW 30 MIN: CPT | Performed by: NURSE PRACTITIONER

## 2022-07-28 RX ORDER — CICLOPIROX 1 G/100ML
SHAMPOO TOPICAL
Qty: 120 ML | Refills: 0 | Status: SHIPPED | OUTPATIENT
Start: 2022-07-28 | End: 2022-12-19

## 2022-07-28 ASSESSMENT — PAIN SCALES - GENERAL: PAINLEVEL: NO PAIN (0)

## 2022-07-28 NOTE — PROGRESS NOTES
ASSESSMENT/PLAN:    I have reviewed the nursing notes.  I have reviewed the findings, diagnosis, plan and need for follow up with the patient.    1. Seborrheic dermatitis  Scaly, slightly yellow tinged plaques present in scalp. Already using ketoconazole with minimal improvement purchased OTC, opted to prescribe ciclopirox to see if this one works better for her. Consistent with seborrheic dermatitis. If no improvement recommend f/u with PCP back home. I provided reassurance to Florencia that the lesions she has on scalp are not consistent characteristics with malignant skin cancer.   - ciclopirox 1 % SHAM; Externally apply topically twice a week  Dispense: 120 mL; Refill: 0    Discussed warning signs/symptoms indicative of need to f/u    Follow up if symptoms persist or worsen or concerns    I explained my diagnostic considerations and recommendations to the patient, who voiced understanding and agreement with the treatment plan. All questions were answered. We discussed potential side effects of any prescribed or recommended therapies, as well as expectations for response to treatments.    Lyn Durbin NP  7/28/2022  3:40 PM    HPI:  Florencia Ross is a 71 year old female who presents to [facility] today for concerns of spots on her scalp over 1 week ago that the  she goes to noticed. Florencia probably would not have noticed them if the  had not pointed them out she stated, but now she notices they do itch a bit. Used nizoral shampoo but it did not get any better, she has been using it every day for about 2 weeks already (it is ketoconazole 1% shampoo). Purchased otc.       Past Medical History:   Diagnosis Date     Heart murmur      Hypertension      Nonsenile cataract      Past Surgical History:   Procedure Laterality Date     COLONOSCOPY       COLONOSCOPY  03/14/2019    4 polyps     COLONOSCOPY N/A 3/14/2019    Procedure: Combined Colonoscopy, Single Or Multiple Biopsy/Polypectomy By Biopsy;   Surgeon: Arun Ruano MD;  Location: [facility] OR     COLONOSCOPY WITH CO2 INSUFFLATION N/A 3/14/2019    Procedure: COLONOSCOPY WITH CO2 INSUFFLATION;  Surgeon: Arun Ruano MD;  Location: [facility] OR     COMBINED CYSTOSCOPY, INSERT STENT URETER(S) Right 2019    Procedure: CYSTOSCOPY, WITH URETERAL STENT INSERTION;  Surgeon: Ramiro Espitia MD;  Location: [facility] OR     COMBINED CYSTOSCOPY, URETEROSCOPY, LASER HOLMIUM LITHOTRIPSY URETER(S) Right 2019    Procedure: COMBINED CYSTOSCOPY, URETEROSCOPY, BASKET REMOVAL OF LESION;  Surgeon: Ramiro Espitia MD;  Location: [facility] OR     GYN SURGERY      c section     Social History     Tobacco Use     Smoking status: Former Smoker     Packs/day: 0.00     Years: 0.00     Pack years: 0.00     Quit date: 1977     Years since quittin.0     Smokeless tobacco: Never Used   Substance Use Topics     Alcohol use: Yes     Comment: 5-7 drinks a week     Current Outpatient Medications   Medication Sig Dispense Refill     acyclovir (ZOVIRAX) 400 MG tablet TAKE 1 TABLET BY MOUTH TWICE A  tablet 3     amLODIPine (NORVASC) 5 MG tablet Take 1 tablet (5 mg) by mouth 2 times daily 180 tablet 3     aspirin 81 MG EC tablet Take 1 tablet by mouth daily. 90 tablet 3     Cholecalciferol (VITAMIN D-3) 1000 UNITS CAPS 1,000 Units       losartan (COZAAR) 50 MG tablet Take 1 tablet (50 mg) by mouth 2 times daily 180 tablet 3     Multiple Vitamins-Calcium (ONE-A-DAY WOMENS PO) Take  by mouth.       Allergies   Allergen Reactions     Corn Oil Anaphylaxis     When aerated      Food Hives     Raspberries, asparagus     Dye [Contrast Dye]      Iodine      Other reaction(s): Other, see comments  Stops breathing.     Past medical history, past surgical history, current medications and allergies reviewed and accurate to the best of my knowledge.      ROS:  Refer to HPI    /74 (BP Location: Left arm, Patient Position: Sitting, Cuff Size: Adult Regular)   Pulse 64    Temp 98.4  F (36.9  C) (Tympanic)   Resp 16   Wt 69.6 kg (153 lb 8 oz)   LMP  (LMP Unknown)   SpO2 97%   BMI 22.67 kg/m      EXAM:  General Appearance: Well appearing 71 year old female, appropriate appearance for age. No acute distress   Respiratory:   No increased work of breathing.  No cough appreciated.  Dermatological: scalp: there are several yellow colored, scaly plaques < 1 cm each in diameter observed on scalp / in hair line.   Psychological: normal affect, alert, oriented, and pleasant.

## 2022-07-28 NOTE — NURSING NOTE
Pt states that she noticed spots on scalp over a week ago.  Used Nizoral shampoo to help but did not get any better

## 2022-09-03 ENCOUNTER — HEALTH MAINTENANCE LETTER (OUTPATIENT)
Age: 72
End: 2022-09-03

## 2022-09-29 ENCOUNTER — OFFICE VISIT (OUTPATIENT)
Dept: FAMILY MEDICINE | Facility: CLINIC | Age: 72
End: 2022-09-29
Payer: COMMERCIAL

## 2022-09-29 VITALS
WEIGHT: 155 LBS | HEART RATE: 63 BPM | RESPIRATION RATE: 16 BRPM | BODY MASS INDEX: 22.96 KG/M2 | DIASTOLIC BLOOD PRESSURE: 76 MMHG | HEIGHT: 69 IN | OXYGEN SATURATION: 97 % | SYSTOLIC BLOOD PRESSURE: 132 MMHG

## 2022-09-29 DIAGNOSIS — L21.9 SEBORRHEIC DERMATITIS: Primary | ICD-10-CM

## 2022-09-29 DIAGNOSIS — B36.9 FUNGAL INFECTION OF SKIN: ICD-10-CM

## 2022-09-29 DIAGNOSIS — L30.9 DERMATITIS: ICD-10-CM

## 2022-09-29 PROCEDURE — 99213 OFFICE O/P EST LOW 20 MIN: CPT | Performed by: FAMILY MEDICINE

## 2022-09-29 RX ORDER — CETIRIZINE HYDROCHLORIDE 10 MG/1
10 TABLET ORAL DAILY
Qty: 30 TABLET | Refills: 3 | Status: SHIPPED | OUTPATIENT
Start: 2022-09-29 | End: 2022-12-19

## 2022-09-29 NOTE — PROGRESS NOTES
Assessment & Plan     Seborrheic dermatitis    Patient's had several episodes of seborrheic dermatitis which are been treated successfully with a prescription shampoo and Nizoral she currently does not have any evidence of seborrheic dermatitis on her scalp she has several areas where she has been scratching.  I have prescribed cetirizine for her.  She understands if the areas of involvement become larger she may benefit from an ointment as well.    Dermatitis    Patient admits to itching her scalp when she feels irritated I have asked her not to wash her scalp more than 3 times a week as this could dry her scalp she is also been itching her skin primarily areas where her fungal infections been present    Fungal infection of skin  1 area involvement noted in between Breast the topical antifungal medication has been working some signs of a excoriation noted around area of involvement which is small and measures less than 0.5 x 0.5 cm          Subjective   Florencia is a 72 year old, presenting for the following health issues:  Fungal Infection (On scalp)    History of presenting illness  72-year-old female here to have a scalp rash evaluated has been present for about 3 and half months has been intermittent she has tried using Nizoral shampoo and then obtain a medicated prescription lotion which helps with the rash her scalp still itchy.  She is concerned that she may not be able to get her hairstylist to cut her hair her  has not noticed any new lesions in the scalp area.  She also has a small area of a fungal infection located in between her breasts or around her breast she notes no other areas of fungal involvement or infection however her skin is very itchy.  Prior to July she never had the symptoms  She denies any loss of hair.  History of Present Illness       Reason for visit:  Scalp fungus    She eats 2-3 servings of fruits and vegetables daily.She consumes 0 sweetened beverage(s) daily.She exercises with  "enough effort to increase her heart rate 9 or less minutes per day.  She exercises with enough effort to increase her heart rate 3 or less days per week.   She is taking medications regularly.             Review of Systems   Constitutional, HEENT, cardiovascular, pulmonary, gi and gu systems are negative, except as otherwise noted.      Objective    /76   Pulse 63   Resp 16   Ht 1.753 m (5' 9\")   Wt 70.3 kg (155 lb)   LMP  (LMP Unknown)   SpO2 97%   BMI 22.89 kg/m    Body mass index is 22.89 kg/m .  Physical Exam   GENERAL: healthy, alert and no distress  NECK: no adenopathy, no asymmetry, masses, or scars and thyroid normal to palpation  SKIN: no suspicious lesions or rashes and erythema - scalp and trunk   Localized focal rash present approximately 4 cm superior to right breast measuring 1 x 8.5 cm  PSYCH: mentation appears normal, affect normal/bright                    "

## 2022-10-06 ENCOUNTER — TRANSFERRED RECORDS (OUTPATIENT)
Dept: HEALTH INFORMATION MANAGEMENT | Facility: CLINIC | Age: 72
End: 2022-10-06

## 2022-12-15 NOTE — PROGRESS NOTES
"SUBJECTIVE:   Florencia is a 72 year old who presents for Preventive Visit.    Are you in the first 12 months of your Medicare coverage?  No    Healthy Habits:     In general, how would you rate your overall health?  Good    Frequency of exercise:  2-3 days/week    Duration of exercise:  15-30 minutes    Do you usually eat at least 4 servings of fruit and vegetables a day, include whole grains    & fiber and avoid regularly eating high fat or \"junk\" foods?  No    Taking medications regularly:  Yes    Medication side effects:  None    Ability to successfully perform activities of daily living:  No assistance needed    Home Safety:  No safety concerns identified    Hearing Impairment:  Difficulty following a conversation in a noisy restaurant or crowded room and find that men's voices are easier to understand than woman's    In the past 6 months, have you been bothered by leaking of urine?  No    In general, how would you rate your overall mental or emotional health?  Excellent      PHQ-2 Total Score: 0    Additional concerns today:  Yes      Have you ever done Advance Care Planning? (For example, a Health Directive, POLST, or a discussion with a medical provider or your loved ones about your wishes): Yes, patient states has an Advance Care Planning document and will bring a copy to the clinic.       Fall risk  Fallen 2 or more times in the past year?: Yes  Any fall with injury in the past year?: No  Timed Up and Go Test (>13.5 is fall risk; contact physician) : 8    Cognitive Screening No concerns based on direct observation    Do you have sleep apnea, excessive snoring or daytime drowsiness?: no    Reviewed and updated as needed this visit by clinical staff   Tobacco  Allergies  Meds              Reviewed and updated as needed this visit by Provider                 Social History     Tobacco Use     Smoking status: Former     Packs/day: 0.00     Years: 0.00     Pack years: 0.00     Types: Cigarettes     Quit date: " 1977     Years since quittin.4     Smokeless tobacco: Never   Substance Use Topics     Alcohol use: Yes     Comment: 5-7 drinks a week     If you drink alcohol do you typically have >3 drinks per day or >7 drinks per week? No    Alcohol Use 2022   Prescreen: >3 drinks/day or >7 drinks/week? No   Prescreen: >3 drinks/day or >7 drinks/week? -               Current providers sharing in care for this patient include:   Patient Care Team:  Shanel Aguiar MD as PCP - General (Family Practice)  Nissen, Rick L, MD as MD (Otolaryngology)  Tonie Gomez AuD as Audiologist (Audiology)  Shanel Aguiar MD as Assigned PCP  Nissen, Rick L, MD as Assigned Surgical Provider    The following health maintenance items are reviewed in Epic and correct as of today:  Health Maintenance   Topic Date Due     ANNUAL REVIEW OF HM ORDERS  Never done     ZOSTER IMMUNIZATION (1 of 2) Never done     COVID-19 Vaccine (5 - Booster for Pfizer series) 2022     BMP  10/06/2022     MEDICARE ANNUAL WELLNESS VISIT  2022     LIPID  2023     MAMMO SCREENING  10/25/2023     FALL RISK ASSESSMENT  2023     COLORECTAL CANCER SCREENING  2024     DTAP/TDAP/TD IMMUNIZATION (2 - Td or Tdap) 2026     ADVANCE CARE PLANNING  2027     DEXA  2034     HEPATITIS C SCREENING  Completed     PHQ-2 (once per calendar year)  Completed     INFLUENZA VACCINE  Completed     Pneumococcal Vaccine: 65+ Years  Addressed     IPV IMMUNIZATION  Aged Out     MENINGITIS IMMUNIZATION  Aged Out           Review of Systems   Constitutional: Negative for chills and fever.   HENT: Positive for hearing loss. Negative for congestion, ear pain and sore throat.    Eyes: Negative for pain and visual disturbance.   Respiratory: Negative for cough and shortness of breath.    Cardiovascular: Negative for chest pain, palpitations and peripheral edema.   Gastrointestinal: Negative for abdominal pain, constipation,  "heartburn, hematochezia and nausea.   Breasts:  Negative for tenderness, breast mass and discharge.   Genitourinary: Negative for dysuria, frequency, genital sores, hematuria, pelvic pain, urgency, vaginal bleeding and vaginal discharge.   Musculoskeletal: Positive for arthralgias. Negative for joint swelling and myalgias.   Skin: Negative for rash.   Neurological: Negative for dizziness, weakness, headaches and paresthesias.   Psychiatric/Behavioral: Negative for mood changes. The patient is not nervous/anxious.      Constitutional, HEENT, cardiovascular, pulmonary, gi and gu systems are negative, except as otherwise noted.    OBJECTIVE:   BP (!) 145/73   Pulse 65   Temp 98.7  F (37.1  C) (Oral)   Resp 16   Ht 1.753 m (5' 9\")   Wt 70.3 kg (155 lb)   LMP  (LMP Unknown)   SpO2 98%   BMI 22.89 kg/m   Estimated body mass index is 22.89 kg/m  as calculated from the following:    Height as of this encounter: 1.753 m (5' 9\").    Weight as of this encounter: 70.3 kg (155 lb).  Physical Exam  GENERAL: healthy, alert and no distress  EYES: Eyes grossly normal to inspection, PERRL and conjunctivae and sclerae normal  HENT: ear canals and TM's normal, nose and mouth without ulcers or lesions  NECK: no adenopathy, no asymmetry, masses, or scars and thyroid normal to palpation  RESP: lungs clear to auscultation - no rales, rhonchi or wheezes  CV: regular rate and rhythm, normal S1 S2, no S3 or S4, no murmur, click or rub, no peripheral edema and peripheral pulses strong  ABDOMEN: soft, nontender, no hepatosplenomegaly, no masses and bowel sounds normal  MS: no gross musculoskeletal defects noted, no edema  SKIN: no suspicious lesions or rashes  NEURO: Normal strength and tone, mentation intact and speech normal  PSYCH: mentation appears normal, affect normal/bright    Diagnostic Test Results:  Labs reviewed in Epic    ASSESSMENT / PLAN:   (Z00.00) Encounter for Medicare annual wellness exam  (primary encounter " diagnosis)  Comment:   Plan:     (A60.00) Recurrent genital herpes  Comment: refill as is working well  Plan: acyclovir (ZOVIRAX) 400 MG tablet            (I10) Hypertension goal BP (blood pressure) < 140/90  Comment: at goal  Refill meds  Plan: amLODIPine (NORVASC) 5 MG tablet, losartan         (COZAAR) 50 MG tablet, **Basic metabolic panel         FUTURE 2mo, Lipid panel reflex to direct LDL         Non-fasting, **CBC with platelets FUTURE 2mo,         EKG 12-lead complete w/read - Clinics            (Z12.31) Encounter for screening mammogram for breast cancer  Comment:   Plan: *MA Screening Digital Bilateral              Patient has been advised of split billing requirements and indicates understanding: Yes      COUNSELING:  Reviewed preventive health counseling, as reflected in patient instructions       Regular exercise       Healthy diet/nutrition       Vision screening       Dental care        She reports that she quit smoking about 45 years ago. Her smoking use included cigarettes. She has never used smokeless tobacco.      Appropriate preventive services were discussed with this patient, including applicable screening as appropriate for cardiovascular disease, diabetes, osteopenia/osteoporosis, and glaucoma.  As appropriate for age/gender, discussed screening for colorectal cancer, prostate cancer, breast cancer, and cervical cancer. Checklist reviewing preventive services available has been given to the patient.    Reviewed patients plan of care and provided an AVS. The Intermediate Care Plan ( asthma action plan, low back pain action plan, and migraine action plan) for Florencia meets the Care Plan requirement. This Care Plan has been established and reviewed with the Patient.      Shanel Aguiar MD  [facility]    Identified Health Risks:

## 2022-12-15 NOTE — PROGRESS NOTES
[facility]  [address] [address] [address]  Phone: [phone]  Primary Provider: Shanel Aguiar  Pre-op Performing Provider: SHANEL AGUIAR      PREOPERATIVE EVALUATION:  Today's date: 12/20/2022    Florencia Ross is a 72 year old female who presents for a preoperative evaluation.    Surgical Information:  Surgery/Procedure: right total knee artroplasty  Surgery Location: [facility]  Surgeon: PARIS Goetz  Surgery Date: 12/28/22  Time of Surgery: 9:30 am   Where patient plans to recover: At home with family  Fax number for surgical facility:     Type of Anesthesia Anticipated: to be determined    Assessment & Plan     The proposed surgical procedure is considered INTERMEDIATE risk.    Preop general physical exam  Per ortho    Primary osteoarthritis of right knee  Per ortho    Hypertension goal BP (blood pressure) < 140/90  Well controlled on meds             Risks and Recommendations:  The patient has the following additional risks and recommendations for perioperative complications:   - No identified additional risk factors other than previously addressed    Medication Instructions:   - ACE/ARB: May be continued on the day of surgery.    - Calcium Channel Blockers: May be continued on the day of surgery.    Stop aspirin 10 days prior to surgery    RECOMMENDATION:  APPROVAL GIVEN to proceed with proposed procedure, without further diagnostic evaluation.            Subjective     HPI related to upcoming procedure: pt to undergo right knee replacement due to osteoarhritis      Preop Questions 12/18/2022   1. Have you ever had a heart attack or stroke? No   2. Have you ever had surgery on your heart or blood vessels, such as a stent placement, a coronary artery bypass, or surgery on an artery in your head, neck, heart, or legs? No   3. Do you have chest pain with activity? No   4. Do you have a history of  heart failure? No   5. Do you currently have a cold, bronchitis or  symptoms of other infection? No   6. Do you have a cough, shortness of breath, or wheezing? No   7. Do you or anyone in your family have previous history of blood clots? No   8. Do you or does anyone in your family have a serious bleeding problem such as prolonged bleeding following surgeries or cuts? No   9. Have you ever had problems with anemia or been told to take iron pills? YES - normal hemoglobin today   10. Have you had any abnormal blood loss such as black, tarry or bloody stools, or abnormal vaginal bleeding? No   11. Have you ever had a blood transfusion? No   12. Are you willing to have a blood transfusion if it is medically needed before, during, or after your surgery? Yes   13. Have you or any of your relatives ever had problems with anesthesia? YES - trouble coming out of anesthesia   14. Do you have sleep apnea, excessive snoring or daytime drowsiness? No   15. Do you have any artifical heart valves or other implanted medical devices like a pacemaker, defibrillator, or continuous glucose monitor? No   16. Do you have artificial joints? No   17. Are you allergic to latex? No     Health Care Directive:  Patient does not have a Health Care Directive or Living Will: Patient states has Advance Directive and will bring in a copy to clinic.    Preoperative Review of :   reviewed - no record of controlled substances prescribed.      Status of Chronic Conditions:  HYPERTENSION - Patient has longstanding history of HTN , currently denies any symptoms referable to elevated blood pressure. Specifically denies chest pain, palpitations, dyspnea, orthopnea, PND or peripheral edema. Blood pressure readings have been in normal range. Current medication regimen is as listed below. Patient denies any side effects of medication.       Review of Systems  Constitutional, neuro, ENT, endocrine, pulmonary, cardiac, gastrointestinal, genitourinary, musculoskeletal, integument and psychiatric systems are negative,  except as otherwise noted.    Patient Active Problem List    Diagnosis Date Noted     Advanced directives, counseling/discussion 05/17/2018     Priority: Medium     Pt will bring in a copy. Garrett Tucker MA       Dysplastic nevus of right lower back s/p excision 7-23-15 07/23/2015     Priority: Medium     Angioma of skin 08/27/2013     Priority: Medium     Photoaging of skin 08/27/2013     Priority: Medium     Wrinkling of orbital skin 05/14/2012     Priority: Medium     Lentigo 05/14/2012     Priority: Medium     (Problem list name updated by automated process. Provider to review and confirm.)       KP (keratosis pilaris) 05/14/2012     Priority: Medium     Osteopenia 07/29/2011     Priority: Medium     Hypertension goal BP (blood pressure) < 140/90 07/11/2011     Priority: Medium     CARDIOVASCULAR SCREENING; LDL GOAL LESS THAN 130 07/11/2011     Priority: Medium     Recurrent genital herpes 07/11/2011     Priority: Medium      Past Medical History:   Diagnosis Date     Heart murmur      Hypertension      Nonsenile cataract      Past Surgical History:   Procedure Laterality Date     COLONOSCOPY       COLONOSCOPY  03/14/2019    4 polyps     COLONOSCOPY N/A 3/14/2019    Procedure: Combined Colonoscopy, Single Or Multiple Biopsy/Polypectomy By Biopsy;  Surgeon: Arun Ruano MD;  Location: [facility] OR     COLONOSCOPY WITH CO2 INSUFFLATION N/A 3/14/2019    Procedure: COLONOSCOPY WITH CO2 INSUFFLATION;  Surgeon: Arun Ruano MD;  Location: [facility] OR     COMBINED CYSTOSCOPY, INSERT STENT URETER(S) Right 8/1/2019    Procedure: CYSTOSCOPY, WITH URETERAL STENT INSERTION;  Surgeon: Ramiro Espitia MD;  Location: [facility] OR     COMBINED CYSTOSCOPY, URETEROSCOPY, LASER HOLMIUM LITHOTRIPSY URETER(S) Right 8/1/2019    Procedure: COMBINED CYSTOSCOPY, URETEROSCOPY, BASKET REMOVAL OF LESION;  Surgeon: Ramiro Espitia MD;  Location:  OR     GYN SURGERY  1983    c section     Current Outpatient Medications   Medication  Sig Dispense Refill     acyclovir (ZOVIRAX) 400 MG tablet TAKE 1 TABLET BY MOUTH TWICE A  tablet 3     amLODIPine (NORVASC) 5 MG tablet Take 1 tablet (5 mg) by mouth 2 times daily 180 tablet 3     aspirin 81 MG EC tablet Take 1 tablet by mouth daily. 90 tablet 3     cetirizine (ZYRTEC) 10 MG tablet Take 1 tablet (10 mg) by mouth daily 30 tablet 3     Cholecalciferol (VITAMIN D-3) 1000 UNITS CAPS 1,000 Units       ciclopirox 1 % SHAM Externally apply topically twice a week 120 mL 0     losartan (COZAAR) 50 MG tablet Take 1 tablet (50 mg) by mouth 2 times daily 180 tablet 3     Multiple Vitamins-Calcium (ONE-A-DAY WOMENS PO) Take  by mouth.         Allergies   Allergen Reactions     Corn Oil Anaphylaxis     When aerated      Food Hives     Raspberries, asparagus     Dye [Contrast Dye]      Iodine      Other reaction(s): Other, see comments  Stops breathing.        Social History     Tobacco Use     Smoking status: Former     Packs/day: 0.00     Years: 0.00     Pack years: 0.00     Types: Cigarettes     Quit date: 1977     Years since quittin.4     Smokeless tobacco: Never   Substance Use Topics     Alcohol use: Yes     Comment: 5-7 drinks a week     Family History   Problem Relation Age of Onset     Cerebrovascular Disease Mother      Glaucoma Mother 80        old age      Diabetes Mother      Hypertension Father      Breast Cancer Sister      Anesthesia Reaction Brother      Macular Degeneration No family hx of      History   Drug Use No         Objective     LMP  (LMP Unknown)     Physical Exam    GENERAL APPEARANCE: healthy, alert and no distress     EYES: EOMI, PERRL     HENT: ear canals and TM's normal and nose and mouth without ulcers or lesions     NECK: no adenopathy, no asymmetry, masses, or scars and thyroid normal to palpation     RESP: lungs clear to auscultation - no rales, rhonchi or wheezes     CV: regular rates and rhythm, normal S1 S2, no S3 or S4 and no murmur, click or rub      ABDOMEN:  soft, nontender, no HSM or masses and bowel sounds normal     MS: extremities normal- no gross deformities noted, no evidence of inflammation in joints, FROM in all extremities.     SKIN: no suspicious lesions or rashes     NEURO: Normal strength and tone, sensory exam grossly normal, mentation intact and speech normal     PSYCH: mentation appears normal. and affect normal/bright     LYMPHATICS: No cervical adenopathy    Recent Labs   Lab Test 10/06/21  1327 04/06/21  1105    140   POTASSIUM 3.6 3.7   CR 0.63 0.74        Diagnostics:    UA RESULTS:  Recent Labs   Lab Test 12/20/22  0958 06/25/21  1033   COLOR Yellow Yellow   APPEARANCE Clear Slightly Cloudy   URINEGLC Negative Negative   URINEBILI Negative Negative   URINEKETONE Trace* Negative   SG 1.020 <=1.005   UBLD Negative Moderate*   URINEPH 6.0 5.0   PROTEIN Negative Trace*   UROBILINOGEN 0.2 0.2   NITRITE Negative Negative   LEUKEST Negative Large*   RBCU  --  10-25*   WBCU  --  >100*       Recent Results (from the past 24 hour(s))   **CBC with platelets FUTURE 2mo    Collection Time: 12/19/22  9:58 AM   Result Value Ref Range    WBC Count 5.3 4.0 - 11.0 10e3/uL    RBC Count 3.94 3.80 - 5.20 10e6/uL    Hemoglobin 13.1 11.7 - 15.7 g/dL    Hematocrit 38.6 35.0 - 47.0 %    MCV 98 78 - 100 fL    MCH 33.2 (H) 26.5 - 33.0 pg    MCHC 33.9 31.5 - 36.5 g/dL    RDW 12.8 10.0 - 15.0 %    Platelet Count 246 150 - 450 10e3/uL   Lipid panel reflex to direct LDL Non-fasting    Collection Time: 12/19/22  9:58 AM   Result Value Ref Range    Cholesterol 185 <200 mg/dL    Triglycerides 84 <150 mg/dL    Direct Measure HDL 56 >=50 mg/dL    LDL Cholesterol Calculated 112 (H) <=100 mg/dL    Non HDL Cholesterol 129 <130 mg/dL    Patient Fasting > 8hrs? Yes    **Basic metabolic panel FUTURE 2mo    Collection Time: 12/19/22  9:58 AM   Result Value Ref Range    Sodium 141 133 - 144 mmol/L    Potassium 3.6 3.4 - 5.3 mmol/L    Chloride 107 94 - 109 mmol/L    Carbon  Dioxide (CO2) 29 20 - 32 mmol/L    Anion Gap 5 3 - 14 mmol/L    Urea Nitrogen 15 7 - 30 mg/dL    Creatinine 0.71 0.52 - 1.04 mg/dL    Calcium 9.4 8.5 - 10.1 mg/dL    Glucose 77 70 - 99 mg/dL    GFR Estimate >90 >60 mL/min/1.73m2      EKG: appears normal, NSR, sinus bradycardia, first degree av block, normal axis, normal intervals, no acute ST/T changes c/w ischemia, no LVH by voltage criteria, unchanged from previous tracings    Revised Cardiac Risk Index (RCRI):  The patient has the following serious cardiovascular risks for perioperative complications:   - No serious cardiac risks = 0 points     RCRI Interpretation: 0 points: Class I (very low risk - 0.4% complication rate)           Signed Electronically by: Shanel Aguiar MD  Copy of this evaluation report is provided to requesting physician.

## 2022-12-15 NOTE — PATIENT INSTRUCTIONS
For informational purposes only. Not to replace the advice of your health care provider. Copyright   2003,  [email or web address] [facility]. All rights reserved. Clinically reviewed by Bárbara Shook MD. [employer] [id number] - REV .  Preparing for Your Surgery  Getting started  A nurse will call you to review your health history and instructions. They will give you an arrival time based on your scheduled surgery time. Please be ready to share:    Your doctor's clinic name and phone number    Your medical, surgical, and anesthesia history    A list of allergies and sensitivities    A list of medicines, including herbal treatments and over-the-counter drugs    Whether the patient has a legal guardian (ask how to send us the papers in advance)  Please tell us if you're pregnant--or if there's any chance you might be pregnant. Some surgeries may injure a fetus (unborn baby), so they require a pregnancy test. Surgeries that are safe for a fetus don't always need a test, and you can choose whether to have one.   If you have a child who's having surgery, please ask for a copy of Preparing for Your Child's Surgery.    Preparing for surgery    Within 10 to 30 days of surgery: Have a pre-op exam (sometimes called an H&P, or History and Physical). This can be done at a clinic or pre-operative center.  ? If you're having a , you may not need this exam. Talk to your care team.    At your pre-op exam, talk to your care team about all medicines you take. If you need to stop any medicines before surgery, ask when to start taking them again.  ? We do this for your safety. Many medicines can make you bleed too much during surgery. Some change how well surgery (anesthesia) drugs work.    Call your insurance company to let them know you're having surgery. (If you don't have insurance, call [phone].)    Call your clinic if there's any change in your health. This includes signs of a cold or flu (sore throat, runny nose,  cough, rash, fever). It also includes a scrape or scratch near the surgery site.    If you have questions on the day of surgery, call your hospital or surgery center.  Eating and drinking guidelines  For your safety: Unless your surgeon tells you otherwise, follow the guidelines below.    Eat and drink as usual until 8 hours before you arrive for surgery. After that, no food or milk.    Drink clear liquids until 2 hours before you arrive. These are liquids you can see through, like water, Gatorade, and Propel Water. They also include plain black coffee and tea (no cream or milk), candy, and breath mints. You can spit out gum when you arrive.    If you drink alcohol: Stop drinking it the night before surgery.    If your care team tells you to take medicine on the morning of surgery, it's okay to take it with a sip of water.  Preventing infection    Shower or bathe the night before and morning of your surgery. Follow the instructions your clinic gave you. (If no instructions, use regular soap.)    Don't shave or clip hair near your surgery site. We'll remove the hair if needed.    Don't smoke or vape the morning of surgery. You may chew nicotine gum up to 2 hours before surgery. A nicotine patch is okay.  ? Note: Some surgeries require you to completely quit smoking and nicotine. Check with your surgeon.    Your care team will make every effort to keep you safe from infection. We will:  ? Clean our hands often with soap and water (or an alcohol-based hand rub).  ? Clean the skin at your surgery site with a special soap that kills germs.  ? Give you a special gown to keep you warm. (Cold raises the risk of infection.)  ? Wear special hair covers, masks, gowns and gloves during surgery.  ? Give antibiotic medicine, if prescribed. Not all surgeries need antibiotics.  What to bring on the day of surgery    Photo ID and insurance card    Copy of your health care directive, if you have one    Glasses and hearing aids (bring  cases)  ? You can't wear contacts during surgery    Inhaler and eye drops, if you use them (tell us about these when you arrive)    CPAP machine or breathing device, if you use them    A few personal items, if spending the night    If you have . . .  ? A pacemaker, ICD (cardiac defibrillator) or other implant: Bring the ID card.  ? An implanted stimulator: Bring the remote control.  ? A legal guardian: Bring a copy of the certified (court-stamped) guardianship papers.  Please remove any jewelry, including body piercings. Leave jewelry and other valuables at home.  If you're going home the day of surgery    You must have a responsible adult drive you home. They should stay with you overnight as well.    If you don't have someone to stay with you, and you aren't safe to go home alone, we may keep you overnight. Insurance often won't pay for this.  After surgery  If it's hard to control your pain or you need more pain medicine, please call your surgeon's office.  Questions?   If you have any questions for your care team, list them here: _________________________________________________________________________________________________________________________________________________________________________ ____________________________________ ____________________________________ ____________________________________

## 2022-12-15 NOTE — PATIENT INSTRUCTIONS
Patient Education   Personalized Prevention Plan  You are due for the preventive services outlined below.  Your care team is available to assist you in scheduling these services.  If you have already completed any of these items, please share that information with your care team to update in your medical record.  Health Maintenance Due   Topic Date Due     ANNUAL REVIEW OF HM ORDERS  Never done     Zoster (Shingles) Vaccine (1 of 2) Never done     COVID-19 Vaccine (5 - Booster for Pfizer series) 06/13/2022     Basic Metabolic Panel  10/06/2022     Annual Wellness Visit  12/16/2022

## 2022-12-19 ENCOUNTER — OFFICE VISIT (OUTPATIENT)
Dept: FAMILY MEDICINE | Facility: CLINIC | Age: 72
End: 2022-12-19
Payer: COMMERCIAL

## 2022-12-19 VITALS
BODY MASS INDEX: 22.96 KG/M2 | HEIGHT: 69 IN | HEART RATE: 58 BPM | TEMPERATURE: 98.7 F | WEIGHT: 155 LBS | RESPIRATION RATE: 16 BRPM | SYSTOLIC BLOOD PRESSURE: 132 MMHG | OXYGEN SATURATION: 98 % | DIASTOLIC BLOOD PRESSURE: 76 MMHG

## 2022-12-19 DIAGNOSIS — A60.00 RECURRENT GENITAL HERPES: ICD-10-CM

## 2022-12-19 DIAGNOSIS — Z12.31 ENCOUNTER FOR SCREENING MAMMOGRAM FOR BREAST CANCER: ICD-10-CM

## 2022-12-19 DIAGNOSIS — Z00.00 ENCOUNTER FOR MEDICARE ANNUAL WELLNESS EXAM: Primary | ICD-10-CM

## 2022-12-19 DIAGNOSIS — I10 HYPERTENSION GOAL BP (BLOOD PRESSURE) < 140/90: ICD-10-CM

## 2022-12-19 LAB
ANION GAP SERPL CALCULATED.3IONS-SCNC: 5 MMOL/L (ref 3–14)
BUN SERPL-MCNC: 15 MG/DL (ref 7–30)
CALCIUM SERPL-MCNC: 9.4 MG/DL (ref 8.5–10.1)
CHLORIDE BLD-SCNC: 107 MMOL/L (ref 94–109)
CHOLEST SERPL-MCNC: 185 MG/DL
CO2 SERPL-SCNC: 29 MMOL/L (ref 20–32)
CREAT SERPL-MCNC: 0.71 MG/DL (ref 0.52–1.04)
ERYTHROCYTE [DISTWIDTH] IN BLOOD BY AUTOMATED COUNT: 12.8 % (ref 10–15)
FASTING STATUS PATIENT QL REPORTED: YES
GFR SERPL CREATININE-BSD FRML MDRD: >90 ML/MIN/1.73M2
GLUCOSE BLD-MCNC: 77 MG/DL (ref 70–99)
HCT VFR BLD AUTO: 38.6 % (ref 35–47)
HDLC SERPL-MCNC: 56 MG/DL
HGB BLD-MCNC: 13.1 G/DL (ref 11.7–15.7)
LDLC SERPL CALC-MCNC: 112 MG/DL
MCH RBC QN AUTO: 33.2 PG (ref 26.5–33)
MCHC RBC AUTO-ENTMCNC: 33.9 G/DL (ref 31.5–36.5)
MCV RBC AUTO: 98 FL (ref 78–100)
NONHDLC SERPL-MCNC: 129 MG/DL
PLATELET # BLD AUTO: 246 10E3/UL (ref 150–450)
POTASSIUM BLD-SCNC: 3.6 MMOL/L (ref 3.4–5.3)
RBC # BLD AUTO: 3.94 10E6/UL (ref 3.8–5.2)
SODIUM SERPL-SCNC: 141 MMOL/L (ref 133–144)
TRIGL SERPL-MCNC: 84 MG/DL
WBC # BLD AUTO: 5.3 10E3/UL (ref 4–11)

## 2022-12-19 PROCEDURE — 99213 OFFICE O/P EST LOW 20 MIN: CPT | Mod: 25 | Performed by: FAMILY MEDICINE

## 2022-12-19 PROCEDURE — 85027 COMPLETE CBC AUTOMATED: CPT | Performed by: FAMILY MEDICINE

## 2022-12-19 PROCEDURE — 93000 ELECTROCARDIOGRAM COMPLETE: CPT | Performed by: FAMILY MEDICINE

## 2022-12-19 PROCEDURE — 36415 COLL VENOUS BLD VENIPUNCTURE: CPT | Performed by: FAMILY MEDICINE

## 2022-12-19 PROCEDURE — G0438 PPPS, INITIAL VISIT: HCPCS | Performed by: FAMILY MEDICINE

## 2022-12-19 PROCEDURE — 80061 LIPID PANEL: CPT | Performed by: FAMILY MEDICINE

## 2022-12-19 PROCEDURE — 80048 BASIC METABOLIC PNL TOTAL CA: CPT | Performed by: FAMILY MEDICINE

## 2022-12-19 RX ORDER — ACYCLOVIR 400 MG/1
TABLET ORAL
Qty: 180 TABLET | Refills: 3 | Status: SHIPPED | OUTPATIENT
Start: 2022-12-19 | End: 2024-01-15

## 2022-12-19 RX ORDER — AMLODIPINE BESYLATE 5 MG/1
5 TABLET ORAL
Qty: 180 TABLET | Refills: 3 | Status: SHIPPED | OUTPATIENT
Start: 2022-12-19 | End: 2024-01-13

## 2022-12-19 RX ORDER — LOSARTAN POTASSIUM 50 MG/1
50 TABLET ORAL 2 TIMES DAILY
Qty: 180 TABLET | Refills: 3 | Status: SHIPPED | OUTPATIENT
Start: 2022-12-19 | End: 2024-01-13

## 2022-12-19 ASSESSMENT — ACTIVITIES OF DAILY LIVING (ADL): CURRENT_FUNCTION: NO ASSISTANCE NEEDED

## 2022-12-19 ASSESSMENT — ENCOUNTER SYMPTOMS
ABDOMINAL PAIN: 0
BREAST MASS: 0
FREQUENCY: 0
HEMATOCHEZIA: 0
MYALGIAS: 0
CONSTIPATION: 0
DIZZINESS: 0
HEARTBURN: 0
JOINT SWELLING: 0
FEVER: 0
HEADACHES: 0
SHORTNESS OF BREATH: 0
PARESTHESIAS: 0
PALPITATIONS: 0
DYSURIA: 0
WEAKNESS: 0
NAUSEA: 0
HEMATURIA: 0
ARTHRALGIAS: 1
SORE THROAT: 0
CHILLS: 0
EYE PAIN: 0
NERVOUS/ANXIOUS: 0
COUGH: 0

## 2022-12-19 ASSESSMENT — PAIN SCALES - GENERAL: PAINLEVEL: NO PAIN (0)

## 2022-12-20 ENCOUNTER — OFFICE VISIT (OUTPATIENT)
Dept: FAMILY MEDICINE | Facility: CLINIC | Age: 72
End: 2022-12-20
Payer: COMMERCIAL

## 2022-12-20 VITALS
WEIGHT: 155 LBS | HEART RATE: 65 BPM | HEIGHT: 69 IN | OXYGEN SATURATION: 98 % | TEMPERATURE: 98.6 F | SYSTOLIC BLOOD PRESSURE: 138 MMHG | DIASTOLIC BLOOD PRESSURE: 78 MMHG | BODY MASS INDEX: 22.96 KG/M2 | RESPIRATION RATE: 17 BRPM

## 2022-12-20 DIAGNOSIS — M17.11 PRIMARY OSTEOARTHRITIS OF RIGHT KNEE: ICD-10-CM

## 2022-12-20 DIAGNOSIS — I10 HYPERTENSION GOAL BP (BLOOD PRESSURE) < 140/90: ICD-10-CM

## 2022-12-20 DIAGNOSIS — Z01.818 PREOP GENERAL PHYSICAL EXAM: Primary | ICD-10-CM

## 2022-12-20 LAB
ALBUMIN UR-MCNC: NEGATIVE MG/DL
APPEARANCE UR: CLEAR
BILIRUB UR QL STRIP: NEGATIVE
COLOR UR AUTO: YELLOW
GLUCOSE UR STRIP-MCNC: NEGATIVE MG/DL
HGB UR QL STRIP: NEGATIVE
KETONES UR STRIP-MCNC: ABNORMAL MG/DL
LEUKOCYTE ESTERASE UR QL STRIP: NEGATIVE
NITRATE UR QL: NEGATIVE
PH UR STRIP: 6 [PH] (ref 5–7)
SP GR UR STRIP: 1.02 (ref 1–1.03)
UROBILINOGEN UR STRIP-ACNC: 0.2 E.U./DL

## 2022-12-20 PROCEDURE — 81003 URINALYSIS AUTO W/O SCOPE: CPT | Performed by: FAMILY MEDICINE

## 2022-12-20 PROCEDURE — 99214 OFFICE O/P EST MOD 30 MIN: CPT | Performed by: FAMILY MEDICINE

## 2022-12-20 ASSESSMENT — PAIN SCALES - GENERAL: PAINLEVEL: NO PAIN (0)

## 2023-01-06 ENCOUNTER — TELEPHONE (OUTPATIENT)
Dept: OTOLARYNGOLOGY | Facility: CLINIC | Age: 73
End: 2023-01-06

## 2023-01-12 ENCOUNTER — TRANSFERRED RECORDS (OUTPATIENT)
Dept: HEALTH INFORMATION MANAGEMENT | Facility: CLINIC | Age: 73
End: 2023-01-12

## 2023-01-24 ENCOUNTER — E-VISIT (OUTPATIENT)
Dept: FAMILY MEDICINE | Facility: CLINIC | Age: 73
End: 2023-01-24
Payer: COMMERCIAL

## 2023-01-24 ENCOUNTER — TELEPHONE (OUTPATIENT)
Dept: FAMILY MEDICINE | Facility: CLINIC | Age: 73
End: 2023-01-24

## 2023-01-24 DIAGNOSIS — I10 HYPERTENSION GOAL BP (BLOOD PRESSURE) < 140/90: Primary | ICD-10-CM

## 2023-01-24 PROCEDURE — 99422 OL DIG E/M SVC 11-20 MIN: CPT | Performed by: FAMILY MEDICINE

## 2023-01-24 NOTE — TELEPHONE ENCOUNTER
"Routed to provider for advise.     Patient contacted the clinic due to high blood pressures for the past 3 days. She also explain that she has had intermediate HA. She reports that the blood pres sues have been 150s-170s.     Patient recently had knee surgery and explains that her pain is \"very minimal.\"     Patient is requesting a change or increase in medication.    Penny APONTE RN, BSN   [facility]     "

## 2023-01-24 NOTE — TELEPHONE ENCOUNTER
"Called and spoke with patient. Relayed provider message and recommendation, as noted below.  Patient agreed with plan. Will go in [employer] account and complete an E-visit some time today.    Patient noted her BP has suddenly increased in the last 3 days, this morning was highest reading has had as of yet. Has been having headaches but denied shortness of breath, chest pain, weakness, numbness or tingling of extremities, lightheadedness.    Patient wanted to remind provider that she has only been taking half of her noted doses of the Losartan and Amlodipine for about the past 6-7 months.  Medication list notes Amlodipine 5 mg BID and Losartan 50 mg BID.  Has only been taking each medication once daily, in the morning. Patient took an evening Losartan tablet yesterday, however, since BP was high.  Patient stated that this was discussed with provider and change was approved by PCP. Patient noted, \"we've talked about this, she should be aware\".    Patient to submit E-visit some time today, is out a breakfast right now and has another appointment around noon today. Plans to try to complete in-between breakfast and her appointment.    Routing to provider as update only.      Aimee Pollock RN  [employer]      "

## 2023-01-25 ENCOUNTER — E-VISIT (OUTPATIENT)
Dept: FAMILY MEDICINE | Facility: CLINIC | Age: 73
End: 2023-01-25
Payer: COMMERCIAL

## 2023-01-25 DIAGNOSIS — I10 HYPERTENSION GOAL BP (BLOOD PRESSURE) < 140/90: Primary | ICD-10-CM

## 2023-01-25 PROCEDURE — 99421 OL DIG E/M SVC 5-10 MIN: CPT | Performed by: FAMILY MEDICINE

## 2023-02-09 ENCOUNTER — TRANSFERRED RECORDS (OUTPATIENT)
Dept: MULTI SPECIALTY CLINIC | Facility: CLINIC | Age: 73
End: 2023-02-09

## 2023-02-16 ENCOUNTER — TRANSFERRED RECORDS (OUTPATIENT)
Dept: HEALTH INFORMATION MANAGEMENT | Facility: CLINIC | Age: 73
End: 2023-02-16

## 2023-06-06 ENCOUNTER — OFFICE VISIT (OUTPATIENT)
Dept: FAMILY MEDICINE | Facility: CLINIC | Age: 73
End: 2023-06-06
Payer: COMMERCIAL

## 2023-06-06 VITALS
SYSTOLIC BLOOD PRESSURE: 155 MMHG | DIASTOLIC BLOOD PRESSURE: 76 MMHG | WEIGHT: 155 LBS | BODY MASS INDEX: 22.96 KG/M2 | HEART RATE: 64 BPM | OXYGEN SATURATION: 97 % | HEIGHT: 69 IN | RESPIRATION RATE: 17 BRPM | TEMPERATURE: 98.8 F

## 2023-06-06 DIAGNOSIS — L82.0 INFLAMED SEBORRHEIC KERATOSIS: Primary | ICD-10-CM

## 2023-06-06 PROCEDURE — 17110 DESTRUCTION B9 LES UP TO 14: CPT | Performed by: FAMILY MEDICINE

## 2023-06-06 ASSESSMENT — PAIN SCALES - GENERAL: PAINLEVEL: NO PAIN (0)

## 2023-06-06 NOTE — PROGRESS NOTES
"  Assessment & Plan     (L82.0) Inflamed seborrheic keratosis  (primary encounter diagnosis)  Comment:   Plan: DESTRUCT BENIGN LESION, UP TO 14        Follow-up as  Needed for further lesions    I    Shanel Aguiar MD  [facility]    Tan Don is a 72 year old, presenting for the following health issues:  Lesion Removal        6/6/2023    10:54 AM   Additional Questions   Roomed by alexander     History of Present Illness       Reason for visit:  Mole removal    She eats 2-3 servings of fruits and vegetables daily.She consumes 1 sweetened beverage(s) daily.She exercises with enough effort to increase her heart rate 10 to 19 minutes per day.  She exercises with enough effort to increase her heart rate 3 or less days per week.   She is taking medications regularly.        Mole on back growing in size and itchy  It is on bra line and she would like it removed              Review of Systems   Constitutional, HEENT, cardiovascular, pulmonary, gi and gu systems are negative, except as otherwise noted.      Objective    BP (!) 155/76   Pulse 64   Temp 98.8  F (37.1  C) (Oral)   Resp 17   Ht 1.753 m (5' 9\")   Wt 70.3 kg (155 lb)   LMP  (LMP Unknown)   SpO2 97%   BMI 22.89 kg/m    Body mass index is 22.89 kg/m .  Physical Exam   GENERAL: healthy, alert and no distress  SKIN: dime sized hyperpigmented seb keratosis on mid back  Treated x 3 with liquid nitrogen. Pt tolerated procedure    No results found for this or any previous visit (from the past 24 hour(s)).                "

## 2023-06-13 ENCOUNTER — TELEPHONE (OUTPATIENT)
Dept: FAMILY MEDICINE | Facility: CLINIC | Age: 73
End: 2023-06-13

## 2023-06-13 NOTE — TELEPHONE ENCOUNTER
Patient Quality Outreach    Patient is due for the following:   Breast Cancer Screening - Mammogram    Next Steps:   Chart routed to abstraction.      Type of outreach:    Chart review performed, no outreach needed.      Questions for provider review:    None           Gloria Tinsley MA

## 2023-10-28 ENCOUNTER — TELEPHONE (OUTPATIENT)
Dept: FAMILY MEDICINE | Facility: CLINIC | Age: 73
End: 2023-10-28
Payer: COMMERCIAL

## 2023-10-28 NOTE — TELEPHONE ENCOUNTER
Medication Question or Refill    Contacts         Type Contact Phone/Fax    10/28/2023 08:46 AM CDT Phone (Incoming) Florencia Ross (Self) [phone] (H)            What medication are you calling about (include dose and sig)?: Doesn't remember    Preferred Pharmacy:   [id number] - [place], [place] - [address]  [address]  [facility] [address]  Phone: [phone] Fax: [phone]    [id number] [place] [facility] - [place], [place] - [address]  [address]  [facility] [address]  Phone: [phone] Fax: [phone]    [facility] - [place], [place] - [address]  [address]  [facility] [address]  Phone: [phone] Fax: [phone]    [facility] [place] [facility] - [place], [place] - [address]  [address]  [facility] [address]  Phone: [phone] Fax: [phone]      Controlled Substance Agreement on file:   CSA -- Patient Level:    CSA: None found at the patient level.       Who prescribed the medication?: PCP Dr Hartman    Do you need a refill? Yes    When did you use the medication last? Over a year ago    Patient offered an appointment? No    Do you have any questions or concerns?  No      Could we send this information to you in [facility] or would you prefer to receive a phone call?:   Patient would prefer a phone call   Okay to leave a detailed message?: Yes at Cell number on file:    Telephone Information:   Mobile [phone]

## 2023-11-07 ENCOUNTER — TELEPHONE (OUTPATIENT)
Dept: FAMILY MEDICINE | Facility: CLINIC | Age: 73
End: 2023-11-07
Payer: COMMERCIAL

## 2023-11-07 NOTE — TELEPHONE ENCOUNTER
Patient Quality Outreach    Patient is due for the following:   Hypertension -  BP check    Next Steps:   Schedule a nurse only visit for bp check    Type of outreach:    Sent [employer] message.      Questions for provider review:    None           Gloria Tinsley MA

## 2023-12-21 ENCOUNTER — TRANSFERRED RECORDS (OUTPATIENT)
Dept: HEALTH INFORMATION MANAGEMENT | Facility: CLINIC | Age: 73
End: 2023-12-21
Payer: COMMERCIAL

## 2024-01-14 ASSESSMENT — ENCOUNTER SYMPTOMS
BREAST MASS: 0
DIZZINESS: 0
PARESTHESIAS: 0
NAUSEA: 0
ABDOMINAL PAIN: 0
WEAKNESS: 0
CONSTIPATION: 1
SHORTNESS OF BREATH: 0
SORE THROAT: 0
JOINT SWELLING: 0
ARTHRALGIAS: 0
CHILLS: 0
HEMATOCHEZIA: 0
COUGH: 0
HEARTBURN: 0
EYE PAIN: 0
PALPITATIONS: 0
HEADACHES: 0
FEVER: 0
DIARRHEA: 0
DYSURIA: 0
MYALGIAS: 0
FREQUENCY: 0
HEMATURIA: 0
NERVOUS/ANXIOUS: 0

## 2024-01-14 ASSESSMENT — ACTIVITIES OF DAILY LIVING (ADL): CURRENT_FUNCTION: NO ASSISTANCE NEEDED

## 2024-01-15 ENCOUNTER — OFFICE VISIT (OUTPATIENT)
Dept: FAMILY MEDICINE | Facility: CLINIC | Age: 74
End: 2024-01-15
Payer: COMMERCIAL

## 2024-01-15 VITALS
WEIGHT: 159 LBS | SYSTOLIC BLOOD PRESSURE: 139 MMHG | RESPIRATION RATE: 14 BRPM | OXYGEN SATURATION: 98 % | TEMPERATURE: 97.9 F | HEART RATE: 66 BPM | HEIGHT: 69 IN | BODY MASS INDEX: 23.55 KG/M2 | DIASTOLIC BLOOD PRESSURE: 77 MMHG

## 2024-01-15 DIAGNOSIS — Z86.0100 HISTORY OF COLONIC POLYPS: ICD-10-CM

## 2024-01-15 DIAGNOSIS — A60.00 RECURRENT GENITAL HERPES: ICD-10-CM

## 2024-01-15 DIAGNOSIS — Z00.00 ENCOUNTER FOR MEDICARE ANNUAL WELLNESS EXAM: Primary | ICD-10-CM

## 2024-01-15 DIAGNOSIS — Z12.31 ENCOUNTER FOR SCREENING MAMMOGRAM FOR BREAST CANCER: ICD-10-CM

## 2024-01-15 DIAGNOSIS — I10 HYPERTENSION GOAL BP (BLOOD PRESSURE) < 140/90: ICD-10-CM

## 2024-01-15 LAB
ERYTHROCYTE [DISTWIDTH] IN BLOOD BY AUTOMATED COUNT: 12 % (ref 10–15)
HCT VFR BLD AUTO: 37.6 % (ref 35–47)
HGB BLD-MCNC: 12.5 G/DL (ref 11.7–15.7)
MCH RBC QN AUTO: 32.2 PG (ref 26.5–33)
MCHC RBC AUTO-ENTMCNC: 33.2 G/DL (ref 31.5–36.5)
MCV RBC AUTO: 97 FL (ref 78–100)
PLATELET # BLD AUTO: 202 10E3/UL (ref 150–450)
RBC # BLD AUTO: 3.88 10E6/UL (ref 3.8–5.2)
WBC # BLD AUTO: 4.9 10E3/UL (ref 4–11)

## 2024-01-15 PROCEDURE — 80061 LIPID PANEL: CPT | Performed by: FAMILY MEDICINE

## 2024-01-15 PROCEDURE — G0439 PPPS, SUBSEQ VISIT: HCPCS | Performed by: FAMILY MEDICINE

## 2024-01-15 PROCEDURE — 85027 COMPLETE CBC AUTOMATED: CPT | Performed by: FAMILY MEDICINE

## 2024-01-15 PROCEDURE — 90662 IIV NO PRSV INCREASED AG IM: CPT | Performed by: FAMILY MEDICINE

## 2024-01-15 PROCEDURE — G0008 ADMIN INFLUENZA VIRUS VAC: HCPCS | Performed by: FAMILY MEDICINE

## 2024-01-15 PROCEDURE — 36415 COLL VENOUS BLD VENIPUNCTURE: CPT | Performed by: FAMILY MEDICINE

## 2024-01-15 PROCEDURE — 80048 BASIC METABOLIC PNL TOTAL CA: CPT | Performed by: FAMILY MEDICINE

## 2024-01-15 PROCEDURE — 99214 OFFICE O/P EST MOD 30 MIN: CPT | Mod: 25 | Performed by: FAMILY MEDICINE

## 2024-01-15 RX ORDER — RESPIRATORY SYNCYTIAL VIRUS VACCINE 120MCG/0.5
0.5 KIT INTRAMUSCULAR ONCE
Qty: 1 EACH | Refills: 0 | Status: CANCELLED | OUTPATIENT
Start: 2024-01-15 | End: 2024-01-15

## 2024-01-15 RX ORDER — ACYCLOVIR 400 MG/1
TABLET ORAL
Qty: 180 TABLET | Refills: 3 | Status: SHIPPED | OUTPATIENT
Start: 2024-01-15

## 2024-01-15 RX ORDER — AMLODIPINE BESYLATE 5 MG/1
5 TABLET ORAL
Qty: 180 TABLET | Refills: 3 | Status: SHIPPED | OUTPATIENT
Start: 2024-01-15

## 2024-01-15 RX ORDER — LOSARTAN POTASSIUM 50 MG/1
50 TABLET ORAL 2 TIMES DAILY
Qty: 180 TABLET | Refills: 3 | Status: SHIPPED | OUTPATIENT
Start: 2024-01-15

## 2024-01-15 ASSESSMENT — ENCOUNTER SYMPTOMS
WEAKNESS: 0
HEADACHES: 0
SHORTNESS OF BREATH: 0
NERVOUS/ANXIOUS: 0
CHILLS: 0
NAUSEA: 0
EYE PAIN: 0
ABDOMINAL PAIN: 0
COUGH: 0
MYALGIAS: 0
JOINT SWELLING: 0
HEMATURIA: 0
ARTHRALGIAS: 0
HEARTBURN: 0
FREQUENCY: 0
FEVER: 0
PALPITATIONS: 0
DIARRHEA: 0
DYSURIA: 0
SORE THROAT: 0
PARESTHESIAS: 0
BREAST MASS: 0
DIZZINESS: 0
CONSTIPATION: 1
HEMATOCHEZIA: 0

## 2024-01-15 ASSESSMENT — ACTIVITIES OF DAILY LIVING (ADL): CURRENT_FUNCTION: NO ASSISTANCE NEEDED

## 2024-01-15 ASSESSMENT — PAIN SCALES - GENERAL: PAINLEVEL: NO PAIN (0)

## 2024-01-15 NOTE — PROGRESS NOTES
"SUBJECTIVE:   Florencia is a 73 year old, presenting for the following:  Physical        1/15/2024     1:09 PM   Additional Questions   Roomed by Garrett Tucker MA   Accompanied by Self       Are you in the first 12 months of your Medicare coverage?  No    Healthy Habits:     In general, how would you rate your overall health?  Good    Frequency of exercise:  2-3 days/week    Duration of exercise:  15-30 minutes    Do you usually eat at least 4 servings of fruit and vegetables a day, include whole grains    & fiber and avoid regularly eating high fat or \"junk\" foods?  No    Taking medications regularly:  Yes    Medication side effects:  Other    Ability to successfully perform activities of daily living:  No assistance needed    Home Safety:  No safety concerns identified    Hearing Impairment:  No hearing concerns    In the past 6 months, have you been bothered by leaking of urine?  No    In general, how would you rate your overall mental or emotional health?  Excellent    Additional concerns today:  Yes      Htn well controlled on meds  Had a few more kidney stones.    Has not had a stone analyzed yet    Today's PHQ-2 Score:       1/14/2024     4:42 PM   PHQ-2 ( 1999 [employer])   Q1: Little interest or pleasure in doing things 0   Q2: Feeling down, depressed or hopeless 0   PHQ-2 Score 0   Q1: Little interest or pleasure in doing things Not at all   Q2: Feeling down, depressed or hopeless Not at all   PHQ-2 Score 0           Have you ever done Advance Care Planning? (For example, a Health Directive, POLST, or a discussion with a medical provider or your loved ones about your wishes): No, advance care planning information given to patient to review.  Patient declined advance care planning discussion at this time.    Fall risk  Fallen 2 or more times in the past year?: Yes  Any fall with injury in the past year?: No    Cognitive Screening No concerns based on direct observation      Do you have sleep apnea, excessive snoring or " daytime drowsiness? : no    Reviewed and updated as needed this visit by clinical staff   Tobacco  Allergies  Meds              Reviewed and updated as needed this visit by Provider                 Social History     Tobacco Use    Smoking status: Former     Packs/day: 0.00     Years: 0.00     Additional pack years: 0.00     Total pack years: 0.00     Types: Cigarettes     Quit date: 1977     Years since quittin.5    Smokeless tobacco: Never   Substance Use Topics    Alcohol use: Yes     Comment: 5-7 drinks a week             2024     4:42 PM   Alcohol Use   Prescreen: >3 drinks/day or >7 drinks/week? No          No data to display              Do you have a current opioid prescription? No  Do you use any other controlled substances or medications that are not prescribed by a provider? None          Current providers sharing in care for this patient include  Patient Care Team:  Shanel Aguiar MD as PCP - General (Family Practice)  Nissen, Rick L, MD as MD (Otolaryngology)  Tonie Gomez AuD as Audiologist (Audiology)  Shanel Aguiar MD as Assigned PCP  Brenda Apple MD as MD (Ophthalmology)    The following health maintenance items are reviewed in Epic and correct as of today:  Health Maintenance   Topic Date Due    ANNUAL REVIEW OF HM ORDERS  Never done    ZOSTER IMMUNIZATION (1 of 2) Never done    RSV VACCINE (Pregnancy & 60+) (1 - 1-dose 60+ series) Never done    INFLUENZA VACCINE (1) 2023    COVID-19 Vaccine ( - -24 season) 2023    BMP  2023    LIPID  2023    MEDICARE ANNUAL WELLNESS VISIT  2023    COLORECTAL CANCER SCREENING  2024    FALL RISK ASSESSMENT  01/15/2025    MAMMO SCREENING  2025    DTAP/TDAP/TD IMMUNIZATION (2 - Td or Tdap) 2026    ADVANCE CARE PLANNING  01/15/2029    DEXA  2034    HEPATITIS C SCREENING  Completed    PHQ-2 (once per calendar year)  Completed    Pneumococcal Vaccine: 65+ Years   "Addressed    IPV IMMUNIZATION  Aged Out    HPV IMMUNIZATION  Aged Out    MENINGITIS IMMUNIZATION  Aged Out    RSV MONOCLONAL ANTIBODY  Aged Out     Lab work is in process  Mammogram Screening: Mammogram Screening: Recommended mammography every 1-2 years with patient discussion and risk factor consideration        Review of Systems   Constitutional:  Negative for chills and fever.   HENT:  Positive for hearing loss. Negative for congestion, ear pain and sore throat.    Eyes:  Negative for pain and visual disturbance.   Respiratory:  Negative for cough and shortness of breath.    Cardiovascular:  Negative for chest pain, palpitations and peripheral edema.   Gastrointestinal:  Positive for constipation. Negative for abdominal pain, diarrhea, heartburn, hematochezia and nausea.   Breasts:  Negative for tenderness, breast mass and discharge.   Genitourinary:  Negative for dysuria, frequency, genital sores, hematuria, pelvic pain, urgency, vaginal bleeding and vaginal discharge.   Musculoskeletal:  Negative for arthralgias, joint swelling and myalgias.   Skin:  Negative for rash.   Neurological:  Negative for dizziness, weakness, headaches and paresthesias.   Psychiatric/Behavioral:  Negative for mood changes. The patient is not nervous/anxious.      Constitutional, HEENT, cardiovascular, pulmonary, gi and gu systems are negative, except as otherwise noted.    OBJECTIVE:   /77   Pulse 66   Temp 97.9  F (36.6  C) (Tympanic)   Resp 14   Ht 1.753 m (5' 9\")   Wt 72.1 kg (159 lb)   LMP  (LMP Unknown)   SpO2 98%   Breastfeeding No   BMI 23.48 kg/m   Estimated body mass index is 23.48 kg/m  as calculated from the following:    Height as of this encounter: 1.753 m (5' 9\").    Weight as of this encounter: 72.1 kg (159 lb).  Physical Exam  GENERAL: healthy, alert and no distress  EYES: Eyes grossly normal to inspection, PERRL and conjunctivae and sclerae normal  HENT: ear canals and TM's normal, nose and mouth " without ulcers or lesions  NECK: no adenopathy, no asymmetry, masses, or scars and thyroid normal to palpation  RESP: lungs clear to auscultation - no rales, rhonchi or wheezes  BREAST: normal without masses, tenderness or nipple discharge and no palpable axillary masses or adenopathy  CV: regular rate and rhythm, normal S1 S2, no S3 or S4, no murmur, click or rub, no peripheral edema and peripheral pulses strong  ABDOMEN: soft, nontender, no hepatosplenomegaly, no masses and bowel sounds normal  MS: no gross musculoskeletal defects noted, no edema  SKIN: no suspicious lesions or rashes  NEURO: Normal strength and tone, mentation intact and speech normal  PSYCH: mentation appears normal, affect normal/bright    Diagnostic Test Results:  Labs reviewed in Epic  No results found for this or any previous visit (from the past 24 hour(s)).    ASSESSMENT / PLAN:   (Z00.00) Encounter for Medicare annual wellness exam  (primary encounter diagnosis)  Comment:   Plan:     (I10) Hypertension goal BP (blood pressure) < 140/90  Comment: at goal on med  Plan: amLODIPine (NORVASC) 5 MG tablet, losartan         (COZAAR) 50 MG tablet, **Basic metabolic panel         FUTURE 2mo, Lipid panel reflex to direct LDL         Non-fasting, **CBC with platelets FUTURE 2mo            (A60.00) Recurrent genital herpes  Comment: refill  Plan: acyclovir (ZOVIRAX) 400 MG tablet            (Z86.010) History of colonic polyps  Comment: due  Plan: Colonoscopy Screening  Referral            (Z12.31) Encounter for screening mammogram for breast cancer  Comment: due given family h/o breast cancer  Plan: *MA Screening Digital Bilateral            Patient has been advised of split billing requirements and indicates understanding: Yes      COUNSELING:  Reviewed preventive health counseling, as reflected in patient instructions       Regular exercise       Healthy diet/nutrition       Vision screening       Dental care       Colon cancer  screening        She reports that she quit smoking about 46 years ago. Her smoking use included cigarettes. She has never used smokeless tobacco.      Appropriate preventive services were discussed with this patient, including applicable screening as appropriate for fall prevention, nutrition, physical activity, Tobacco-use cessation, weight loss and cognition.  Checklist reviewing preventive services available has been given to the patient.    Reviewed patients plan of care and provided an AVS. The Intermediate Care Plan ( asthma action plan, low back pain action plan, and migraine action plan) for Florencia meets the Care Plan requirement. This Care Plan has been established and reviewed with the Patient.          Shanel Aguiar MD  [facility]    Identified Health Risks:  I have reviewed Opioid Use Disorder and Substance Use Disorder risk factors and made any needed referrals.

## 2024-01-15 NOTE — PATIENT INSTRUCTIONS
Patient Education   Personalized Prevention Plan  You are due for the preventive services outlined below.  Your care team is available to assist you in scheduling these services.  If you have already completed any of these items, please share that information with your care team to update in your medical record.  Health Maintenance Due   Topic Date Due     ANNUAL REVIEW OF HM ORDERS  Never done     Zoster (Shingles) Vaccine (1 of 2) Never done     RSV VACCINE (Pregnancy & 60+) (1 - 1-dose 60+ series) Never done     Flu Vaccine (1) 09/01/2023     COVID-19 Vaccine (5 - 2023-24 season) 09/01/2023     Basic Metabolic Panel  12/19/2023     Cholesterol Lab  12/19/2023     Annual Wellness Visit  12/19/2023

## 2024-01-16 LAB
ANION GAP SERPL CALCULATED.3IONS-SCNC: 13 MMOL/L (ref 7–15)
BUN SERPL-MCNC: 13.5 MG/DL (ref 8–23)
CALCIUM SERPL-MCNC: 9.4 MG/DL (ref 8.8–10.2)
CHLORIDE SERPL-SCNC: 104 MMOL/L (ref 98–107)
CHOLEST SERPL-MCNC: 196 MG/DL
CREAT SERPL-MCNC: 0.66 MG/DL (ref 0.51–0.95)
DEPRECATED HCO3 PLAS-SCNC: 25 MMOL/L (ref 22–29)
EGFRCR SERPLBLD CKD-EPI 2021: >90 ML/MIN/1.73M2
FASTING STATUS PATIENT QL REPORTED: NO
GLUCOSE SERPL-MCNC: 97 MG/DL (ref 70–99)
HDLC SERPL-MCNC: 56 MG/DL
LDLC SERPL CALC-MCNC: 123 MG/DL
NONHDLC SERPL-MCNC: 140 MG/DL
POTASSIUM SERPL-SCNC: 3.4 MMOL/L (ref 3.4–5.3)
SODIUM SERPL-SCNC: 142 MMOL/L (ref 135–145)
TRIGL SERPL-MCNC: 86 MG/DL

## 2024-01-17 ENCOUNTER — OFFICE VISIT (OUTPATIENT)
Dept: OPHTHALMOLOGY | Facility: CLINIC | Age: 74
End: 2024-01-17
Payer: COMMERCIAL

## 2024-01-17 DIAGNOSIS — H25.813 COMBINED FORMS OF AGE-RELATED CATARACT OF BOTH EYES: Primary | ICD-10-CM

## 2024-01-17 DIAGNOSIS — H52.4 HYPEROPIA OF BOTH EYES WITH REGULAR ASTIGMATISM AND PRESBYOPIA: ICD-10-CM

## 2024-01-17 DIAGNOSIS — H52.03 HYPEROPIA OF BOTH EYES WITH REGULAR ASTIGMATISM AND PRESBYOPIA: ICD-10-CM

## 2024-01-17 DIAGNOSIS — H02.834 DERMATOCHALASIS OF BOTH UPPER EYELIDS: ICD-10-CM

## 2024-01-17 DIAGNOSIS — H02.831 DERMATOCHALASIS OF BOTH UPPER EYELIDS: ICD-10-CM

## 2024-01-17 DIAGNOSIS — H52.223 HYPEROPIA OF BOTH EYES WITH REGULAR ASTIGMATISM AND PRESBYOPIA: ICD-10-CM

## 2024-01-17 PROCEDURE — 92014 COMPRE OPH EXAM EST PT 1/>: CPT | Performed by: STUDENT IN AN ORGANIZED HEALTH CARE EDUCATION/TRAINING PROGRAM

## 2024-01-17 PROCEDURE — 92015 DETERMINE REFRACTIVE STATE: CPT | Performed by: STUDENT IN AN ORGANIZED HEALTH CARE EDUCATION/TRAINING PROGRAM

## 2024-01-17 ASSESSMENT — REFRACTION_MANIFEST
OS_SPHERE: +2.50
OS_CYLINDER: +0.75
OS_AXIS: 180
OD_ADD: +2.50
OD_CYLINDER: +1.25
OD_AXIS: 003
OS_ADD: +2.50
OD_SPHERE: +2.50

## 2024-01-17 ASSESSMENT — VISUAL ACUITY
OS_CC: J1+2
OD_CC: 20/25
OS_CC: 20/25
OD_CC: J1+-1
CORRECTION_TYPE: GLASSES
OD_CC+: -2
METHOD: SNELLEN - LINEAR
OS_CC+: -1

## 2024-01-17 ASSESSMENT — TONOMETRY
OD_IOP_MMHG: 18
OS_IOP_MMHG: 18
IOP_METHOD: APPLANATION

## 2024-01-17 ASSESSMENT — CUP TO DISC RATIO
OD_RATIO: 0.6
OS_RATIO: 0.6

## 2024-01-17 ASSESSMENT — REFRACTION_WEARINGRX
OD_SPHERE: +2.00
OS_CYLINDER: +0.75
OS_ADD: +2.50
OD_CYLINDER: +1.00
OD_ADD: +2.50
OD_AXIS: 020
OS_SPHERE: +1.75
OS_AXIS: 175

## 2024-01-17 ASSESSMENT — CONF VISUAL FIELD
OD_INFERIOR_NASAL_RESTRICTION: 0
OS_SUPERIOR_TEMPORAL_RESTRICTION: 0
OD_NORMAL: 1
OS_NORMAL: 1
OS_SUPERIOR_NASAL_RESTRICTION: 0
OS_INFERIOR_TEMPORAL_RESTRICTION: 0
OD_SUPERIOR_NASAL_RESTRICTION: 0
OD_INFERIOR_TEMPORAL_RESTRICTION: 0
OS_INFERIOR_NASAL_RESTRICTION: 0
METHOD: COUNTING FINGERS
OD_SUPERIOR_TEMPORAL_RESTRICTION: 0

## 2024-01-17 ASSESSMENT — SLIT LAMP EXAM - LIDS: COMMENTS: 2+ DERMATOCHALASIS

## 2024-01-17 ASSESSMENT — EXTERNAL EXAM - RIGHT EYE: OD_EXAM: NORMAL

## 2024-01-17 ASSESSMENT — EXTERNAL EXAM - LEFT EYE: OS_EXAM: NORMAL

## 2024-01-17 NOTE — PROGRESS NOTES
Current Eye Medications:  None     Subjective:  Pt returns for a comprehensive eye exam. She thinks that her near vision has changed a little, and more difficulty in low-light situations. Generally she states her eyes are comfortable. She is not using any drops at all at present. Positive family history of glaucoma with mother.    Pt requesting weaker dilation drops today.     Objective:  See Ophthalmology Exam.       Assessment:  Florencia Ross is a 73 year old female who presents with:   Encounter Diagnoses   Name Primary?    Combined forms of age-related cataract of both eyes Yes    Dermatochalasis of both upper eyelids     Hyperopia of both eyes with regular astigmatism and presbyopia        Plan:  Glasses prescription given     Brenda Apple MD  [phone]

## 2024-01-17 NOTE — LETTER
1/17/2024         RE: Florencia Ross  [address] [address] [address]        Dear Colleague,    Thank you for referring your patient, Florencia Ross, to the [facility]. Please see a copy of my visit note below.     Current Eye Medications:  None     Subjective:  Pt returns for a comprehensive eye exam. She thinks that her near vision has changed a little, and more difficulty in low-light situations. Generally she states her eyes are comfortable. She is not using any drops at all at present. Positive family history of glaucoma with mother.    Pt requesting weaker dilation drops today.     Objective:  See Ophthalmology Exam.       Assessment:  Florencia Ross is a 73 year old female who presents with:   Encounter Diagnoses   Name Primary?     Combined forms of age-related cataract of both eyes Yes     Dermatochalasis of both upper eyelids      Hyperopia of both eyes with regular astigmatism and presbyopia        Plan:  Glasses prescription given     Brenda Apple MD  [phone]       Again, thank you for allowing me to participate in the care of your patient.        Sincerely,        Brenda Apple MD

## 2024-01-25 ENCOUNTER — TELEPHONE (OUTPATIENT)
Dept: GASTROENTEROLOGY | Facility: CLINIC | Age: 74
End: 2024-01-25
Payer: COMMERCIAL

## 2024-01-25 NOTE — TELEPHONE ENCOUNTER
"Endoscopy Scheduling Screen    Have you had a positive Covid test in the last 14 days?  No    Are you active on MyChart?   Yes    What insurance is in the chart?  Other:  [employer]    Ordering/Referring Provider:   GUANAKITO PERAZA        (If ordering provider performs procedure, schedule with ordering provider unless otherwise instructed. )    BMI: Estimated body mass index is 23.48 kg/m  as calculated from the following:    Height as of 1/15/24: 1.753 m (5' 9\").    Weight as of 1/15/24: 72.1 kg (159 lb).     Sedation Ordered  moderate sedation.   If patient BMI > 50 do not schedule in [facility].    If patient BMI > 45 do not schedule at [facility].    Are you taking methadone or Suboxone?  No    Have you had difficulties, pain, or discomfort during past endoscopy procedures?  No    Are you taking any prescription medications for pain 3 or more times per week?   NO - No RN review required.    Do you have a history of malignant hyperthermia or adverse reaction to anesthesia?  No    (Females) Are you currently pregnant?   No     Have you been diagnosed or told you have pulmonary hypertension?   No    Do you have an LVAD?  No    Have you been told you have moderate to severe sleep apnea?  No    Have you been told you have COPD, asthma, or any other lung disease?  No    Do you have any heart conditions?  No     Have you ever had an organ transplant?   No    Have you ever had or are you awaiting a heart or lung transplant?   No    Have you had a stroke or transient ischemic attack (TIA aka \"mini stroke\" in the last 6 months?   No    Have you been diagnosed with or been told you have cirrhosis of the liver?   No    Are you currently on dialysis?   No    Do you need assistance transferring?   No    BMI: Estimated body mass index is 23.48 kg/m  as calculated from the following:    Height as of 1/15/24: 1.753 m (5' 9\").    Weight as of 1/15/24: 72.1 kg (159 lb).     Is patients BMI > 40 and scheduling location [facility]?  No    Do you take " an injectable medication for weight loss or diabetes (excluding insulin)?  No    Do you take the medication Naltrexone?  No    Do you take blood thinners?  No       Prep   Are you currently on dialysis or do you have chronic kidney disease?  No    Do you have a diagnosis of diabetes?  No    Do you have a diagnosis of cystic fibrosis (CF)?  No    On a regular basis do you go 3 -5 days between bowel movements?  No    BMI > 40?  No    Preferred Pharmacy:        [id number] [place], [place] - [address] [address]  Phone: [phone] Fax: [phone]        Final Scheduling Details   Colonoscopy prep sent?  Standard MiraLAX    Procedure scheduled  Colonoscopy    Surgeon:  Kylah     Date of procedure:  4/30     Pre-OP / PAC:   No - Not required for this site.    Location  [facility] - [facility] - Patient preference.    Sedation   Moderate Sedation - Per order.      Patient Reminders:   You will receive a call from a Nurse to review instructions and health history.  This assessment must be completed prior to your procedure.  Failure to complete the Nurse assessment may result in the procedure being cancelled.      On the day of your procedure, please designate an adult(s) who can drive you home stay with you for the next 24 hours. The medicines used in the exam will make you sleepy. You will not be able to drive.      You cannot take public transportation, ride share services, or non-medical taxi service without a responsible caregiver.  Medical transport services are allowed with the requirement that a responsible caregiver will receive you at your destination.  We require that drivers and caregivers are confirmed prior to your procedure.

## 2024-01-26 ENCOUNTER — PATIENT OUTREACH (OUTPATIENT)
Dept: GASTROENTEROLOGY | Facility: CLINIC | Age: 74
End: 2024-01-26
Payer: COMMERCIAL

## 2024-04-17 ENCOUNTER — TELEPHONE (OUTPATIENT)
Dept: GASTROENTEROLOGY | Facility: CLINIC | Age: 74
End: 2024-04-17
Payer: COMMERCIAL

## 2024-04-17 NOTE — TELEPHONE ENCOUNTER
Pre assessment completed for upcoming procedure.      Procedure details:    Patient scheduled for Colonoscopy  on 4/30/24.     Arrival time: 0730. Procedure time 0815    Facility location: [facility]; [address], [place] [address]. Check in location: 2nd Floor at Surgery desk.    Sedation type: Conscious sedation     Pre op exam needed? N/A    Indication for procedure:   History of colonic polyps          Designated  policy reviewed. Instructed to have someone stay 6 hours post procedure.       Chart review:     Electronic implanted devices? No    Recent diagnosis of diverticulitis within the last 6 weeks?  No    Diabetic? No      Medication review:    Anticoagulants? No    NSAIDS? No    Other medication HOLDING recommendations:  N/A      Prep for procedure:     Bowel prep recommendation: Standard Miralax  Due to: standard bowel prep.    Prep instructions sent via Re[employer]     Reviewed procedure prep instructions.     Patient verbalized understanding and had no questions or concerns at this time.        Corinne Kliber, RN  Endoscopy Procedure Pre Assessment RN  [phone] option 4

## 2024-04-30 ENCOUNTER — HOSPITAL ENCOUNTER (OUTPATIENT)
Facility: AMBULATORY SURGERY CENTER | Age: 74
Discharge: HOME | End: 2024-04-30
Attending: SURGERY | Admitting: SURGERY
Payer: COMMERCIAL

## 2024-04-30 VITALS
SYSTOLIC BLOOD PRESSURE: 97 MMHG | OXYGEN SATURATION: 99 % | RESPIRATION RATE: 16 BRPM | TEMPERATURE: 98 F | HEART RATE: 57 BPM | DIASTOLIC BLOOD PRESSURE: 47 MMHG

## 2024-04-30 LAB — COLONOSCOPY: NORMAL

## 2024-04-30 PROCEDURE — 45385 COLONOSCOPY W/LESION REMOVAL: CPT | Mod: PT

## 2024-04-30 PROCEDURE — G8918 PT W/O PREOP ORDER IV AB PRO: HCPCS

## 2024-04-30 PROCEDURE — 88305 TISSUE EXAM BY PATHOLOGIST: CPT | Performed by: PATHOLOGY

## 2024-04-30 PROCEDURE — G8907 PT DOC NO EVENTS ON DISCHARG: HCPCS

## 2024-04-30 RX ORDER — ONDANSETRON 4 MG/1
4 TABLET, ORALLY DISINTEGRATING ORAL EVERY 6 HOURS PRN
Status: CANCELLED | OUTPATIENT
Start: 2024-04-30

## 2024-04-30 RX ORDER — NALOXONE HYDROCHLORIDE 0.4 MG/ML
0.4 INJECTION, SOLUTION INTRAMUSCULAR; INTRAVENOUS; SUBCUTANEOUS
Status: CANCELLED | OUTPATIENT
Start: 2024-04-30

## 2024-04-30 RX ORDER — FLUMAZENIL 0.1 MG/ML
0.2 INJECTION, SOLUTION INTRAVENOUS
Status: CANCELLED | OUTPATIENT
Start: 2024-04-30 | End: 2024-04-30

## 2024-04-30 RX ORDER — FENTANYL CITRATE 50 UG/ML
INJECTION, SOLUTION INTRAMUSCULAR; INTRAVENOUS PRN
Status: DISCONTINUED | OUTPATIENT
Start: 2024-04-30 | End: 2024-04-30 | Stop reason: HOSPADM

## 2024-04-30 RX ORDER — NALOXONE HYDROCHLORIDE 0.4 MG/ML
0.2 INJECTION, SOLUTION INTRAMUSCULAR; INTRAVENOUS; SUBCUTANEOUS
Status: CANCELLED | OUTPATIENT
Start: 2024-04-30

## 2024-04-30 RX ORDER — ONDANSETRON 2 MG/ML
4 INJECTION INTRAMUSCULAR; INTRAVENOUS EVERY 6 HOURS PRN
Status: CANCELLED | OUTPATIENT
Start: 2024-04-30

## 2024-04-30 RX ORDER — ONDANSETRON 2 MG/ML
4 INJECTION INTRAMUSCULAR; INTRAVENOUS
Status: DISCONTINUED | OUTPATIENT
Start: 2024-04-30 | End: 2024-05-01 | Stop reason: HOSPADM

## 2024-04-30 RX ORDER — LIDOCAINE 40 MG/G
CREAM TOPICAL
Status: DISCONTINUED | OUTPATIENT
Start: 2024-04-30 | End: 2024-05-01 | Stop reason: HOSPADM

## 2024-04-30 RX ORDER — PROCHLORPERAZINE MALEATE 5 MG
5 TABLET ORAL EVERY 6 HOURS PRN
Status: CANCELLED | OUTPATIENT
Start: 2024-04-30

## 2024-04-30 NOTE — H&P
Pre-Endoscopy History and Physical     Florencia Ross MRN# [id number]   YOB: 1950 Age: 73 year old     Date of Procedure: 4/30/2024  Primary care provider: Shanel Aguiar  Type of Endoscopy: colonoscopy  Reason for Procedure: history of polyp  Type of Anesthesia Anticipated: Moderate Sedation    HPI:    Florencia is a 73 year old female who will be undergoing the above procedure.    Tubular adenomas in 2019    A history and physical has been performed. The patient's medications and allergies have been reviewed. The risks and benefits of the procedure and the sedation options and risks were discussed with the patient.  All questions were answered and informed consent was obtained.      She denies a personal or family history of anesthesia complications or bleeding disorders.     Allergies   Allergen Reactions    Food Hives     Raspberries, asparagus    Raspberry Hives and Rash     Raspberries, asparagus   Raspberries, asparagus    Diatrizoate Other (See Comments)    Dye [Contrast Dye]     Iodine      Other reaction(s): Other, see comments  Stops breathing.        Cannot display prior to admission medications because the patient has not been admitted in this contact.     Current Outpatient Medications   Medication Sig Dispense Refill    acyclovir (ZOVIRAX) 400 MG tablet TAKE 1 TABLET BY MOUTH TWICE A  tablet 3    amLODIPine (NORVASC) 5 MG tablet Take 1 tablet (5 mg) by mouth 2 times daily 180 tablet 3    aspirin 81 MG EC tablet Take 1 tablet by mouth daily. 90 tablet 3    Cholecalciferol (VITAMIN D-3) 1000 UNITS CAPS 1,000 Units      losartan (COZAAR) 50 MG tablet Take 1 tablet (50 mg) by mouth 2 times daily 180 tablet 3    Multiple Vitamins-Calcium (ONE-A-DAY WOMENS PO) Take  by mouth.       No current facility-administered medications for this encounter.         Patient Active Problem List   Diagnosis    Hypertension goal BP (blood pressure) < 140/90    CARDIOVASCULAR SCREENING; LDL GOAL LESS  THAN 130    Recurrent genital herpes    Osteopenia    Wrinkling of orbital skin    Lentigo    KP (keratosis pilaris)    Angioma of skin    Photoaging of skin    Dysplastic nevus of right lower back s/p excision 7-23-15    Advanced directives, counseling/discussion        Past Medical History:   Diagnosis Date    Arthritis     Heart murmur     Hypertension     Nonsenile cataract         Past Surgical History:   Procedure Laterality Date    COLONOSCOPY      COLONOSCOPY  2019    4 polyps    COLONOSCOPY N/A 2019    Procedure: Combined Colonoscopy, Single Or Multiple Biopsy/Polypectomy By Biopsy;  Surgeon: Arun Ruano MD;  Location: [facility]    COLONOSCOPY  2024    COLONOSCOPY WITH CO2 INSUFFLATION N/A 2019    Procedure: COLONOSCOPY WITH CO2 INSUFFLATION;  Surgeon: Arun Ruano MD;  Location: [facility]    COMBINED CYSTOSCOPY, INSERT STENT URETER(S) Right 2019    Procedure: CYSTOSCOPY, WITH URETERAL STENT INSERTION;  Surgeon: Ramiro Espitia MD;  Location: [facility]    COMBINED CYSTOSCOPY, URETEROSCOPY, LASER HOLMIUM LITHOTRIPSY URETER(S) Right 2019    Procedure: COMBINED CYSTOSCOPY, URETEROSCOPY, BASKET REMOVAL OF LESION;  Surgeon: Ramiro Espitia MD;  Location: [facility] OR    GYN SURGERY      c section       Social History     Tobacco Use    Smoking status: Former     Current packs/day: 0.00     Types: Cigarettes     Quit date: 1977     Years since quittin.8    Smokeless tobacco: Never   Substance Use Topics    Alcohol use: Yes     Comment: 5-7 drinks a week       Family History   Problem Relation Age of Onset    Cerebrovascular Disease Mother     Glaucoma Mother 80        old age     Diabetes Mother     Hypertension Father     Breast Cancer Sister     Anesthesia Reaction Brother     Macular Degeneration No family hx of        REVIEW OF SYSTEMS:     5 point ROS negative except as noted above in HPI, including Gen., Resp., CV, GI &  system  "review.      PHYSICAL EXAM:   /63   Pulse 60   Temp 98  F (36.7  C) (Temporal)   Resp 16   LMP  (LMP Unknown)   SpO2 97%  Estimated body mass index is 23.48 kg/m  as calculated from the following:    Height as of 1/15/24: 1.753 m (5' 9\").    Weight as of 1/15/24: 72.1 kg (159 lb).   GENERAL APPEARANCE: healthy, alert, and no distress  MENTAL STATUS: alert  AIRWAY EXAM: Mallampatti Class II (visualization of the soft palate, fauces, and uvula)  RESP: lungs clear to auscultation - no rales, rhonchi or wheezes  CV: regular rates and rhythm      DIAGNOSTICS:    Not indicated      IMPRESSION   ASA Class 2 - Mild systemic disease        PLAN:       Plan for colonoscopy. We discussed the risks, benefits and alternatives and the patient wished to proceed.    The above has been forwarded to the consulting provider.      Signed Electronically by: Daniele Montero MD  April 30, 2024    "

## 2024-05-02 LAB
PATH REPORT.COMMENTS IMP SPEC: NORMAL
PATH REPORT.COMMENTS IMP SPEC: NORMAL
PATH REPORT.FINAL DX SPEC: NORMAL
PATH REPORT.GROSS SPEC: NORMAL
PATH REPORT.MICROSCOPIC SPEC OTHER STN: NORMAL
PATH REPORT.RELEVANT HX SPEC: NORMAL
PHOTO IMAGE: NORMAL

## 2024-06-17 PROBLEM — Z71.89 ADVANCED DIRECTIVES, COUNSELING/DISCUSSION: Status: RESOLVED | Noted: 2018-05-17 | Resolved: 2024-06-17

## 2024-10-15 ENCOUNTER — TELEPHONE (OUTPATIENT)
Dept: OPHTHALMOLOGY | Facility: CLINIC | Age: 74
End: 2024-10-15
Payer: COMMERCIAL

## 2024-10-15 NOTE — TELEPHONE ENCOUNTER
Spoke with patient - last April she had glasses made at [facility] from the January prescription, but felt her vision was worse than her present glasses, so she did not keep them.  She is considering getting new glasses again, but is not having any specific vision issues currently.  I offered to schedule an appointment for a refraction, but she is wondering if she should just wait until January when she is due for her Comprehensive Eye Exam.  Within our conversation, she decided to wait until January.  Appointment scheduled for January 22, 2025 for a Comprehensive Eye Exam with Dr. Apple.

## 2024-10-15 NOTE — TELEPHONE ENCOUNTER
[facility] Call Center    Phone Message    May a detailed message be left on voicemail: yes     Reason for Call: Appointment Intake    Referring Provider Name: N/A  Diagnosis and/or Symptoms: pt is wanting to see Dr Apple per MAKENZIE's from 4/24 pts glasses RX is not correct, writers next available is 12/24 and pt is not due to be seen again until 1/17/25, pt would like to request a refraction sooner than December. Please contact pt to discuss options Thank you     Action Taken: Message routed to:  [facility] eye    Travel Screening: Not Applicable     Date of Service:

## 2024-10-26 ENCOUNTER — ANCILLARY PROCEDURE (OUTPATIENT)
Dept: GENERAL RADIOLOGY | Facility: CLINIC | Age: 74
End: 2024-10-26
Attending: FAMILY MEDICINE
Payer: COMMERCIAL

## 2024-10-26 ENCOUNTER — OFFICE VISIT (OUTPATIENT)
Dept: URGENT CARE | Facility: URGENT CARE | Age: 74
End: 2024-10-26
Payer: COMMERCIAL

## 2024-10-26 VITALS
HEART RATE: 80 BPM | TEMPERATURE: 97.3 F | BODY MASS INDEX: 22.45 KG/M2 | SYSTOLIC BLOOD PRESSURE: 146 MMHG | RESPIRATION RATE: 16 BRPM | DIASTOLIC BLOOD PRESSURE: 75 MMHG | WEIGHT: 152 LBS | OXYGEN SATURATION: 97 %

## 2024-10-26 DIAGNOSIS — R05.1 ACUTE COUGH: ICD-10-CM

## 2024-10-26 DIAGNOSIS — J06.9 UPPER RESPIRATORY TRACT INFECTION, UNSPECIFIED TYPE: Primary | ICD-10-CM

## 2024-10-26 PROCEDURE — 99213 OFFICE O/P EST LOW 20 MIN: CPT | Performed by: FAMILY MEDICINE

## 2024-10-26 PROCEDURE — 71046 X-RAY EXAM CHEST 2 VIEWS: CPT | Mod: TC | Performed by: RADIOLOGY

## 2024-10-26 NOTE — PROGRESS NOTES
Assessment & Plan     Upper respiratory tract infection, unspecified type  Differentials discussed in detail including upper respiratory tract infection.  Home COVID-19 test was negative.  Chest x-ray findings reviewed independently which showed no acute abnormality.  Recommended well hydration, warm fluids, over-the-counter analgesia/antitussive and to follow-up if symptoms persist or worsen.  Patient understood and in agreement with above plan.  All questions answered.    Acute cough  - XR Chest 2 Views; Future        Subjective   Florencia is a 74 year old, presenting for the following health issues:  Urgent Care (c/o persistent cough, chest congestion, runny stuffy nose and fatigue for a few days. )    HPI   Concern -  Onset: 4 days   Description: cough, congestion, fatigue and stuffy nose   Intensity: moderate  Progression of Symptoms:  same  Accompanying Signs & Symptoms: No fever, chills, throat, shortness of breath or other systemic symptoms  Previous history of similar problem:   Therapies tried and outcome: rest, orange juice  Home Covid 19 test was negative       Review of Systems  Constitutional, HEENT, cardiovascular, pulmonary, gi and gu systems are negative, except as otherwise noted.      Objective    BP (!) 146/75   Pulse 80   Temp 97.3  F (36.3  C) (Tympanic)   Resp 16   Wt 68.9 kg (152 lb)   LMP  (LMP Unknown)   SpO2 97%   BMI 22.45 kg/m    Body mass index is 22.45 kg/m .  Physical Exam   GENERAL: alert and no distress  EYES: Eyes grossly normal to inspection, PERRL and conjunctivae and sclerae normal  HENT: normal cephalic/atraumatic, ear canals and TM's normal, nose and mouth without ulcers or lesions, oropharynx clear, and oral mucous membranes moist  NECK: no adenopathy, no asymmetry, masses, or scars  RESP: lungs clear to auscultation - no rales, rhonchi or wheezes  CV: regular rates and rhythm, normal S1 S2, no S3 or S4, and no murmur, click or rub  ABDOMEN: soft, nontender  MS: no gross  musculoskeletal defects noted, no edema  SKIN: no suspicious lesions or rashes  NEURO: Normal strength and tone, mentation intact and speech normal  PSYCH: mentation appears normal, affect normal/bright      Signed Electronically by: Elvin Carlson MD

## 2024-10-30 ENCOUNTER — OFFICE VISIT (OUTPATIENT)
Dept: URGENT CARE | Facility: URGENT CARE | Age: 74
End: 2024-10-30
Payer: COMMERCIAL

## 2024-10-30 ENCOUNTER — ANCILLARY PROCEDURE (OUTPATIENT)
Dept: GENERAL RADIOLOGY | Facility: CLINIC | Age: 74
End: 2024-10-30
Attending: STUDENT IN AN ORGANIZED HEALTH CARE EDUCATION/TRAINING PROGRAM
Payer: COMMERCIAL

## 2024-10-30 ENCOUNTER — TELEPHONE (OUTPATIENT)
Dept: URGENT CARE | Facility: URGENT CARE | Age: 74
End: 2024-10-30

## 2024-10-30 VITALS
DIASTOLIC BLOOD PRESSURE: 86 MMHG | RESPIRATION RATE: 16 BRPM | OXYGEN SATURATION: 96 % | SYSTOLIC BLOOD PRESSURE: 137 MMHG | HEART RATE: 87 BPM | TEMPERATURE: 97.8 F

## 2024-10-30 DIAGNOSIS — R05.1 ACUTE COUGH: ICD-10-CM

## 2024-10-30 DIAGNOSIS — J45.21 MILD INTERMITTENT REACTIVE AIRWAY DISEASE WITH ACUTE EXACERBATION: Primary | ICD-10-CM

## 2024-10-30 PROCEDURE — 99214 OFFICE O/P EST MOD 30 MIN: CPT | Performed by: STUDENT IN AN ORGANIZED HEALTH CARE EDUCATION/TRAINING PROGRAM

## 2024-10-30 PROCEDURE — 71046 X-RAY EXAM CHEST 2 VIEWS: CPT | Mod: TC | Performed by: RADIOLOGY

## 2024-10-30 PROCEDURE — 87798 DETECT AGENT NOS DNA AMP: CPT | Performed by: STUDENT IN AN ORGANIZED HEALTH CARE EDUCATION/TRAINING PROGRAM

## 2024-10-30 RX ORDER — BENZONATATE 200 MG/1
200 CAPSULE ORAL 3 TIMES DAILY PRN
Qty: 30 CAPSULE | Refills: 1 | Status: SHIPPED | OUTPATIENT
Start: 2024-10-30

## 2024-10-30 RX ORDER — FLUTICASONE PROPIONATE 110 UG/1
1 AEROSOL, METERED RESPIRATORY (INHALATION) 2 TIMES DAILY
Qty: 12 G | Refills: 0 | Status: SHIPPED | OUTPATIENT
Start: 2024-10-30

## 2024-10-30 RX ORDER — ALBUTEROL SULFATE 90 UG/1
2 INHALANT RESPIRATORY (INHALATION) EVERY 6 HOURS PRN
Qty: 18 G | Refills: 0 | Status: SHIPPED | OUTPATIENT
Start: 2024-10-30

## 2024-10-30 ASSESSMENT — PAIN SCALES - GENERAL: PAINLEVEL_OUTOF10: NO PAIN (0)

## 2024-10-30 NOTE — PROGRESS NOTES
Assessment & Plan     Mild intermittent reactive airway disease with acute exacerbation  Patient has spasmodic cough of the upper airways with wheezing and shortness of breath this morning with the cough. She is not having consistent shortness of breath, just associated with the cough. She wants to avoid prednisone as her mother had prednisone induced diabetes and another family member had complications from prednisone. Will treat for reactive airway disorder with inhaled steroid twice daily and albuterol inhaler as needed. Tessalon perles to help with cough suppression. CXR reviewed by me and no infiltrate or effusion. Pertussis test in process. If positive, patient has allergy to erythromycin so azithromycin not an option. Can treat with Bactrim twice daily for 14 days per [place] guidelines for treatment alternative.  - albuterol (PROAIR HFA/PROVENTIL HFA/VENTOLIN HFA) 108 (90 Base) MCG/ACT inhaler  Dispense: 18 g; Refill: 0  - fluticasone (FLOVENT HFA) 110 MCG/ACT inhaler  Dispense: 12 g; Refill: 0    Acute cough  - benzonatate (TESSALON) 200 MG capsule  Dispense: 30 capsule; Refill: 1  - XR Chest 2 Views  - B. pertussis/parapertussis PCR-NP     30 minutes spent by me on the date of the encounter doing chart review, review of test results, interpretation of tests, patient visit, and documentation         No follow-ups on file.    TEREZA Vora [facility]     Florencia is a 74 year old female who presents to clinic today for the following health issues:  Chief Complaint   Patient presents with    Urgent Care    Cough     X's 1 week, coughing is getting out of control pt has been choking while coughing at the same time     Shortness of Breath       HPI          Review of Systems  Constitutional, HEENT, cardiovascular, pulmonary, GI, , musculoskeletal, neuro, skin, endocrine and psych systems are negative, except as otherwise noted.      Objective    /86    Pulse 87   Temp 97.8  F (36.6  C) (Tympanic)   Resp 16   LMP  (LMP Unknown)   SpO2 96%   Physical Exam   GENERAL: alert and no distress  EYES: Eyes grossly normal to inspection, PERRL and conjunctivae and sclerae normal  NECK: no adenopathy, no asymmetry, masses, or scars  RESP: lungs clear to auscultation - no rales, rhonchi or wheezes  CV: regular rate and rhythm, normal S1 S2, no S3 or S4, no murmur, click or rub  MS: no gross musculoskeletal defects noted, no edema  SKIN: no suspicious lesions or rashes  NEURO: Normal strength and tone, mentation intact and speech normal  PSYCH: mentation appears normal, affect normal/bright    Results for orders placed or performed in visit on 10/30/24   XR Chest 2 Views     Status: None    Narrative    CHEST TWO VIEWS  10/30/2024 10:56 AM     HISTORY: Persistent cough and fatigue. Rule out pneumonia. Acute  cough.    COMPARISON: Chest x-ray 10/26/2024.      Impression    IMPRESSION: No acute cardiopulmonary disease.    PORSHA GUADALUPE MD         SYSTEM ID:  [id number]

## 2024-10-30 NOTE — TELEPHONE ENCOUNTER
"Pt is calling wanting to know if the provider feels like she is contagious. Discussed contagious period with pt. Pt stated \"I was not asking you, I would like to know from the provider that saw me this morning\".     Routing to provider - Pt asking if you feel like she is contagious with her cough. Pt advised that you can reach out with your answer via [employer].     Thank you - Antonietta Tobar, KAMERONN, RN    "

## 2024-10-31 LAB
B PARAPERT DNA SPEC QL NAA+PROBE: NOT DETECTED
B PERT DNA SPEC QL NAA+PROBE: NOT DETECTED

## 2024-10-31 NOTE — TELEPHONE ENCOUNTER
I sent a My Chart message with her pertussis results and indicated timeline for when she is considered contagious.  Bette Conklin, CNP

## 2024-12-30 ENCOUNTER — OFFICE VISIT (OUTPATIENT)
Dept: URGENT CARE | Facility: URGENT CARE | Age: 74
End: 2024-12-30
Payer: COMMERCIAL

## 2024-12-30 VITALS
HEART RATE: 69 BPM | SYSTOLIC BLOOD PRESSURE: 165 MMHG | WEIGHT: 154.8 LBS | BODY MASS INDEX: 22.86 KG/M2 | OXYGEN SATURATION: 97 % | RESPIRATION RATE: 16 BRPM | TEMPERATURE: 98 F | DIASTOLIC BLOOD PRESSURE: 86 MMHG

## 2024-12-30 DIAGNOSIS — Z20.822 EXPOSURE TO 2019 NOVEL CORONAVIRUS: Primary | ICD-10-CM

## 2024-12-30 DIAGNOSIS — I10 HYPERTENSION GOAL BP (BLOOD PRESSURE) < 140/90: ICD-10-CM

## 2024-12-30 DIAGNOSIS — J02.9 SORE THROAT: ICD-10-CM

## 2024-12-30 LAB
DEPRECATED S PYO AG THROAT QL EIA: NEGATIVE
FLUAV AG SPEC QL IA: NEGATIVE
FLUBV AG SPEC QL IA: NEGATIVE
S PYO DNA THROAT QL NAA+PROBE: NOT DETECTED

## 2024-12-30 PROCEDURE — 87804 INFLUENZA ASSAY W/OPTIC: CPT | Performed by: PHYSICIAN ASSISTANT

## 2024-12-30 PROCEDURE — 99204 OFFICE O/P NEW MOD 45 MIN: CPT | Performed by: PHYSICIAN ASSISTANT

## 2024-12-30 PROCEDURE — 87651 STREP A DNA AMP PROBE: CPT | Performed by: PHYSICIAN ASSISTANT

## 2024-12-30 PROCEDURE — 87635 SARS-COV-2 COVID-19 AMP PRB: CPT | Performed by: PHYSICIAN ASSISTANT

## 2024-12-30 RX ORDER — LOSARTAN POTASSIUM 50 MG/1
50 TABLET ORAL 2 TIMES DAILY
Qty: 180 TABLET | Refills: 0 | Status: SHIPPED | OUTPATIENT
Start: 2024-12-30

## 2024-12-30 ASSESSMENT — ENCOUNTER SYMPTOMS
RHINORRHEA: 0
SHORTNESS OF BREATH: 0
NAUSEA: 0
CHILLS: 1
MYALGIAS: 1
HEADACHES: 1
DIARRHEA: 0
FEVER: 0
VOMITING: 0
SORE THROAT: 1
COUGH: 1

## 2024-12-30 NOTE — PROGRESS NOTES
SUBJECTIVE:   Florencia Ross is a 74 year old female presenting with a chief complaint of   Chief Complaint   Patient presents with    Icsi - Strep     Sore throat, cough, headache, chills. Sx started yesterday.        She is an established patient of [place].  Patient presents with ST a few days ago.  Today productive cough.  HA.    Treatment:  cough gtts.      Review of Systems   Constitutional:  Positive for chills. Negative for fever.   HENT:  Positive for sore throat. Negative for congestion, ear pain and rhinorrhea.    Respiratory:  Positive for cough. Negative for shortness of breath.    Cardiovascular:  Negative for chest pain.   Gastrointestinal:  Negative for diarrhea, nausea and vomiting.   Musculoskeletal:  Positive for myalgias.   Skin:  Negative for rash.   Neurological:  Positive for headaches.   All other systems reviewed and are negative.      Past Medical History:   Diagnosis Date    Arthritis     Heart murmur     Hypertension     Nonsenile cataract      Family History   Problem Relation Age of Onset    Cerebrovascular Disease Mother     Glaucoma Mother 80        old age     Diabetes Mother     Hypertension Father     Breast Cancer Sister     Anesthesia Reaction Brother     Macular Degeneration No family hx of      Current Outpatient Medications   Medication Sig Dispense Refill    acyclovir (ZOVIRAX) 400 MG tablet TAKE 1 TABLET BY MOUTH TWICE A  tablet 3    albuterol (PROAIR HFA/PROVENTIL HFA/VENTOLIN HFA) 108 (90 Base) MCG/ACT inhaler Inhale 2 puffs into the lungs every 6 hours as needed for shortness of breath, wheezing or cough. 18 g 0    amLODIPine (NORVASC) 5 MG tablet Take 1 tablet (5 mg) by mouth 2 times daily 180 tablet 3    aspirin 81 MG EC tablet Take 1 tablet by mouth daily. 90 tablet 3    benzonatate (TESSALON) 200 MG capsule Take 1 capsule (200 mg) by mouth 3 times daily as needed for cough. 30 capsule 1    Cholecalciferol (VITAMIN D-3) 1000 UNITS CAPS 1,000 Units       fluticasone (FLOVENT HFA) 110 MCG/ACT inhaler Inhale 1 puff into the lungs 2 times daily. 12 g 0    losartan (COZAAR) 50 MG tablet Take 1 tablet (50 mg) by mouth 2 times daily. 180 tablet 0    Multiple Vitamins-Calcium (ONE-A-DAY WOMENS PO) Take  by mouth.       Social History     Tobacco Use    Smoking status: Former     Current packs/day: 0.00     Types: Cigarettes     Quit date: 1977     Years since quittin.5    Smokeless tobacco: Never   Substance Use Topics    Alcohol use: Yes     Comment: 5-7 drinks a week       OBJECTIVE  BP (!) 165/86 (BP Location: Left arm, Cuff Size: Adult Regular)   Pulse 69   Temp 98  F (36.7  C) (Tympanic)   Resp 16   Wt 70.2 kg (154 lb 12.8 oz)   LMP  (LMP Unknown)   SpO2 97%   BMI 22.86 kg/m      Physical Exam  Vitals and nursing note reviewed.   Constitutional:       Appearance: Normal appearance. She is normal weight.   HENT:      Head: Normocephalic and atraumatic.      Right Ear: Tympanic membrane, ear canal and external ear normal.      Left Ear: Tympanic membrane, ear canal and external ear normal.      Nose: Nose normal.      Mouth/Throat:      Mouth: Mucous membranes are moist.      Pharynx: Oropharynx is clear. Posterior oropharyngeal erythema present.   Eyes:      Extraocular Movements: Extraocular movements intact.      Conjunctiva/sclera: Conjunctivae normal.   Cardiovascular:      Rate and Rhythm: Normal rate and regular rhythm.      Pulses: Normal pulses.      Heart sounds: Normal heart sounds.   Pulmonary:      Effort: Pulmonary effort is normal.      Breath sounds: Normal breath sounds.   Musculoskeletal:      Cervical back: Normal range of motion.   Skin:     General: Skin is warm and dry.      Findings: No rash.   Neurological:      General: No focal deficit present.      Mental Status: She is alert.   Psychiatric:         Mood and Affect: Mood normal.         Behavior: Behavior normal.         Labs:  Results for orders placed or performed in visit  on 12/30/24 (from the past 24 hours)   Streptococcus A Rapid Screen w/Reflex to PCR - Clinic Collect    Specimen: Throat; Swab   Result Value Ref Range    Group A Strep antigen Negative Negative   Influenza A & B Antigen - Clinic Collect    Specimen: Nose; Swab   Result Value Ref Range    Influenza A antigen Negative Negative    Influenza B antigen Negative Negative    Narrative    Test results must be correlated with clinical data. If necessary, results should be confirmed by a molecular assay or viral culture.         ASSESSMENT:      ICD-10-CM    1. Exposure to 2019 novel coronavirus  Z20.822 COVID-19 Virus (Coronavirus) by PCR Nose      2. Sore throat  J02.9 Streptococcus A Rapid Screen w/Reflex to PCR - Clinic Collect     Influenza A & B Antigen - Clinic Collect     Group A Streptococcus PCR Throat Swab           Medical Decision Making:    Differential Diagnosis:  URI Adult/Peds:  Bronchitis-viral, Influenza, Strep pharyngitis, Viral pharyngitis, and Viral syndrome    Serious Comorbid Conditions:  Adult:   reviewed    PLAN:    Tylenol/motrin as needed.  Drink plenty of water.  Gargle with salt water.  May use chloraseptic spray as directed for ST.  Strep pcr pending.  Discussed and recommended CDC guidelines for self isolation.  COVID test pending.    Discussed reasons to seek immediate medical attention.  Additionally if no improvement or worsening in one week, may follow up with PCP and/or UC.      Followup:    If not improving or if condition worsens, follow up with your Primary Care Provider, If not improving or if conditions worsens over the next 12-24 hours, go to the Emergency Department    There are no Patient Instructions on file for this visit.

## 2024-12-31 LAB — SARS-COV-2 RNA RESP QL NAA+PROBE: NEGATIVE

## 2025-01-04 ENCOUNTER — OFFICE VISIT (OUTPATIENT)
Dept: URGENT CARE | Facility: URGENT CARE | Age: 75
End: 2025-01-04
Payer: COMMERCIAL

## 2025-01-04 VITALS
BODY MASS INDEX: 22.74 KG/M2 | WEIGHT: 154 LBS | DIASTOLIC BLOOD PRESSURE: 81 MMHG | TEMPERATURE: 98.6 F | OXYGEN SATURATION: 98 % | SYSTOLIC BLOOD PRESSURE: 161 MMHG | HEART RATE: 76 BPM | RESPIRATION RATE: 16 BRPM

## 2025-01-04 DIAGNOSIS — H10.33 ACUTE BACTERIAL CONJUNCTIVITIS OF BOTH EYES: Primary | ICD-10-CM

## 2025-01-04 RX ORDER — POLYMYXIN B SULFATE AND TRIMETHOPRIM 1; 10000 MG/ML; [USP'U]/ML
SOLUTION OPHTHALMIC
Qty: 10 ML | Refills: 0 | Status: SHIPPED | OUTPATIENT
Start: 2025-01-04 | End: 2025-01-11

## 2025-01-04 NOTE — PATIENT INSTRUCTIONS
Great to meet you in clinic!      For eye infection (conjunctivitis)  - Likely caused by the virus you've had, but with goup-y output we will treat with antibiotic eye drops  - Return and be seen again if not improving with treatment      Pinkeye: Care Instructions  Overview     Pinkeye is redness and swelling of the eye surface and the conjunctiva (the lining of the eyelid and the covering of the white part of the eye). Pinkeye is also called conjunctivitis. Pinkeye is often caused by infection with bacteria or a virus. Dry air, allergies, smoke, and chemicals are other common causes.  Pinkeye often gets better on its own in 7 to 10 days. Antibiotics only help if the pinkeye is caused by bacteria. Pinkeye caused by infection spreads easily. If an allergy or chemical is causing pinkeye, it will not go away unless you can avoid whatever is causing it.  Follow-up care is a key part of your treatment and safety. Be sure to make and go to all appointments, and call your doctor if you are having problems. It's also a good idea to know your test results and keep a list of the medicines you take.  How can you care for yourself at home?  Wash your hands often. Always wash them before and after you treat pinkeye or touch your eyes or face.  Use moist cotton or a clean, wet cloth to remove crust. Wipe from the inside corner of the eye to the outside. Use a clean part of the cloth for each wipe.  Put cold or warm wet cloths on your eye a few times a day if the eye hurts.  Do not wear contact lenses or eye makeup until the pinkeye is gone. Throw away any eye makeup you were using when you got pinkeye. Clean your contacts and storage case. If you wear disposable contacts, use a new pair when your eye has cleared and it is safe to wear contacts again.  If the doctor gave you antibiotic ointment or eyedrops, use them as directed. Use the medicine for as long as instructed, even if your eye starts looking better soon. Keep the  "bottle tip clean, and do not let it touch the eye area.  To put in eyedrops or ointment:  Tilt your head back, and pull your lower eyelid down with one finger.  Drop or squirt the medicine inside the lower lid.  Close your eye for 30 to 60 seconds to let the drops or ointment move around.  Do not touch the ointment or dropper tip to your eyelashes or any other surface.  Do not share towels, pillows, or washcloths while you have pinkeye.  When should you call for help?   Call your doctor now or seek immediate medical care if:    You have pain in your eye, not just irritation on the surface.     You have a change in vision or loss of vision.     You have an increase in discharge from the eye.     Your eye has not started to improve or begins to get worse within 48 hours after you start using antibiotics.     Pinkeye lasts longer than 7 days.   Watch closely for changes in your health, and be sure to contact your doctor if you have any problems.  Where can you learn more?  Go to https://www.[employer].net/patiented  Enter Y392 in the search box to learn more about \"Pinkeye: Care Instructions.\"  Current as of: July 31, 2024  Content Version: 14.3    2024 [employer].   Care instructions adapted under license by your healthcare professional. If you have questions about a medical condition or this instruction, always ask your healthcare professional. [employer] disclaims any warranty or liability for your use of this information.    "

## 2025-01-04 NOTE — PROGRESS NOTES
Assessment & Plan     Acute bacterial conjunctivitis of both eyes  2 days of watery and red eyes.  Goopy output and crusted together in the morning.  Will treat for bacterial conjunctivitis.  - polymixin b-trimethoprim (POLYTRIM) 48214-1.1 UNIT/ML-% ophthalmic solution  Dispense: 10 mL; Refill: 0             No follow-ups on file.    Davin Quezada MD  [facility] [place]    Tan Don is a 74 year old female who presents to clinic today for the following health issues:  Chief Complaint   Patient presents with    Urgent Care     c/o pink eye - sx's of redness, matting and watering of eyes since Thursday night.     Conjunctivitis     2 days of runny and red eyes. Crusted together in the morning. Goup-y output. Not itchy. No vision changes. No fevers in the last few days.     Seen in urgent care about a week ago with a viral respiratory infection, overall improving, other than the new eye symptoms.    Eating and drinking ok.            Review of Systems        Objective    BP (!) 161/81   Pulse 76   Temp 98.6  F (37  C) (Tympanic)   Resp 16   Wt 69.9 kg (154 lb)   LMP  (LMP Unknown)   SpO2 98%   BMI 22.74 kg/m    Physical Exam   GENERAL: alert and no distress  EYES: Bilateral conjunctivitis, some dried crusting on eyelashes, EOMI, PERRL  RESP: lungs clear to auscultation - no rales, rhonchi or wheezes  CV: regular rate and rhythm, normal S1 S2, no S3 or S4, no murmur, click or rub, no peripheral edema  MS: no gross musculoskeletal defects noted, no edema  SKIN: no suspicious lesions or rashes  NEURO: mentation intact and speech normal  PSYCH: mentation appears normal, affect normal/bright

## 2025-01-11 DIAGNOSIS — A60.00 RECURRENT GENITAL HERPES: ICD-10-CM

## 2025-01-11 RX ORDER — ACYCLOVIR 400 MG/1
TABLET ORAL
Qty: 180 TABLET | Refills: 0 | Status: SHIPPED | OUTPATIENT
Start: 2025-01-11

## 2025-01-21 SDOH — HEALTH STABILITY: PHYSICAL HEALTH: ON AVERAGE, HOW MANY DAYS PER WEEK DO YOU ENGAGE IN MODERATE TO STRENUOUS EXERCISE (LIKE A BRISK WALK)?: 2 DAYS

## 2025-01-21 SDOH — HEALTH STABILITY: PHYSICAL HEALTH: ON AVERAGE, HOW MANY MINUTES DO YOU ENGAGE IN EXERCISE AT THIS LEVEL?: 30 MIN

## 2025-01-21 ASSESSMENT — ASTHMA QUESTIONNAIRES
QUESTION_5 LAST FOUR WEEKS HOW WOULD YOU RATE YOUR ASTHMA CONTROL: WELL CONTROLLED
QUESTION_4 LAST FOUR WEEKS HOW OFTEN HAVE YOU USED YOUR RESCUE INHALER OR NEBULIZER MEDICATION (SUCH AS ALBUTEROL): ONCE A WEEK OR LESS
ACT_TOTALSCORE: 19
QUESTION_2 LAST FOUR WEEKS HOW OFTEN HAVE YOU HAD SHORTNESS OF BREATH: ONCE OR TWICE A WEEK
ACT_TOTALSCORE: 19
QUESTION_1 LAST FOUR WEEKS HOW MUCH OF THE TIME DID YOUR ASTHMA KEEP YOU FROM GETTING AS MUCH DONE AT WORK, SCHOOL OR AT HOME: NONE OF THE TIME
QUESTION_3 LAST FOUR WEEKS HOW OFTEN DID YOUR ASTHMA SYMPTOMS (WHEEZING, COUGHING, SHORTNESS OF BREATH, CHEST TIGHTNESS OR PAIN) WAKE YOU UP AT NIGHT OR EARLIER THAN USUAL IN THE MORNING: TWO OR THREE NIGHTS A WEEK

## 2025-01-21 ASSESSMENT — SOCIAL DETERMINANTS OF HEALTH (SDOH): HOW OFTEN DO YOU GET TOGETHER WITH FRIENDS OR RELATIVES?: TWICE A WEEK

## 2025-01-22 ENCOUNTER — OFFICE VISIT (OUTPATIENT)
Dept: OPHTHALMOLOGY | Facility: CLINIC | Age: 75
End: 2025-01-22
Payer: COMMERCIAL

## 2025-01-22 DIAGNOSIS — H52.03 HYPEROPIA OF BOTH EYES WITH REGULAR ASTIGMATISM AND PRESBYOPIA: ICD-10-CM

## 2025-01-22 DIAGNOSIS — H25.813 COMBINED FORMS OF AGE-RELATED CATARACT OF BOTH EYES: ICD-10-CM

## 2025-01-22 DIAGNOSIS — H02.831 DERMATOCHALASIS OF BOTH UPPER EYELIDS: ICD-10-CM

## 2025-01-22 DIAGNOSIS — H10.33 ACUTE CONJUNCTIVITIS OF BOTH EYES, UNSPECIFIED ACUTE CONJUNCTIVITIS TYPE: Primary | ICD-10-CM

## 2025-01-22 DIAGNOSIS — H02.834 DERMATOCHALASIS OF BOTH UPPER EYELIDS: ICD-10-CM

## 2025-01-22 DIAGNOSIS — H52.4 HYPEROPIA OF BOTH EYES WITH REGULAR ASTIGMATISM AND PRESBYOPIA: ICD-10-CM

## 2025-01-22 DIAGNOSIS — H52.223 HYPEROPIA OF BOTH EYES WITH REGULAR ASTIGMATISM AND PRESBYOPIA: ICD-10-CM

## 2025-01-22 PROBLEM — M17.11 ARTHRITIS OF RIGHT KNEE: Status: ACTIVE | Noted: 2022-12-28

## 2025-01-22 RX ORDER — TOBRAMYCIN AND DEXAMETHASONE 3; 1 MG/ML; MG/ML
1 SUSPENSION/ DROPS OPHTHALMIC 3 TIMES DAILY
Qty: 5 ML | Refills: 0 | Status: SHIPPED | OUTPATIENT
Start: 2025-01-22 | End: 2025-01-29

## 2025-01-22 ASSESSMENT — VISUAL ACUITY
OD_CC: J1
OS_CC+: +1
OS_CC: J1
OD_CC: 20/25
OS_CC: 20/25
METHOD: SNELLEN - LINEAR
CORRECTION_TYPE: GLASSES

## 2025-01-22 ASSESSMENT — REFRACTION_MANIFEST
OS_CYLINDER: SPHERE
OD_AXIS: 030
OS_ADD: +2.50
OS_SPHERE: +2.50
OD_SPHERE: +2.25
OD_CYLINDER: +0.75
OD_ADD: +2.50

## 2025-01-22 ASSESSMENT — TONOMETRY
OD_IOP_MMHG: 18
OS_IOP_MMHG: 18
IOP_METHOD: APPLANATION

## 2025-01-22 ASSESSMENT — CONF VISUAL FIELD
OS_SUPERIOR_TEMPORAL_RESTRICTION: 0
OD_SUPERIOR_TEMPORAL_RESTRICTION: 0
OD_SUPERIOR_NASAL_RESTRICTION: 0
OS_SUPERIOR_NASAL_RESTRICTION: 0
OD_INFERIOR_NASAL_RESTRICTION: 0
OD_NORMAL: 1
OS_NORMAL: 1
OS_INFERIOR_NASAL_RESTRICTION: 0
OS_INFERIOR_TEMPORAL_RESTRICTION: 0
OD_INFERIOR_TEMPORAL_RESTRICTION: 0

## 2025-01-22 ASSESSMENT — REFRACTION_WEARINGRX
OS_CYLINDER: +1.00
OS_SPHERE: +1.75
OD_ADD: +2.50
OS_AXIS: 178
OD_CYLINDER: +1.00
SPECS_TYPE: PAL
OD_SPHERE: +2.00
OS_ADD: +2.50
OD_AXIS: 020

## 2025-01-22 ASSESSMENT — SLIT LAMP EXAM - LIDS: COMMENTS: 2+ DERMATOCHALASIS

## 2025-01-22 ASSESSMENT — EXTERNAL EXAM - RIGHT EYE: OD_EXAM: NORMAL

## 2025-01-22 ASSESSMENT — CUP TO DISC RATIO
OS_RATIO: 0.55
OD_RATIO: 0.5

## 2025-01-22 ASSESSMENT — EXTERNAL EXAM - LEFT EYE: OS_EXAM: NORMAL

## 2025-01-22 NOTE — PATIENT INSTRUCTIONS
Glasses prescription given     Begin Tobradex drops three times a day in both eyes for 7 days     Brenda Apple MD  [phone]

## 2025-01-22 NOTE — LETTER
"1/22/2025      Florencia Ross  [address] [address] [address]      Dear Colleague,    Thank you for referring your patient, Florencia Ross, to the [facility]. Please see a copy of my visit note below.     Current Eye Medications:  None for two weeks.      Subjective:  Comprehensive Eye Exam.   She requests a weaker dilating drop.  She felt her vision was worse with the new glasses that were made, so she did not keep them.  She had a cold virus about 3 weeks ago, and had 2 episodes of \"pink eye.\" (Treated with Polytrim, but she did not feel it helped).  At night, her eyes are weepy.  Vision is good with her present glasses, but reading tiny print might be more difficult.      Objective:  See Ophthalmology Exam.       Assessment:  Florencia Ross is a 74 year old female who presents with:   Encounter Diagnoses   Name Primary?     Acute conjunctivitis of both eyes, unspecified acute conjunctivitis type Recent URI. Gave option of short course of Tobradex (printed prescription).      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        Sincerely,        Brenda Apple MD    Electronically signed"

## 2025-01-22 NOTE — PROGRESS NOTES
" Current Eye Medications:  None for two weeks.      Subjective:  Comprehensive Eye Exam.   She requests a weaker dilating drop.  She felt her vision was worse with the new glasses that were made, so she did not keep them.  She had a cold virus about 3 weeks ago, and had 2 episodes of \"pink eye.\" (Treated with Polytrim, but she did not feel it helped).  At night, her eyes are weepy.  Vision is good with her present glasses, but reading tiny print might be more difficult.      Objective:  See Ophthalmology Exam.       Assessment:  Florencia Ross is a 74 year old female who presents with:   Encounter Diagnoses   Name Primary?    Acute conjunctivitis of both eyes, unspecified acute conjunctivitis type Recent URI. Gave option of short course of Tobradex (printed prescription).     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

## 2025-01-23 ENCOUNTER — OFFICE VISIT (OUTPATIENT)
Dept: FAMILY MEDICINE | Facility: CLINIC | Age: 75
End: 2025-01-23
Payer: COMMERCIAL

## 2025-01-23 VITALS
BODY MASS INDEX: 23.11 KG/M2 | SYSTOLIC BLOOD PRESSURE: 161 MMHG | OXYGEN SATURATION: 97 % | HEART RATE: 68 BPM | RESPIRATION RATE: 16 BRPM | WEIGHT: 156 LBS | HEIGHT: 69 IN | DIASTOLIC BLOOD PRESSURE: 74 MMHG | TEMPERATURE: 98.5 F

## 2025-01-23 DIAGNOSIS — A60.00 RECURRENT GENITAL HERPES: ICD-10-CM

## 2025-01-23 DIAGNOSIS — I10 HYPERTENSION GOAL BP (BLOOD PRESSURE) < 140/90: ICD-10-CM

## 2025-01-23 DIAGNOSIS — Z00.00 ENCOUNTER FOR MEDICARE ANNUAL WELLNESS EXAM: Primary | ICD-10-CM

## 2025-01-23 DIAGNOSIS — M85.851 OSTEOPENIA OF BOTH HIPS: ICD-10-CM

## 2025-01-23 DIAGNOSIS — M85.852 OSTEOPENIA OF BOTH HIPS: ICD-10-CM

## 2025-01-23 PROBLEM — Z86.018 HISTORY OF DYSPLASTIC NEVUS: Status: ACTIVE | Noted: 2019-04-23

## 2025-01-23 RX ORDER — ACYCLOVIR 400 MG/1
TABLET ORAL
Qty: 180 TABLET | Refills: 3 | Status: SHIPPED | OUTPATIENT
Start: 2025-01-23

## 2025-01-23 RX ORDER — AMLODIPINE BESYLATE 5 MG/1
5 TABLET ORAL
Qty: 180 TABLET | Refills: 3 | Status: SHIPPED | OUTPATIENT
Start: 2025-01-23

## 2025-01-23 RX ORDER — LOSARTAN POTASSIUM 50 MG/1
50 TABLET ORAL 2 TIMES DAILY
Qty: 180 TABLET | Refills: 0 | Status: SHIPPED | OUTPATIENT
Start: 2025-01-23

## 2025-01-23 RX ORDER — FLUTICASONE PROPIONATE 110 UG/1
1 AEROSOL, METERED RESPIRATORY (INHALATION) 2 TIMES DAILY
Qty: 12 G | Refills: 0 | Status: CANCELLED | OUTPATIENT
Start: 2025-01-23

## 2025-01-23 ASSESSMENT — PAIN SCALES - GENERAL: PAINLEVEL_OUTOF10: NO PAIN (0)

## 2025-01-23 NOTE — PATIENT INSTRUCTIONS
Patient Education   Preventive Care Advice   This is general advice given by our system to help you stay healthy. However, your care team may have specific advice just for you. Please talk to your care team about your preventive care needs.  Nutrition  Eat 5 or more servings of fruits and vegetables each day.  Try wheat bread, brown rice and whole grain pasta (instead of white bread, rice, and pasta).  Get enough calcium and vitamin D. Check the label on foods and aim for 100% of the RDA (recommended daily allowance).  Lifestyle  Exercise at least 150 minutes each week  (30 minutes a day, 5 days a week).  Do muscle strengthening activities 2 days a week. These help control your weight and prevent disease.  No smoking.  Wear sunscreen to prevent skin cancer.  Have a dental exam and cleaning every 6 months.  Yearly exams  See your health care team every year to talk about:  Any changes in your health.  Any medicines your care team has prescribed.  Preventive care, family planning, and ways to prevent chronic diseases.  Shots (vaccines)   HPV shots (up to age 26), if you've never had them before.  Hepatitis B shots (up to age 59), if you've never had them before.  COVID-19 shot: Get this shot when it's due.  Flu shot: Get a flu shot every year.  Tetanus shot: Get a tetanus shot every 10 years.  Pneumococcal, hepatitis A, and RSV shots: Ask your care team if you need these based on your risk.  Shingles shot (for age 50 and up)  General health tests  Diabetes screening:  Starting at age 35, Get screened for diabetes at least every 3 years.  If you are younger than age 35, ask your care team if you should be screened for diabetes.  Cholesterol test: At age 39, start having a cholesterol test every 5 years, or more often if advised.  Bone density scan (DEXA): At age 50, ask your care team if you should have this scan for osteoporosis (brittle bones).  Hepatitis C: Get tested at least once in your life.  STIs (sexually  transmitted infections)  Before age 24: Ask your care team if you should be screened for STIs.  After age 24: Get screened for STIs if you're at risk. You are at risk for STIs (including HIV) if:  You are sexually active with more than one person.  You don't use condoms every time.  You or a partner was diagnosed with a sexually transmitted infection.  If you are at risk for HIV, ask about PrEP medicine to prevent HIV.  Get tested for HIV at least once in your life, whether you are at risk for HIV or not.  Cancer screening tests  Cervical cancer screening: If you have a cervix, begin getting regular cervical cancer screening tests starting at age 21.  Breast cancer scan (mammogram): If you've ever had breasts, begin having regular mammograms starting at age 40. This is a scan to check for breast cancer.  Colon cancer screening: It is important to start screening for colon cancer at age 45.  Have a colonoscopy test every 10 years (or more often if you're at risk) Or, ask your provider about stool tests like a FIT test every year or Cologuard test every 3 years.  To learn more about your testing options, visit:   .  For help making a decision, visit:   https://bit.ly/bi81385.  Prostate cancer screening test: If you have a prostate, ask your care team if a prostate cancer screening test (PSA) at age 55 is right for you.  Lung cancer screening: If you are a current or former smoker ages 50 to 80, ask your care team if ongoing lung cancer screenings are right for you.  For informational purposes only. Not to replace the advice of your health care provider. Copyright   2023 [facility] [employer]. All rights reserved. Clinically reviewed by the [facility]. [employer] [id number] - REV 01/24.  Hearing Loss: Care Instructions  Overview     Hearing loss is a sudden or slow decrease in how well you hear. It can range from slight to profound. Permanent hearing loss can occur with aging. It also can  happen when you are exposed long-term to loud noise. Examples include listening to loud music, riding motorcycles, or being around other loud machines.  Hearing loss can affect your work and home life. It can make you feel lonely or depressed. You may feel that you have lost your independence. But hearing aids and other devices can help you hear better and feel connected to others.  Follow-up care is a key part of your treatment and safety. Be sure to make and go to all appointments, and call your doctor if you are having problems. It's also a good idea to know your test results and keep a list of the medicines you take.  How can you care for yourself at home?  Avoid loud noises whenever possible. This helps keep your hearing from getting worse.  Always wear hearing protection around loud noises.  Wear a hearing aid as directed.  A professional can help you pick a hearing aid that will work best for you.  You can also get hearing aids over the counter for mild to moderate hearing loss.  Have hearing tests as your doctor suggests. They can show whether your hearing has changed. Your hearing aid may need to be adjusted.  Use other devices as needed. These may include:  Telephone amplifiers and hearing aids that can connect to a television, stereo, radio, or microphone.  Devices that use lights or vibrations. These alert you to the doorbell, a ringing telephone, or a baby monitor.  Television closed-captioning. This shows the words at the bottom of the screen. Most new TVs can do this.  TTY (text telephone). This lets you type messages back and forth on the telephone instead of talking or listening. These devices are also called TDD. When messages are typed on the keyboard, they are sent over the phone line to a receiving TTY. The message is shown on a monitor.  Use text messaging, social media, and email if it is hard for you to communicate by telephone.  Try to learn a listening technique called speechreading. It is  "not lipreading. You pay attention to people's gestures, expressions, posture, and tone of voice. These clues can help you understand what a person is saying. Face the person you are talking to, and have them face you. Make sure the lighting is good. You need to see the other person's face clearly.  Think about counseling if you need help to adjust to your hearing loss.  When should you call for help?  Watch closely for changes in your health, and be sure to contact your doctor if:    You think your hearing is getting worse.     You have new symptoms, such as dizziness or nausea.   Where can you learn more?  Go to https://www.[employer]ne[employer].net/patiented  Enter R798 in the search box to learn more about \"Hearing Loss: Care Instructions.\"  Current as of: September 27, 2023  Content Version: 14.3    2024 [employer].   Care instructions adapted under license by your healthcare professional. If you have questions about a medical condition or this instruction, always ask your healthcare professional. [employer] disclaims any warranty or liability for your use of this information.    Learning About Sleeping Well  What does sleeping well mean?     Sleeping well means getting enough sleep to feel good and stay healthy. How much sleep is enough varies among people.  The number of hours you sleep and how you feel when you wake up are both important. If you do not feel refreshed, you probably need more sleep. Another sign of not getting enough sleep is feeling tired during the day.  Experts recommend that adults get at least 7 or more hours of sleep per day. Children and older adults need more sleep.  Why is getting enough sleep important?  Getting enough quality sleep is a basic part of good health. When your sleep suffers, your physical health, mood, and your thoughts can suffer too. You may find yourself feeling more grumpy or stressed. Not getting enough sleep also can lead to serious problems, including " "injury, accidents, anxiety, and depression.  What might cause poor sleeping?  Many things can cause sleep problems, including:  Changes to your sleep schedule.  Stress. Stress can be caused by fear about a single event, such as giving a speech. Or you may have ongoing stress, such as worry about work or school.  Depression, anxiety, and other mental or emotional conditions.  Changes in your sleep habits or surroundings. This includes changes that happen where you sleep, such as noise, light, or sleeping in a different bed. It also includes changes in your sleep pattern, such as having jet lag or working a late shift.  Health problems, such as pain, breathing problems, and restless legs syndrome.  Lack of regular exercise.  Using alcohol, nicotine, or caffeine before bed.  How can you help yourself?  Here are some tips that may help you sleep more soundly and wake up feeling more refreshed.  Your sleeping area   Use your bedroom only for sleeping and sex. A bit of light reading may help you fall asleep. But if it doesn't, do your reading elsewhere in the house. Try not to use your TV, computer, smartphone, or tablet while you are in bed.  Be sure your bed is big enough to stretch out comfortably, especially if you have a sleep partner.  Keep your bedroom quiet, dark, and cool. Use curtains, blinds, or a sleep mask to block out light. To block out noise, use earplugs, soothing music, or a \"white noise\" machine.  Your evening and bedtime routine   Create a relaxing bedtime routine. You might want to take a warm shower or bath, or listen to soothing music.  Go to bed at the same time every night. And get up at the same time every morning, even if you feel tired.  What to avoid   Limit caffeine (coffee, tea, caffeinated sodas) during the day, and don't have any for at least 6 hours before bedtime.  Avoid drinking alcohol before bedtime. Alcohol can cause you to wake up more often during the night.  Try not to smoke or " "use tobacco, especially in the evening. Nicotine can keep you awake.  Limit naps during the day, especially close to bedtime.  Avoid lying in bed awake for too long. If you can't fall asleep or if you wake up in the middle of the night and can't get back to sleep within about 20 minutes, get out of bed and go to another room until you feel sleepy.  Avoid taking medicine right before bed that may keep you awake or make you feel hyper or energized. Your doctor can tell you if your medicine may do this and if you can take it earlier in the day.  If you can't sleep   Imagine yourself in a peaceful, pleasant scene. Focus on the details and feelings of being in a place that is relaxing.  Get up and do a quiet or boring activity until you feel sleepy.  Avoid drinking any liquids before going to bed to help prevent waking up often to use the bathroom.  Where can you learn more?  Go to https://www.[employer].net/patiented  Enter J942 in the search box to learn more about \"Learning About Sleeping Well.\"  Current as of: July 31, 2024  Content Version: 14.3    2024 [employer].   Care instructions adapted under license by your healthcare professional. If you have questions about a medical condition or this instruction, always ask your healthcare professional. [employer] disclaims any warranty or liability for your use of this information.       "

## 2025-01-23 NOTE — PROGRESS NOTES
Preventive Care Visit  [facility]  Shanel Aguiar MD, Family Medicine  Jan 23, 2025      Assessment & Plan     Encounter for Medicare annual wellness exam      Hypertension goal BP (blood pressure) < 140/90  Elevated today, she will monitor at home and report back  - BASIC METABOLIC PANEL; Future  - Lipid panel reflex to direct LDL Non-fasting; Future  - amLODIPine (NORVASC) 5 MG tablet; Take 1 tablet (5 mg) by mouth 2 times daily.  - losartan (COZAAR) 50 MG tablet; Take 1 tablet (50 mg) by mouth 2 times daily.  - BASIC METABOLIC PANEL  - Lipid panel reflex to direct LDL Non-fasting    Osteopenia, bilateral hips  Regular exercise, calcium, vitamin D    Recurrent genital herpes  Refill   - acyclovir (ZOVIRAX) 400 MG tablet; TAKE 1 TABLET BY MOUTH TWICE A DAY    Patient has been advised of split billing requirements and indicates understanding: Yes    The longitudinal plan of care for the diagnosis(es)/condition(s) as documented were addressed during this visit. Due to the added complexity in care, I will continue to support Florencia in the subsequent management and with ongoing continuity of care.      Counseling  Appropriate preventive services were addressed with this patient via screening, questionnaire, or discussion as appropriate for fall prevention, nutrition, physical activity, Tobacco-use cessation, social engagement, weight loss and cognition.  Checklist reviewing preventive services available has been given to the patient.  Reviewed patient's diet, addressing concerns and/or questions.   She is at risk for lack of exercise and has been provided with information to increase physical activity for the benefit of her well-being.   Discussed possible causes of fatigue. The patient was provided with written information regarding signs of hearing loss.           Subjective   Florencia is a 74 year old, presenting for the following:  Physical        1/23/2025    10:39 AM   Additional Questions    Roomed by alexander   Accompanied by self     HPI  Been 2 urgent care x2 for viral URIs.  Was given inhaler flovent and albuterol  Insurance only covered the flovent  It was helpful but not needed now         Health Care Directive  Patient does not have a Health Care Directive: Patient states has Advance Directive and will bring in a copy to clinic.      1/21/2025   General Health   How would you rate your overall physical health? Good   Feel stress (tense, anxious, or unable to sleep) Only a little   (!) STRESS CONCERN      1/21/2025   Nutrition   Diet: Regular (no restrictions)         1/21/2025   Exercise   Days per week of moderate/strenous exercise 2 days   Average minutes spent exercising at this level 30 min   (!) EXERCISE CONCERN      1/21/2025   Social Factors   Frequency of gathering with friends or relatives Twice a week   Worry food won't last until get money to buy more No   Food not last or not have enough money for food? No   Do you have housing? (Housing is defined as stable permanent housing and does not include staying ouside in a car, in a tent, in an abandoned building, in an overnight shelter, or couch-surfing.) Yes   Are you worried about losing your housing? No   Lack of transportation? No   Unable to get utilities (heat,electricity)? No         1/21/2025   Fall Risk   Fallen 2 or more times in the past year? No    No   Trouble with walking or balance? No    No       Multiple values from one day are sorted in reverse-chronological order          1/21/2025   Activities of Daily Living- Home Safety   Needs help with the following daily activites None of the above   Safety concerns in the home None of the above         1/21/2025   Dental   Dentist two times every year? Yes         1/21/2025   Hearing Screening   Hearing concerns? (!) IT'S HARD TO FOLLOW A CONVERSATION IN A NOISY RESTAURANT OR CROWDED ROOM.         1/21/2025   Driving Risk Screening   Patient/family members have concerns about  driving No         2025   General Alertness/Fatigue Screening   Have you been more tired than usual lately? (!) YES         2025   Urinary Incontinence Screening   Bothered by leaking urine in past 6 months No         2025   TB Screening   Were you born outside of the [place]? No         Today's PHQ-2 Score:       2025    10:30 AM   PHQ-2 (  [employer])   Q1: Little interest or pleasure in doing things 0   Q2: Feeling down, depressed or hopeless 0   PHQ-2 Score 0    Q1: Little interest or pleasure in doing things Not at all   Q2: Feeling down, depressed or hopeless Not at all   PHQ-2 Score 0       Patient-reported           2025   Substance Use   Alcohol more than 3/day or more than 7/wk No   Do you have a current opioid prescription? No   How severe/bad is pain from 1 to 10? 0/10 (No Pain)   Do you use any other substances recreationally? No     Social History     Tobacco Use    Smoking status: Former     Current packs/day: 0.00     Types: Cigarettes     Quit date: 1977     Years since quittin.5    Smokeless tobacco: Never   Vaping Use    Vaping status: Never Used   Substance Use Topics    Alcohol use: Yes     Comment: 5-7 drinks a week    Drug use: No           2021   LAST FHS-7 RESULTS   1st degree relative breast or ovarian cancer Yes   Any relative bilateral breast cancer Unknown   Any male have breast cancer No   Any ONE woman have BOTH breast AND ovarian cancer Yes   Any woman with breast cancer before 50yrs No   2 or more relatives with breast AND/OR ovarian cancer Yes   2 or more relatives with breast AND/OR bowel cancer Yes        Mammogram Screening - Mammogram every 1-2 years updated in Health Maintenance based on mutual decision making    ASCVD Risk   The 10-year ASCVD risk score (Reece PURCELL, et al., 2019) is: 23.4%    Values used to calculate the score:      Age: 74 years      Sex: Female      Is Non- : No      Diabetic: No       "Tobacco smoker: No      Systolic Blood Pressure: 161 mmHg      Is BP treated: Yes      HDL Cholesterol: 56 mg/dL      Total Cholesterol: 196 mg/dL            Reviewed and updated as needed this visit by Provider                      Current providers sharing in care for this patient include:  Patient Care Team:  Shanel Aguiar MD as PCP - General (Family Practice)  Nissen, Rick L, MD as MD (Otolaryngology)  Tonie Gomez AuD as Audiologist (Audiology)  Shanel Aguiar MD as Assigned PCP  Brenda Apple MD as MD (Ophthalmology)  Brenda Apple MD as Assigned Surgical Provider    The following health maintenance items are reviewed in Epic and correct as of today:  Health Maintenance   Topic Date Due    ANNUAL REVIEW OF HM ORDERS  Never done    ASTHMA ACTION PLAN  Never done    ZOSTER IMMUNIZATION (1 of 2) Never done    RSV VACCINE (1 - Risk 60-74 years 1-dose series) Never done    BMP  01/15/2025    LIPID  01/15/2025    MEDICARE ANNUAL WELLNESS VISIT  01/15/2025    ASTHMA CONTROL TEST  07/23/2025    FALL RISK ASSESSMENT  01/23/2026    MAMMO SCREENING  04/23/2026    DTAP/TDAP/TD IMMUNIZATION (2 - Td or Tdap) 07/21/2026    GLUCOSE  01/15/2027    ADVANCE CARE PLANNING  01/15/2029    COLORECTAL CANCER SCREENING  04/30/2029    DEXA  12/18/2034    HEPATITIS C SCREENING  Completed    PHQ-2 (once per calendar year)  Completed    INFLUENZA VACCINE  Completed    COVID-19 Vaccine  Completed    Pneumococcal Vaccine: 50+ Years  Addressed    HPV IMMUNIZATION  Aged Out    MENINGITIS IMMUNIZATION  Aged Out    RSV MONOCLONAL ANTIBODY  Aged Out         Review of Systems  Constitutional, HEENT, cardiovascular, pulmonary, gi and gu systems are negative, except as otherwise noted.     Objective    Exam  BP (!) 161/74   Pulse 68   Temp 98.5  F (36.9  C) (Oral)   Resp 16   Ht 1.753 m (5' 9\")   Wt 70.8 kg (156 lb)   LMP  (LMP Unknown)   SpO2 97%   BMI 23.04 kg/m     Estimated body mass index is 23.04 kg/m  " "as calculated from the following:    Height as of this encounter: 1.753 m (5' 9\").    Weight as of this encounter: 70.8 kg (156 lb).    Physical Exam  GENERAL: alert and no distress  EYES: Eyes grossly normal to inspection, PERRL and conjunctivae and sclerae normal  HENT: ear canals and TM's normal, nose and mouth without ulcers or lesions  NECK: no adenopathy, no asymmetry, masses, or scars  RESP: lungs clear to auscultation - no rales, rhonchi or wheezes  CV: regular rate and rhythm, normal S1 S2, no S3 or S4, no murmur, click or rub, no peripheral edema  ABDOMEN: soft, nontender, no hepatosplenomegaly, no masses and bowel sounds normal  MS: no gross musculoskeletal defects noted, no edema  SKIN: no suspicious lesions or rashes  NEURO: Normal strength and tone, mentation intact and speech normal  PSYCH: mentation appears normal, affect normal/bright        1/23/2025   Mini Cog   Mini-Cog Not Completed (choose reason) Patient declines       Patient declines, there are NO concerns for cognitive deficits.           Signed Electronically by: Shanel Aguiar MD    "

## 2025-02-06 ENCOUNTER — DOCUMENTATION ONLY (OUTPATIENT)
Dept: OTHER | Facility: CLINIC | Age: 75
End: 2025-02-06
Payer: COMMERCIAL

## 2025-03-30 ENCOUNTER — HEALTH MAINTENANCE LETTER (OUTPATIENT)
Age: 75
End: 2025-03-30

## 2025-04-30 ENCOUNTER — TRANSFERRED RECORDS (OUTPATIENT)
Dept: HEALTH INFORMATION MANAGEMENT | Facility: CLINIC | Age: 75
End: 2025-04-30
Payer: COMMERCIAL

## 2025-05-11 ENCOUNTER — OFFICE VISIT (OUTPATIENT)
Dept: URGENT CARE | Facility: URGENT CARE | Age: 75
End: 2025-05-11
Payer: COMMERCIAL

## 2025-05-11 VITALS
HEIGHT: 69 IN | WEIGHT: 153 LBS | SYSTOLIC BLOOD PRESSURE: 153 MMHG | TEMPERATURE: 97.3 F | RESPIRATION RATE: 16 BRPM | HEART RATE: 66 BPM | BODY MASS INDEX: 22.66 KG/M2 | DIASTOLIC BLOOD PRESSURE: 82 MMHG | OXYGEN SATURATION: 98 %

## 2025-05-11 DIAGNOSIS — I10 HYPERTENSION GOAL BP (BLOOD PRESSURE) < 140/90: ICD-10-CM

## 2025-05-11 DIAGNOSIS — L23.7 CONTACT DERMATITIS DUE TO POISON IVY: Primary | ICD-10-CM

## 2025-05-11 PROCEDURE — 3078F DIAST BP <80 MM HG: CPT

## 2025-05-11 PROCEDURE — 3077F SYST BP >= 140 MM HG: CPT

## 2025-05-11 PROCEDURE — 99213 OFFICE O/P EST LOW 20 MIN: CPT

## 2025-05-11 RX ORDER — PREDNISONE 20 MG/1
40 TABLET ORAL DAILY
Qty: 2 TABLET | Refills: 0 | Status: SHIPPED | OUTPATIENT
Start: 2025-05-11 | End: 2025-05-12

## 2025-05-11 RX ORDER — PREDNISONE 20 MG/1
TABLET ORAL
Qty: 20 TABLET | Refills: 0 | Status: CANCELLED | OUTPATIENT
Start: 2025-05-11

## 2025-05-11 RX ORDER — PREDNISONE 20 MG/1
20 TABLET ORAL DAILY
Qty: 4 TABLET | Refills: 0 | Status: SHIPPED | OUTPATIENT
Start: 2025-05-11 | End: 2025-05-15

## 2025-05-11 NOTE — PROGRESS NOTES
ASSESSMENT:  (L23.7) Contact dermatitis due to poison ivy  (primary encounter diagnosis)  Plan: predniSONE (DELTASONE) 20 MG tablet, predniSONE        (DELTASONE) 20 MG tablet    (I10) Hypertension goal BP (blood pressure) < 140/90    PLAN:  Poison ivy-oak and sumac patient instructions discussed and provided.  Given the patient's worsening symptoms over the past 7 days and lack of response to topical treatment; oral prednisone prescribed.  Patient was concerned about developing diabetes while taking the prednisone; therefore 40 mg prescribed for the patient today and 20 mg prescribed for the next 4 days for the patient.  We discussed the gold standard would be prednisone taper starting at 60 mg; however patient declined and preferred the shorter course/lower dose.  We discussed continuing taking Zyrtec and trying oatmeal baths for symptoms.  We also discussed following up with her primary care provider related to her elevated blood pressure.  Informed the patient to return to clinic with any new or worsening symptoms.  Patient acknowledged understanding of the above plan.    The use of Dragon/[employer] dictation services may have been used to construct the content in this note; any grammatical or spelling errors are non-intentional. Please contact the author of this note directly if you are in need of any clarification.      Arun Ascencio, TEREZA CNP    SUBJECTIVE:  Florencia Ross is a 74 year old female who presents with red itchy rash on bilateral wrists, hands, left side of abdomen and upper legs.  Patient notes possible exposure to poison ivy 7 days ago.  Rash is spreading and noting some areas of weeping.  Has a history of similar rash in past due to poison ivy.    Washing clothes in hot water, poison ivy cream, anti-itch spray, Zyrtec and hydrocortisone cream    ROS:  Negative except noted above.    OBJECTIVE:  GENERAL APPEARANCE: healthy, alert and no distress  SKIN: erythematous patches located on the  bilateral anterior wrists extending into her right lower arm, bilateral lower legs, left side of abdomen and bilateral upper legs.     162.56

## 2025-05-11 NOTE — PATIENT INSTRUCTIONS
Continue taking the Zyrtec.  You can also try oatmeal baths for symptoms.  Take prednisone as prescribed.  Follow up with your primary care provider to discuss your elevated blood pressure.

## 2025-07-04 DIAGNOSIS — I10 HYPERTENSION GOAL BP (BLOOD PRESSURE) < 140/90: ICD-10-CM

## 2025-07-05 RX ORDER — LOSARTAN POTASSIUM 50 MG/1
50 TABLET ORAL 2 TIMES DAILY
Qty: 180 TABLET | Refills: 1 | Status: SHIPPED | OUTPATIENT
Start: 2025-07-05

## 2025-07-23 ENCOUNTER — OFFICE VISIT (OUTPATIENT)
Dept: FAMILY MEDICINE | Facility: CLINIC | Age: 75
End: 2025-07-23
Payer: COMMERCIAL

## 2025-07-23 VITALS
SYSTOLIC BLOOD PRESSURE: 136 MMHG | DIASTOLIC BLOOD PRESSURE: 76 MMHG | HEART RATE: 69 BPM | OXYGEN SATURATION: 96 % | BODY MASS INDEX: 22.66 KG/M2 | WEIGHT: 153 LBS | RESPIRATION RATE: 17 BRPM | TEMPERATURE: 98.7 F | HEIGHT: 69 IN

## 2025-07-23 DIAGNOSIS — Z23 NEED FOR VACCINATION: ICD-10-CM

## 2025-07-23 DIAGNOSIS — R21 RASH: Primary | ICD-10-CM

## 2025-07-23 PROCEDURE — 3075F SYST BP GE 130 - 139MM HG: CPT | Performed by: FAMILY MEDICINE

## 2025-07-23 PROCEDURE — 1126F AMNT PAIN NOTED NONE PRSNT: CPT | Performed by: FAMILY MEDICINE

## 2025-07-23 PROCEDURE — 3078F DIAST BP <80 MM HG: CPT | Performed by: FAMILY MEDICINE

## 2025-07-23 PROCEDURE — 99213 OFFICE O/P EST LOW 20 MIN: CPT | Performed by: FAMILY MEDICINE

## 2025-07-23 PROCEDURE — G2211 COMPLEX E/M VISIT ADD ON: HCPCS | Performed by: FAMILY MEDICINE

## 2025-07-23 RX ORDER — TRIAMCINOLONE ACETONIDE 1 MG/G
CREAM TOPICAL 2 TIMES DAILY
Qty: 45 G | Refills: 1 | Status: SHIPPED | OUTPATIENT
Start: 2025-07-23

## 2025-07-23 ASSESSMENT — ASTHMA QUESTIONNAIRES
QUESTION_3 LAST FOUR WEEKS HOW OFTEN DID YOUR ASTHMA SYMPTOMS (WHEEZING, COUGHING, SHORTNESS OF BREATH, CHEST TIGHTNESS OR PAIN) WAKE YOU UP AT NIGHT OR EARLIER THAN USUAL IN THE MORNING: NOT AT ALL
QUESTION_5 LAST FOUR WEEKS HOW WOULD YOU RATE YOUR ASTHMA CONTROL: COMPLETELY CONTROLLED
QUESTION_2 LAST FOUR WEEKS HOW OFTEN HAVE YOU HAD SHORTNESS OF BREATH: NOT AT ALL
QUESTION_4 LAST FOUR WEEKS HOW OFTEN HAVE YOU USED YOUR RESCUE INHALER OR NEBULIZER MEDICATION (SUCH AS ALBUTEROL): NOT AT ALL
ACT_TOTALSCORE: 25
QUESTION_1 LAST FOUR WEEKS HOW MUCH OF THE TIME DID YOUR ASTHMA KEEP YOU FROM GETTING AS MUCH DONE AT WORK, SCHOOL OR AT HOME: NONE OF THE TIME

## 2025-07-23 ASSESSMENT — PAIN SCALES - GENERAL: PAINLEVEL_OUTOF10: NO PAIN (0)

## 2025-07-23 NOTE — PROGRESS NOTES
"  Assessment & Plan     (R21) Rash  (primary encounter diagnosis)  Comment: trial of antifungal and stronger steroid, follow-up with derm if not improving  Plan: terbinafine (LAMISIL) 1 % external cream,         triamcinolone (KENALOG) 0.1 % external cream,         Adult Dermatology  Referral          The longitudinal plan of care for the diagnosis(es)/condition(s) as documented were addressed during this visit. Due to the added complexity in care, I will continue to support Florencia in the subsequent management and with ongoing continuity of care.    Subjective   Florencia is a 74 year old, presenting for the following health issues:  Derm Problem (On back /)      7/23/2025    11:21 AM   Additional Questions   Roomed by alexander   Accompanied by self     History of Present Illness       Reason for visit:  Want dr to look at spot on myback  Symptom onset:  More than a month  Symptoms include:  Wierd itchy dark spot on my back  Symptom intensity:  Mild  Symptom progression:  Staying the same  Had these symptoms before:  No  What makes it worse:  No  What makes it better:  Hydrocortisone cream   She is taking medications regularly.        Pt with red itchy patch on upper right back for over a month  Was seen and put on hydrocortisone cream which did not help  No new exposures and seems to be isolated to just one spot      Review of Systems  Constitutional, HEENT, cardiovascular, pulmonary, gi and gu systems are negative, except as otherwise noted.      Objective    /76   Pulse 69   Temp 98.7  F (37.1  C) (Oral)   Resp 17   Ht 1.753 m (5' 9\")   Wt 69.4 kg (153 lb)   LMP  (LMP Unknown)   SpO2 96%   BMI 22.59 kg/m    Body mass index is 22.59 kg/m .  Physical Exam   GENERAL: alert and no distress  SKIN: no suspicious lesions or rashes and red macular area on upper right back under brastrap. No texture to it, feels like normal skin    No results found for this or any previous visit (from the past 24 hours).    "     Signed Electronically by: Shanel Aguiar MD

## 2025-07-24 ENCOUNTER — PATIENT OUTREACH (OUTPATIENT)
Dept: CARE COORDINATION | Facility: CLINIC | Age: 75
End: 2025-07-24
Payer: COMMERCIAL

## 2025-07-28 ENCOUNTER — PATIENT OUTREACH (OUTPATIENT)
Dept: CARE COORDINATION | Facility: CLINIC | Age: 75
End: 2025-07-28
Payer: COMMERCIAL

## 2025-07-29 ENCOUNTER — MYC MEDICAL ADVICE (OUTPATIENT)
Dept: FAMILY MEDICINE | Facility: CLINIC | Age: 75
End: 2025-07-29
Payer: COMMERCIAL

## (undated) DEVICE — GOWN XLG DISP 9545

## (undated) DEVICE — ESU GROUND PAD ADULT W/CORD E7507

## (undated) DEVICE — Device

## (undated) DEVICE — KIT CONNECTOR FOR OLYMPUS ENDOSCOPES DEFENDO 100310

## (undated) DEVICE — DRSG TELFA 2X3"

## (undated) DEVICE — SOL WATER INJ 2000ML BAG 07118-07

## (undated) DEVICE — GUIDEWIRE SENSOR DUAL FLEX STR 0.035"X150CM M0066703080

## (undated) DEVICE — SOL WATER IRRIG 1000ML BOTTLE 07139-09

## (undated) DEVICE — ENDO TUBING CO2 SMARTCAP STERILE DISP 100145CO2EXT

## (undated) DEVICE — SOL WATER IRRIG 1000ML BOTTLE 2F7114

## (undated) DEVICE — GUIDEWIRE AMPLATZ 0.035"X145CM  SUPER STIFF 640-104

## (undated) DEVICE — SYR 30ML LL W/O NDL

## (undated) DEVICE — PUMP SYSTEM SINGLE ACTION M0067201000

## (undated) DEVICE — GLOVE PROTEXIS W/NEU-THERA 7.5  2D73TE75

## (undated) DEVICE — PREP CHLORAPREP 26ML TINTED ORANGE  260815

## (undated) DEVICE — TAPE CLOTH 3" CARDINAL 3TRCL03

## (undated) DEVICE — BAG URINARY DRAIN 2000ML LF 154002

## (undated) DEVICE — KIT ENDO FIRST STEP DISINFECTANT 200ML W/POUCH EP-4

## (undated) DEVICE — SHEATH URETERAL ACCESS NAVIGATOR 11/13FRX36CM 250-203

## (undated) DEVICE — GLOVE BIOGEL PI MICRO INDICATOR UNDERGLOVE SZ 7.5 48975

## (undated) DEVICE — WIPE PREMOIST CLEANSING WASHCLOTHS 7988

## (undated) DEVICE — PACK ENDOSCOPY GI CUSTOM UMMC

## (undated) DEVICE — SUCTION MANIFOLD DORNOCH ULTRA CART UL-CL500

## (undated) DEVICE — ENDO CAP AND TUBING STERILE FOR ENDOGATOR  100130

## (undated) DEVICE — SPECIMEN CONTAINER 4OZ

## (undated) DEVICE — PAD CHUX UNDERPAD 23X24" 7136

## (undated) DEVICE — GLOVE BIOGEL PI ULTRATOUCH G SZ 7.5 42175

## (undated) RX ORDER — ONDANSETRON 2 MG/ML
INJECTION INTRAMUSCULAR; INTRAVENOUS
Status: DISPENSED
Start: 2019-08-01

## (undated) RX ORDER — FENTANYL CITRATE 50 UG/ML
INJECTION, SOLUTION INTRAMUSCULAR; INTRAVENOUS
Status: DISPENSED
Start: 2019-08-01

## (undated) RX ORDER — FENTANYL CITRATE 50 UG/ML
INJECTION, SOLUTION INTRAMUSCULAR; INTRAVENOUS
Status: DISPENSED
Start: 2019-03-14

## (undated) RX ORDER — FUROSEMIDE 10 MG/ML
INJECTION INTRAMUSCULAR; INTRAVENOUS
Status: DISPENSED
Start: 2019-08-01

## (undated) RX ORDER — DEXAMETHASONE SODIUM PHOSPHATE 4 MG/ML
INJECTION, SOLUTION INTRA-ARTICULAR; INTRALESIONAL; INTRAMUSCULAR; INTRAVENOUS; SOFT TISSUE
Status: DISPENSED
Start: 2019-08-01

## (undated) RX ORDER — FENTANYL CITRATE 50 UG/ML
INJECTION, SOLUTION INTRAMUSCULAR; INTRAVENOUS
Status: DISPENSED
Start: 2024-04-30

## (undated) RX ORDER — KETOROLAC TROMETHAMINE 30 MG/ML
INJECTION, SOLUTION INTRAMUSCULAR; INTRAVENOUS
Status: DISPENSED
Start: 2019-08-01

## (undated) RX ORDER — SIMETHICONE 40MG/0.6ML
SUSPENSION, DROPS(FINAL DOSAGE FORM)(ML) ORAL
Status: DISPENSED
Start: 2019-03-14

## (undated) RX ORDER — PROPOFOL 10 MG/ML
INJECTION, EMULSION INTRAVENOUS
Status: DISPENSED
Start: 2019-08-01

## (undated) RX ORDER — ACETAMINOPHEN 325 MG/1
TABLET ORAL
Status: DISPENSED
Start: 2019-08-01